# Patient Record
Sex: FEMALE | Race: WHITE | NOT HISPANIC OR LATINO | Employment: OTHER | URBAN - METROPOLITAN AREA
[De-identification: names, ages, dates, MRNs, and addresses within clinical notes are randomized per-mention and may not be internally consistent; named-entity substitution may affect disease eponyms.]

---

## 2018-03-30 ENCOUNTER — HOSPITAL ENCOUNTER (OUTPATIENT)
Facility: CLINIC | Age: 68
Discharge: HOME | End: 2018-03-30
Attending: FAMILY MEDICINE
Payer: MEDICARE

## 2018-03-30 VITALS
HEART RATE: 67 BPM | OXYGEN SATURATION: 98 % | DIASTOLIC BLOOD PRESSURE: 66 MMHG | RESPIRATION RATE: 16 BRPM | TEMPERATURE: 98.6 F | SYSTOLIC BLOOD PRESSURE: 110 MMHG

## 2018-03-30 DIAGNOSIS — S61.214A LACERATION OF RIGHT RING FINGER WITHOUT FOREIGN BODY WITHOUT DAMAGE TO NAIL, INITIAL ENCOUNTER: Primary | ICD-10-CM

## 2018-03-30 LAB — GLUCOSE BLOOD, POC: 153

## 2018-03-30 PROCEDURE — 99214 OFFICE O/P EST MOD 30 MIN: CPT | Mod: 25 | Performed by: FAMILY MEDICINE

## 2018-03-30 PROCEDURE — 82962 GLUCOSE BLOOD TEST: CPT | Performed by: FAMILY MEDICINE

## 2018-03-30 PROCEDURE — 93000 ELECTROCARDIOGRAM COMPLETE: CPT | Mod: 59 | Performed by: FAMILY MEDICINE

## 2018-03-30 PROCEDURE — 12001 RPR S/N/AX/GEN/TRNK 2.5CM/<: CPT | Performed by: FAMILY MEDICINE

## 2018-03-30 RX ORDER — FOSINOPRIL SODIUM 40 MG/1
40 TABLET ORAL
COMMUNITY
Start: 2017-05-01 | End: 2018-04-17 | Stop reason: SDUPTHER

## 2018-03-30 RX ORDER — CHOLECALCIFEROL (VITAMIN D3) 25 MCG
TABLET ORAL
COMMUNITY
Start: 2015-04-27

## 2018-03-30 RX ORDER — NAPROXEN SODIUM 220 MG/1
81 TABLET, FILM COATED ORAL
COMMUNITY
Start: 2012-03-01 | End: 2021-10-26

## 2018-03-30 RX ORDER — NADOLOL 40 MG/1
40 TABLET ORAL
COMMUNITY
Start: 2017-05-01 | End: 2018-04-21 | Stop reason: SDUPTHER

## 2018-03-30 RX ORDER — ALPRAZOLAM 0.25 MG/1
0.25 TABLET ORAL
COMMUNITY
Start: 2017-05-01 | End: 2018-07-02 | Stop reason: SDUPTHER

## 2018-03-30 RX ORDER — HYDROCHLOROTHIAZIDE 25 MG/1
25 TABLET ORAL
COMMUNITY
Start: 2017-07-26 | End: 2018-06-27 | Stop reason: SDUPTHER

## 2018-03-30 RX ORDER — SIMVASTATIN 40 MG/1
40 TABLET, FILM COATED ORAL
COMMUNITY
Start: 2017-05-01 | End: 2018-04-17 | Stop reason: SDUPTHER

## 2018-03-30 RX ORDER — METFORMIN HYDROCHLORIDE 1000 MG/1
TABLET ORAL
COMMUNITY
Start: 2017-05-01 | End: 2018-06-27 | Stop reason: SDUPTHER

## 2018-03-30 RX ORDER — CETIRIZINE HYDROCHLORIDE 10 MG/1
10 TABLET ORAL
COMMUNITY
Start: 2012-11-08 | End: 2020-06-19 | Stop reason: ALTCHOICE

## 2018-03-30 RX ORDER — OMEGA-3 FATTY ACIDS 1000 MG
CAPSULE ORAL
COMMUNITY
Start: 2014-03-21 | End: 2022-05-10 | Stop reason: ALTCHOICE

## 2018-03-30 NOTE — ED PROVIDER NOTES
History  No chief complaint on file.    68 yo f, presents c/o right 4th finger laceration.  Pt states she broke a glass.  Pt said she then tried to crush glass, and then push it down in the garbage.  The edge cut her finger.  Pt denies pain/  Pt has full rom.  No Parathesias.  No other injuries.   NKDa.  No cp, sob.      During the procedure, pt started c/o nausea, then passed out.  Pt was moved back to lay on table.  Pt woke up immediatly.  Had continued complaints of nausea. Pt denies cp, sob, palp, weakness, h/a, vision changes.  After evaluating patient, she was discharged.  Pt denies cp, sob, palp, dizziness, vision changes, paraesthesias. Pt denies any other complaints on 14 pt ros.             Past Medical History:   Diagnosis Date   • Hypertension        No past surgical history on file.    No family history on file.    Social History   Substance Use Topics   • Smoking status: Never Smoker   • Smokeless tobacco: Never Used   • Alcohol use Not on file       Review of Systems    Physical Exam  ED Triage Vitals [03/30/18 1509]   Temp Heart Rate Resp BP SpO2   37 °C (98.6 °F) 67 16 110/66 98 %      Temp src Heart Rate Source Patient Position BP Location FiO2 (%) (Set)   -- -- -- -- --       Physical Exam   Constitutional: She is oriented to person, place, and time. She appears well-developed and well-nourished. No distress.   HENT:   Head: Normocephalic.   Nose: Nose normal.   Mouth/Throat: Oropharynx is clear and moist.   Eyes: Conjunctivae and EOM are normal. Pupils are equal, round, and reactive to light.   Neck: Normal range of motion. Neck supple. No JVD present. No tracheal deviation present. No thyromegaly present.   Cardiovascular: Normal rate, regular rhythm, normal heart sounds and intact distal pulses.  Exam reveals no gallop and no friction rub.    No murmur heard.  Pulmonary/Chest: Effort normal and breath sounds normal.   Abdominal: Soft. Bowel sounds are normal.   Musculoskeletal: She exhibits no  edema, tenderness or deformity.   Right hand, 4th finger volar, small 1cm x 2cm x 3cm   Lymphadenopathy:     She has no cervical adenopathy.   Neurological: She is alert and oriented to person, place, and time. No cranial nerve deficit or sensory deficit. Coordination normal.   Patient was [id number] during exam and beginning of procedure.  Pt watched suturing, then passed out.  Pt woke quickly, and was aaox3, immediatly after.  Prior to d/c, aaox3, nad, nonfocal, no signs of abnormal balance.   Skin: Skin is warm. Capillary refill takes less than 2 seconds. She is not diaphoretic.   Psychiatric: She has a normal mood and affect. Her behavior is normal. Thought content normal.         Procedures  Laceration Repair  Date/Time: 3/30/2018 3:51 PM  Performed by: ANUSHA ADAMS  Authorized by: ANUSHA ADAMS     Consent:     Consent obtained:  Verbal    Consent given by:  Patient    Risks discussed:  Infection, need for additional repair, nerve damage, poor wound healing, poor cosmetic result, pain, retained foreign body, tendon damage and vascular damage    Alternatives discussed:  No treatment, observation and delayed treatment  Anesthesia (see MAR for exact dosages):     Anesthesia method:  Local infiltration    Local anesthetic:  Lidocaine 2% w/o epi  Laceration details:     Location:  Finger    Finger location:  R ring finger    Length (cm):  1    Depth (mm):  0.3  Repair type:     Repair type:  Simple  Pre-procedure details:     Preparation:  Patient was prepped and draped in usual sterile fashion  Exploration:     Hemostasis achieved with:  Direct pressure    Wound exploration: wound explored through full range of motion and entire depth of wound probed and visualized      Wound extent: no areolar tissue violation noted, no fascia violation noted, no foreign bodies/material noted, no muscle damage noted, no nerve damage noted, no tendon damage noted, no underlying fracture noted and no vascular damage noted       Contaminated: no    Treatment:     Area cleansed with:  Saline    Amount of cleaning:  Standard    Irrigation solution:  Tap water and sterile saline    Irrigation volume:  500    Irrigation method:  Tap and syringe    Visualized foreign bodies/material removed: no    Skin repair:     Repair method:  Sutures    Suture size:  5-0    Suture material:  Nylon    Suture technique:  Simple interrupted  Approximation:     Approximation:  Close  Post-procedure details:     Dressing:  Antibiotic ointment and sterile dressing    Patient tolerance of procedure:  Tolerated with difficulty  Comments:      Pt had a syncopal episode during procedure. She was sitting up watching, then, c/o nausea, then lost consc.  Pt fell forward onto me.  I called for assistance, layed patient back onto table, elevated feet. Head flat. Pt awoke immediatly.  She was alert to consent for continued repair.  3 sutures placed.  Wound re-irrigated after procedure.  Pt alert and awake after procedure.  She will be evaluate dfo revent        UC Course  ED Course          Clinical Impressions as of Mar 30 1600   (Final) Laceration of right ring finger without foreign body without damage to nail, initial encounter   (Under Consideration) Syncope and collapse       FARHAD Munroe,   03/30/18 7915

## 2018-03-30 NOTE — DISCHARGE INSTRUCTIONS
Laceration:  Please return in 14 days for suture removal.    Please keep hand dry for 24 hours.  After 24 hours you may wash your hands, pat dry and re-bandage.  Leave the Steri strips in place until they fall off.    Please do not submerge hand, bathe, or swim.  You may shower.  Please monitor area for more pain, swelling, redness pus, fever.  If any occur, please follow up asap.     Syncope:  You passed out during the procedure.  I was able to finish the procedure once you were safe.  I did an EKG which showed no acute changes.  Your blood sugar was in the 150s.  If symptoms return, please go to the ER.  If severe, please call 911.

## 2018-04-03 ENCOUNTER — OFFICE VISIT (OUTPATIENT)
Dept: FAMILY MEDICINE | Facility: CLINIC | Age: 68
End: 2018-04-03
Payer: MEDICARE

## 2018-04-03 VITALS
WEIGHT: 152.4 LBS | TEMPERATURE: 98.6 F | SYSTOLIC BLOOD PRESSURE: 142 MMHG | DIASTOLIC BLOOD PRESSURE: 82 MMHG | HEART RATE: 64 BPM | BODY MASS INDEX: 26.02 KG/M2 | RESPIRATION RATE: 16 BRPM | HEIGHT: 64 IN

## 2018-04-03 DIAGNOSIS — J32.9 SINUSITIS, UNSPECIFIED CHRONICITY, UNSPECIFIED LOCATION: Primary | ICD-10-CM

## 2018-04-03 PROCEDURE — 99213 OFFICE O/P EST LOW 20 MIN: CPT | Performed by: FAMILY MEDICINE

## 2018-04-03 RX ORDER — AMOXICILLIN AND CLAVULANATE POTASSIUM 875; 125 MG/1; MG/1
1 TABLET, FILM COATED ORAL 2 TIMES DAILY
Qty: 20 TABLET | Refills: 0 | Status: SHIPPED | OUTPATIENT
Start: 2018-04-03 | End: 2020-01-31 | Stop reason: ALTCHOICE

## 2018-04-03 RX ORDER — BENZONATATE 100 MG/1
100 CAPSULE ORAL 3 TIMES DAILY PRN
Qty: 21 CAPSULE | Refills: 0 | Status: SHIPPED | OUTPATIENT
Start: 2018-04-03 | End: 2020-01-31 | Stop reason: ALTCHOICE

## 2018-04-03 ASSESSMENT — ENCOUNTER SYMPTOMS
COUGH: 1
SINUS PAIN: 1

## 2018-04-03 NOTE — PROGRESS NOTES
"Subjective      Patient ID: Vianney Bone is a 67 y.o. female.    URI    This is a new problem. The current episode started in the past 7 days. The problem has been unchanged. The maximum temperature recorded prior to her arrival was 100.4 - 100.9 F. Associated symptoms include congestion, coughing, headaches and sinus pain. Pertinent negatives include no wheezing. Treatments tried: mucinex. The treatment provided no relief.       The following have been reviewed and updated as appropriate in this visit:       Review of Systems   Constitutional: Positive for fever.   HENT: Positive for congestion, sinus pain and sinus pressure.    Respiratory: Positive for cough. Negative for wheezing.    Neurological: Positive for headaches. Negative for dizziness and light-headedness.       Objective     Vitals:    04/03/18 1513   BP: (!) 142/82   Pulse: 64   Resp: 16   Temp: 37 °C (98.6 °F)   Weight: 69.1 kg (152 lb 6.4 oz)   Height: 1.626 m (5' 4\")     Body mass index is 26.16 kg/m².    Physical Exam   Constitutional: She appears well-developed and well-nourished.   HENT:   Right Ear: Tympanic membrane and ear canal normal.   Left Ear: Tympanic membrane and ear canal normal.   Nose: Right sinus exhibits maxillary sinus tenderness. Left sinus exhibits maxillary sinus tenderness.   Mouth/Throat: Uvula is midline, oropharynx is clear and moist and mucous membranes are normal.   Cardiovascular: Normal rate and normal heart sounds.    Pulmonary/Chest: Effort normal and breath sounds normal. No respiratory distress. She has no wheezes.       Assessment/Plan   Problem List Items Addressed This Visit     Sinusitis - Primary     Prescribed Augmentin 875 mg twice a day for 10 days with food  Prescribed Tessalon Perles 100 mg 3 times a day as needed for cough  Call if symptoms persist or worsen               "

## 2018-04-04 PROBLEM — J32.9 SINUSITIS: Status: ACTIVE | Noted: 2018-04-04

## 2018-04-04 ASSESSMENT — ENCOUNTER SYMPTOMS
HEADACHES: 1
LIGHT-HEADEDNESS: 0
DIZZINESS: 0
FEVER: 1
SINUS PRESSURE: 1
WHEEZING: 0

## 2018-04-04 NOTE — ASSESSMENT & PLAN NOTE
Prescribed Augmentin 875 mg twice a day for 10 days with food  Prescribed Tessalon Perles 100 mg 3 times a day as needed for cough  Call if symptoms persist or worsen

## 2018-04-13 ENCOUNTER — HOSPITAL ENCOUNTER (OUTPATIENT)
Facility: CLINIC | Age: 68
Discharge: HOME | End: 2018-04-13
Attending: EMERGENCY MEDICINE
Payer: MEDICARE

## 2018-04-13 VITALS
SYSTOLIC BLOOD PRESSURE: 130 MMHG | WEIGHT: 150 LBS | HEIGHT: 64 IN | BODY MASS INDEX: 25.61 KG/M2 | RESPIRATION RATE: 16 BRPM | TEMPERATURE: 97.7 F | DIASTOLIC BLOOD PRESSURE: 88 MMHG | HEART RATE: 55 BPM | OXYGEN SATURATION: 96 %

## 2018-04-13 DIAGNOSIS — Z48.02 VISIT FOR SUTURE REMOVAL: Primary | ICD-10-CM

## 2018-04-13 PROCEDURE — 99213 OFFICE O/P EST LOW 20 MIN: CPT | Performed by: EMERGENCY MEDICINE

## 2018-04-13 ASSESSMENT — ENCOUNTER SYMPTOMS
COUGH: 0
SORE THROAT: 0
HEMATURIA: 0
DYSURIA: 0
CHILLS: 0
VOMITING: 0
ABDOMINAL PAIN: 0
SEIZURES: 0
WOUND: 1
PALPITATIONS: 0
FEVER: 0
COLOR CHANGE: 0
EYE PAIN: 0
ARTHRALGIAS: 0
SHORTNESS OF BREATH: 0
BACK PAIN: 0

## 2018-04-13 NOTE — ED PROVIDER NOTES
History  Chief Complaint   Patient presents with   • Suture / Staple Removal     right 3rd finger      70 yo female with a history of HTN returns to the urgent care for suture removal. The patient had 3 sutures placed in her right ring finger on 3/30/18. The wound is healing well. No drainage or bleeding. No surrounding redness/warmth. Tetanus UTD. No other complaints.            Past Medical History:   Diagnosis Date   • Hypertension        History reviewed. No pertinent surgical history.    History reviewed. No pertinent family history.    Social History   Substance Use Topics   • Smoking status: Never Smoker   • Smokeless tobacco: Never Used   • Alcohol use Not on file       Review of Systems   Constitutional: Negative for chills and fever.   HENT: Negative for ear pain and sore throat.    Eyes: Negative for pain and visual disturbance.   Respiratory: Negative for cough and shortness of breath.    Cardiovascular: Negative for chest pain and palpitations.   Gastrointestinal: Negative for abdominal pain and vomiting.   Genitourinary: Negative for dysuria and hematuria.   Musculoskeletal: Negative for arthralgias and back pain.   Skin: Positive for wound. Negative for color change and rash.   Neurological: Negative for seizures and syncope.   All other systems reviewed and are negative.      Physical Exam  ED Triage Vitals [04/13/18 1127]   Temp Heart Rate Resp BP SpO2   36.5 °C (97.7 °F) (!) 55 16 130/88 96 %      Temp Source Heart Rate Source Patient Position BP Location FiO2 (%) (Set)   Oral Monitor Sitting Left upper arm --       Physical Exam   Constitutional: She appears well-developed and well-nourished. No distress.   HENT:   Head: Normocephalic and atraumatic.   Eyes: Conjunctivae are normal.   Neck: Neck supple.   Cardiovascular: Normal rate and regular rhythm.    No murmur heard.  Pulmonary/Chest: Effort normal and breath sounds normal. No respiratory distress.   Abdominal: Soft. There is no tenderness.    Musculoskeletal: She exhibits no edema.        Right hand: She exhibits laceration. She exhibits normal range of motion, no tenderness, normal capillary refill and no swelling. Normal sensation noted. Normal strength noted.   Right volar ring finger laceration C/D/I. No surrounding erythema/warmth. Sutures x 3 in place.   Neurological: She is alert.   Skin: Skin is warm and dry.   Psychiatric: She has a normal mood and affect.   Nursing note and vitals reviewed.        Procedures  Suture Removal  Date/Time: 4/13/2018 11:47 AM  Performed by: JUSTIN MCKEON  Authorized by: JUSTIN MCKEON     Consent:     Consent obtained:  Verbal    Consent given by:  Patient    Risks discussed:  Bleeding, pain and wound separation  Location:     Location:  Upper extremity    Upper extremity location:  Hand    Hand location:  R ring finger  Procedure details:     Wound appearance:  No signs of infection    Number of sutures removed:  3  Post-procedure details:     Post-removal:  No dressing applied    Patient tolerance of procedure:  Tolerated well, no immediate complications        UC Course  ED Course          Clinical Impressions as of Apr 13 1139   Visit for suture removal       MDM  Number of Diagnoses or Management Options  Visit for suture removal:   Diagnosis management comments: Sutures removed without difficulty. The wound is healing well, no signs of infection. Plan for local wound care and PCP follow up as needed. The patient is agreeable to this plan. Strict return precautions provided.    Patient Progress  Patient progress: stable                 Justin Mckeon MD  04/13/18 9003

## 2018-04-17 RX ORDER — FOSINOPRIL SODIUM 40 MG/1
40 TABLET ORAL DAILY
Qty: 90 TABLET | Refills: 1 | Status: SHIPPED | OUTPATIENT
Start: 2018-04-17 | End: 2018-06-27 | Stop reason: SDUPTHER

## 2018-04-17 RX ORDER — SIMVASTATIN 40 MG/1
40 TABLET, FILM COATED ORAL NIGHTLY
Qty: 90 TABLET | Refills: 1 | Status: SHIPPED | OUTPATIENT
Start: 2018-04-17 | End: 2018-06-27 | Stop reason: SDUPTHER

## 2018-04-23 RX ORDER — NADOLOL 40 MG/1
40 TABLET ORAL DAILY
Qty: 90 TABLET | Refills: 3 | Status: SHIPPED | OUTPATIENT
Start: 2018-04-23 | End: 2018-06-27 | Stop reason: SDUPTHER

## 2018-05-15 ENCOUNTER — TRANSCRIBE ORDERS (OUTPATIENT)
Dept: SCHEDULING | Age: 68
End: 2018-05-15

## 2018-05-15 ENCOUNTER — APPOINTMENT (OUTPATIENT)
Dept: URBAN - METROPOLITAN AREA CLINIC 200 | Age: 68
Setting detail: DERMATOLOGY
End: 2018-05-18

## 2018-05-15 DIAGNOSIS — D485 NEOPLASM OF UNCERTAIN BEHAVIOR OF SKIN: ICD-10-CM

## 2018-05-15 DIAGNOSIS — L20.84 INTRINSIC (ALLERGIC) ECZEMA: ICD-10-CM

## 2018-05-15 DIAGNOSIS — Z12.39 SCREENING BREAST EXAMINATION: Primary | ICD-10-CM

## 2018-05-15 DIAGNOSIS — L57.8 OTHER SKIN CHANGES DUE TO CHRONIC EXPOSURE TO NONIONIZING RADIATION: ICD-10-CM

## 2018-05-15 PROBLEM — D48.5 NEOPLASM OF UNCERTAIN BEHAVIOR OF SKIN: Status: ACTIVE | Noted: 2018-05-15

## 2018-05-15 PROCEDURE — OTHER PRESCRIPTION: OTHER

## 2018-05-15 PROCEDURE — 99213 OFFICE O/P EST LOW 20 MIN: CPT | Mod: 25

## 2018-05-15 PROCEDURE — OTHER COUNSELING: OTHER

## 2018-05-15 PROCEDURE — 11100: CPT

## 2018-05-15 PROCEDURE — OTHER BIOPSY BY SHAVE METHOD: OTHER

## 2018-05-15 RX ORDER — ALCLOMETASONE DIPROPIONATE 0.5 MG/G
CREAM TOPICAL
Qty: 1 | Refills: 5 | Status: ERX

## 2018-05-15 ASSESSMENT — LOCATION DETAILED DESCRIPTION DERM
LOCATION DETAILED: LEFT MEDIAL FOREHEAD
LOCATION DETAILED: NASAL ROOT
LOCATION DETAILED: LEFT MEDIAL SUPERIOR CHEST

## 2018-05-15 ASSESSMENT — LOCATION SIMPLE DESCRIPTION DERM
LOCATION SIMPLE: NOSE
LOCATION SIMPLE: LEFT FOREHEAD
LOCATION SIMPLE: CHEST

## 2018-05-15 ASSESSMENT — LOCATION ZONE DERM
LOCATION ZONE: NOSE
LOCATION ZONE: TRUNK
LOCATION ZONE: FACE

## 2018-05-15 ASSESSMENT — SEVERITY ASSESSMENT: SEVERITY: MILD

## 2018-05-15 NOTE — PROCEDURE: BIOPSY BY SHAVE METHOD
Was A Bandage Applied: Yes
Anesthesia Type: 1% lidocaine with epinephrine
Type Of Destruction Used: Curettage
X Size Of Lesion In Cm: 0
Destruction After The Procedure: No
Biopsy Type: H and E
Billing Type: Third-Party Bill
Dressing: bandage
Wound Care: Vaseline
Post-Care Instructions: I reviewed with the patient in detail post-care instructions. Patient is to keep the biopsy site dry overnight, and then apply bacitracin twice daily until healed. Patient may apply hydrogen peroxide soaks to remove any crusting.
Notification Instructions: Patient will be notified of biopsy results. However, patient instructed to call the office if not contacted within 2 weeks.
consent was obtained and risks were reviewed including but not limited to scarring, infection, bleeding, scabbing, incomplete removal, nerve damage and allergy to anesthesia.
Biopsy Method: Personna blade
Size Of Lesion In Cm: 0.2
Anesthesia Volume In Cc (Will Not Render If 0): 0.1
Hemostasis: Drysol
Detail Level: Detailed

## 2018-06-14 ENCOUNTER — HOSPITAL ENCOUNTER (OUTPATIENT)
Dept: RADIOLOGY | Facility: HOSPITAL | Age: 68
Discharge: HOME | End: 2018-06-14
Attending: OBSTETRICS & GYNECOLOGY
Payer: MEDICARE

## 2018-06-14 DIAGNOSIS — Z12.39 SCREENING BREAST EXAMINATION: ICD-10-CM

## 2018-06-14 PROCEDURE — 77063 BREAST TOMOSYNTHESIS BI: CPT

## 2018-06-21 ENCOUNTER — CLINICAL SUPPORT (OUTPATIENT)
Dept: FAMILY MEDICINE | Facility: CLINIC | Age: 68
End: 2018-06-21
Payer: MEDICARE

## 2018-06-21 DIAGNOSIS — E11.9 CONTROLLED TYPE 2 DIABETES MELLITUS WITHOUT COMPLICATION, WITHOUT LONG-TERM CURRENT USE OF INSULIN (CMS/HCC): Primary | ICD-10-CM

## 2018-06-21 DIAGNOSIS — E78.5 HYPERLIPIDEMIA, UNSPECIFIED HYPERLIPIDEMIA TYPE: ICD-10-CM

## 2018-06-21 PROCEDURE — 36415 COLL VENOUS BLD VENIPUNCTURE: CPT | Performed by: FAMILY MEDICINE

## 2018-06-22 LAB
ALBUMIN SERPL-MCNC: 4.4 G/DL (ref 3.6–4.8)
ALBUMIN/CREAT UR: 14.3 MG/G CREAT (ref 0–30)
ALBUMIN/GLOB SERPL: 1.6 {RATIO} (ref 1.2–2.2)
ALP SERPL-CCNC: 37 IU/L (ref 39–117)
ALT SERPL-CCNC: 24 IU/L (ref 0–32)
AST SERPL-CCNC: 23 IU/L (ref 0–40)
BASOPHILS # BLD AUTO: 0 X10E3/UL (ref 0–0.2)
BASOPHILS NFR BLD AUTO: 0 %
BILIRUB SERPL-MCNC: 0.7 MG/DL (ref 0–1.2)
BUN SERPL-MCNC: 16 MG/DL (ref 8–27)
BUN/CREAT SERPL: 12 (ref 12–28)
CALCIUM SERPL-MCNC: 9.9 MG/DL (ref 8.7–10.3)
CHLORIDE SERPL-SCNC: 96 MMOL/L (ref 96–106)
CHOLEST SERPL-MCNC: 157 MG/DL (ref 100–199)
CO2 SERPL-SCNC: 27 MMOL/L (ref 20–29)
CREAT SERPL-MCNC: 1.29 MG/DL (ref 0.57–1)
CREAT UR-MCNC: 61.6 MG/DL
EOSINOPHIL # BLD AUTO: 0.6 X10E3/UL (ref 0–0.4)
EOSINOPHIL NFR BLD AUTO: 8 %
ERYTHROCYTE [DISTWIDTH] IN BLOOD BY AUTOMATED COUNT: 13.9 % (ref 12.3–15.4)
GFR SERPLBLD CREATININE-BSD FMLA CKD-EPI: 43 ML/MIN/1.73
GFR SERPLBLD CREATININE-BSD FMLA CKD-EPI: 50 ML/MIN/1.73
GLOBULIN SER CALC-MCNC: 2.7 G/DL (ref 1.5–4.5)
GLUCOSE SERPL-MCNC: 135 MG/DL (ref 65–99)
HBA1C MFR BLD: 6.8 % (ref 4.8–5.6)
HCT VFR BLD AUTO: 37.1 % (ref 34–46.6)
HDLC SERPL-MCNC: 44 MG/DL
HGB BLD-MCNC: 12.7 G/DL (ref 11.1–15.9)
IMM GRANULOCYTES # BLD: 0 X10E3/UL (ref 0–0.1)
IMM GRANULOCYTES NFR BLD: 0 %
LDLC SERPL CALC-MCNC: 67 MG/DL (ref 0–99)
LYMPHOCYTES # BLD AUTO: 1.5 X10E3/UL (ref 0.7–3.1)
LYMPHOCYTES NFR BLD AUTO: 21 %
MCH RBC QN AUTO: 30.2 PG (ref 26.6–33)
MCHC RBC AUTO-ENTMCNC: 34.2 G/DL (ref 31.5–35.7)
MCV RBC AUTO: 88 FL (ref 79–97)
MICROALBUMIN UR-MCNC: 8.8 UG/ML
MONOCYTES # BLD AUTO: 0.5 X10E3/UL (ref 0.1–0.9)
MONOCYTES NFR BLD AUTO: 7 %
NEUTROPHILS # BLD AUTO: 4.7 X10E3/UL (ref 1.4–7)
NEUTROPHILS NFR BLD AUTO: 64 %
PLATELET # BLD AUTO: 301 X10E3/UL (ref 150–379)
POTASSIUM SERPL-SCNC: 4.4 MMOL/L (ref 3.5–5.2)
PROT SERPL-MCNC: 7.1 G/DL (ref 6–8.5)
RBC # BLD AUTO: 4.2 X10E6/UL (ref 3.77–5.28)
SODIUM SERPL-SCNC: 140 MMOL/L (ref 134–144)
TRIGL SERPL-MCNC: 228 MG/DL (ref 0–149)
VLDLC SERPL CALC-MCNC: 46 MG/DL (ref 5–40)
WBC # BLD AUTO: 7.3 X10E3/UL (ref 3.4–10.8)

## 2018-06-27 ENCOUNTER — OFFICE VISIT (OUTPATIENT)
Dept: FAMILY MEDICINE | Facility: CLINIC | Age: 68
End: 2018-06-27
Payer: MEDICARE

## 2018-06-27 VITALS
HEART RATE: 68 BPM | BODY MASS INDEX: 26.36 KG/M2 | WEIGHT: 154.4 LBS | SYSTOLIC BLOOD PRESSURE: 140 MMHG | RESPIRATION RATE: 16 BRPM | HEIGHT: 64 IN | DIASTOLIC BLOOD PRESSURE: 86 MMHG | TEMPERATURE: 97.5 F

## 2018-06-27 DIAGNOSIS — Z23 IMMUNIZATION DUE: ICD-10-CM

## 2018-06-27 DIAGNOSIS — Z00.00 MEDICARE ANNUAL WELLNESS VISIT, SUBSEQUENT: Primary | ICD-10-CM

## 2018-06-27 DIAGNOSIS — E78.2 MIXED HYPERLIPIDEMIA: ICD-10-CM

## 2018-06-27 DIAGNOSIS — E11.9 TYPE 2 DIABETES MELLITUS WITHOUT COMPLICATION, WITHOUT LONG-TERM CURRENT USE OF INSULIN (CMS/HCC): ICD-10-CM

## 2018-06-27 DIAGNOSIS — I10 BENIGN ESSENTIAL HYPERTENSION: ICD-10-CM

## 2018-06-27 PROBLEM — C44.321 SQUAMOUS CELL CANCER OF SKIN OF NOSE: Status: ACTIVE | Noted: 2018-06-27

## 2018-06-27 PROCEDURE — 90732 PPSV23 VACC 2 YRS+ SUBQ/IM: CPT | Performed by: FAMILY MEDICINE

## 2018-06-27 PROCEDURE — G0009 ADMIN PNEUMOCOCCAL VACCINE: HCPCS | Performed by: FAMILY MEDICINE

## 2018-06-27 PROCEDURE — G0439 PPPS, SUBSEQ VISIT: HCPCS | Performed by: FAMILY MEDICINE

## 2018-06-27 RX ORDER — SIMVASTATIN 40 MG/1
40 TABLET, FILM COATED ORAL NIGHTLY
Qty: 90 TABLET | Refills: 3 | Status: SHIPPED | OUTPATIENT
Start: 2018-06-27 | End: 2019-06-19 | Stop reason: SDUPTHER

## 2018-06-27 RX ORDER — HYDROCHLOROTHIAZIDE 25 MG/1
25 TABLET ORAL DAILY
Qty: 90 TABLET | Refills: 3 | Status: SHIPPED | OUTPATIENT
Start: 2018-06-27 | End: 2019-08-18 | Stop reason: SDUPTHER

## 2018-06-27 RX ORDER — METFORMIN HYDROCHLORIDE 1000 MG/1
1000 TABLET ORAL 2 TIMES DAILY WITH MEALS
Qty: 180 TABLET | Refills: 3 | Status: SHIPPED | OUTPATIENT
Start: 2018-06-27 | End: 2019-05-07 | Stop reason: SDUPTHER

## 2018-06-27 RX ORDER — NADOLOL 40 MG/1
40 TABLET ORAL DAILY
Qty: 90 TABLET | Refills: 3 | Status: SHIPPED | OUTPATIENT
Start: 2018-06-27 | End: 2019-07-13 | Stop reason: SDUPTHER

## 2018-06-27 RX ORDER — FOSINOPRIL SODIUM 40 MG/1
40 TABLET ORAL DAILY
Qty: 90 TABLET | Refills: 3 | Status: SHIPPED | OUTPATIENT
Start: 2018-06-27 | End: 2019-06-19 | Stop reason: SDUPTHER

## 2018-06-27 ASSESSMENT — ENCOUNTER SYMPTOMS
BRUISES/BLEEDS EASILY: 0
HEADACHES: 0
FREQUENCY: 0
BACK PAIN: 0
COUGH: 0
DIFFICULTY URINATING: 0
VOMITING: 0
NAUSEA: 0
NECK PAIN: 0
CHILLS: 0
SHORTNESS OF BREATH: 0
SLEEP DISTURBANCE: 1
CONSTIPATION: 0
DIZZINESS: 0
POLYPHAGIA: 0
NERVOUS/ANXIOUS: 0
PALPITATIONS: 0
POLYDIPSIA: 0
SINUS PAIN: 0
DIARRHEA: 0
ABDOMINAL PAIN: 0
FEVER: 0
ADENOPATHY: 0
SINUS PRESSURE: 0

## 2018-06-27 NOTE — PROGRESS NOTES
"Subjective      Patient ID: Vianney Bone is a 67 y.o. female.    Patient presents for annual wellness visit.  Does get occasional anxiety, increased stress lately. No chest pain or SOB. Staying active.        The following have been reviewed and updated as appropriate in this visit:  Tobacco  Allergies  Meds  Problems  Med Hx  Surg Hx  Fam Hx  Soc Hx        Review of Systems   Constitutional: Negative for chills and fever.   HENT: Negative for dental problem, sinus pain and sinus pressure.    Eyes: Negative for visual disturbance.   Respiratory: Negative for cough and shortness of breath.    Cardiovascular: Negative for chest pain, palpitations and leg swelling.   Gastrointestinal: Negative for abdominal pain, constipation, diarrhea, nausea and vomiting.   Endocrine: Negative for cold intolerance, heat intolerance, polydipsia, polyphagia and polyuria.   Genitourinary: Negative for difficulty urinating, frequency and urgency.   Musculoskeletal: Negative for back pain and neck pain.   Allergic/Immunologic: Negative for environmental allergies and food allergies.   Neurological: Negative for dizziness and headaches.   Hematological: Negative for adenopathy. Does not bruise/bleed easily.   Psychiatric/Behavioral: Positive for sleep disturbance. Negative for behavioral problems. The patient is not nervous/anxious.        Objective     Vitals:    06/27/18 1550 06/27/18 1600   BP: (!) 158/90 140/86   BP Location:  Left upper arm   Patient Position:  Sitting   Pulse: 68    Resp: 16    Temp: 36.4 °C (97.5 °F)    Weight: 70 kg (154 lb 6.4 oz)    Height: 1.626 m (5' 4\")      Body mass index is 26.5 kg/m².    Physical Exam   Constitutional: She is oriented to person, place, and time. She appears well-developed and well-nourished.   HENT:   Head: Normocephalic and atraumatic.   Nose: Nose normal.   Mouth/Throat: Oropharynx is clear and moist.   Eyes: Conjunctivae and EOM are normal. Pupils are equal, round, and reactive " to light.   Neck: Neck supple. No thyromegaly present.   Cardiovascular: Normal rate, regular rhythm and normal heart sounds.    No murmur heard.  Pulmonary/Chest: Effort normal and breath sounds normal. She has no wheezes.   Abdominal: Soft. Bowel sounds are normal. There is no tenderness.   Musculoskeletal: Normal range of motion.   Lymphadenopathy:     She has no cervical adenopathy.   Neurological: She is alert and oriented to person, place, and time.   3/3 word recall  2/2 clock test   Skin: Skin is warm and dry. No rash noted.   Psychiatric: She has a normal mood and affect. Her behavior is normal.       Assessment/Plan   Problem List Items Addressed This Visit     Benign essential hypertension     Fair control  Continue current meds  Low sat diet  Follow up in 6 weeks         Relevant Medications    fosinopril (MONOPRIL) 40 mg tablet    hydrochlorothiazide (HYDRODIURIL) 25 mg tablet    nadolol (CORGARD) 40 mg tablet    Diabetes mellitus (CMS/HCC) (HCC)     Stable  HgbA1c 6.8  Continue current medication         Relevant Medications    metFORMIN (GLUCOPHAGE) 1,000 mg tablet    Mixed hyperlipidemia     Cholesterol and LDL at goal  Trigs too high and HDL too low  Watch carbs and sugars in diet  Continue simvastatin         Relevant Medications    simvastatin (ZOCOR) 40 mg tablet    Medicare annual wellness visit, subsequent - Primary     68 yo female presents for annual wellness visit  Forms completed, reviewed and scanned         Immunization due     pneumovax         Relevant Orders    Pneumococcal polysaccharide vaccine 23-valent greater than or equal to 1yo subcutaneous/IM (Completed)

## 2018-07-02 RX ORDER — ALPRAZOLAM 0.25 MG/1
0.25 TABLET ORAL NIGHTLY PRN
Qty: 30 TABLET | Refills: 0 | Status: SHIPPED | OUTPATIENT
Start: 2018-07-02 | End: 2020-01-31 | Stop reason: SDUPTHER

## 2018-07-03 PROBLEM — Z23 IMMUNIZATION DUE: Status: ACTIVE | Noted: 2018-07-03

## 2018-07-03 PROBLEM — Z00.00 MEDICARE ANNUAL WELLNESS VISIT, SUBSEQUENT: Status: ACTIVE | Noted: 2018-07-03

## 2018-07-03 RX ORDER — ALPRAZOLAM 0.25 MG/1
0.25 TABLET ORAL NIGHTLY PRN
Qty: 20 TABLET | Refills: 0 | Status: SHIPPED | OUTPATIENT
Start: 2018-07-03 | End: 2019-01-15 | Stop reason: SDUPTHER

## 2018-07-03 ASSESSMENT — ACTIVITIES OF DAILY LIVING (ADL)
ADEQUATE_TO_COMPLETE_ADL: YES
PATIENT'S MEMORY ADEQUATE TO SAFELY COMPLETE DAILY ACTIVITIES?: YES

## 2018-07-03 NOTE — ASSESSMENT & PLAN NOTE
Cholesterol and LDL at goal  Trigs too high and HDL too low  Watch carbs and sugars in diet  Continue simvastatin

## 2018-10-25 ENCOUNTER — OFFICE VISIT (OUTPATIENT)
Dept: FAMILY MEDICINE | Facility: CLINIC | Age: 68
End: 2018-10-25
Payer: MEDICARE

## 2018-10-25 VITALS
HEART RATE: 68 BPM | DIASTOLIC BLOOD PRESSURE: 86 MMHG | TEMPERATURE: 97.7 F | HEIGHT: 64 IN | WEIGHT: 153.8 LBS | BODY MASS INDEX: 26.26 KG/M2 | SYSTOLIC BLOOD PRESSURE: 122 MMHG | RESPIRATION RATE: 14 BRPM

## 2018-10-25 DIAGNOSIS — E78.2 MIXED HYPERLIPIDEMIA: ICD-10-CM

## 2018-10-25 DIAGNOSIS — E11.9 TYPE 2 DIABETES MELLITUS WITHOUT COMPLICATION, WITHOUT LONG-TERM CURRENT USE OF INSULIN (CMS/HCC): Primary | ICD-10-CM

## 2018-10-25 DIAGNOSIS — I10 BENIGN ESSENTIAL HYPERTENSION: ICD-10-CM

## 2018-10-25 PROBLEM — J32.9 SINUSITIS: Status: RESOLVED | Noted: 2018-04-04 | Resolved: 2018-10-25

## 2018-10-25 PROCEDURE — 36415 COLL VENOUS BLD VENIPUNCTURE: CPT | Performed by: FAMILY MEDICINE

## 2018-10-25 PROCEDURE — 99213 OFFICE O/P EST LOW 20 MIN: CPT | Mod: 25 | Performed by: FAMILY MEDICINE

## 2018-10-25 ASSESSMENT — ENCOUNTER SYMPTOMS
COUGH: 0
PALPITATIONS: 0
ABDOMINAL PAIN: 0
BRUISES/BLEEDS EASILY: 0
HEADACHES: 0
SHORTNESS OF BREATH: 0
LIGHT-HEADEDNESS: 0
DIZZINESS: 0
ARTHRALGIAS: 0
NUMBNESS: 0

## 2018-10-25 NOTE — PROGRESS NOTES
"Subjective      Patient ID: Vianney Bone is a 67 y.o. female.  1950      Patient presents for follow-up to her chronic medical conditions diabetes, hypertension hyperlipidemia.  She is doing well with no new complaints.  No chest pain or shortness of breath.  No headaches or dizziness.  She is taking all her medications as prescribed.  She did have a recent flu shot at the pharmacy.        The following have been reviewed and updated as appropriate in this visit:  Tobacco  Meds  Problems  Med Hx  Surg Hx  Fam Hx  Soc Hx      Review of Systems   Respiratory: Negative for cough and shortness of breath.    Cardiovascular: Negative for chest pain, palpitations and leg swelling.   Gastrointestinal: Negative for abdominal pain.   Musculoskeletal: Negative for arthralgias.   Neurological: Negative for dizziness, light-headedness, numbness and headaches.   Hematological: Does not bruise/bleed easily.       Objective     Vitals:    10/25/18 1017 10/25/18 1027   BP: 120/88 122/86   BP Location:  Left upper arm   Patient Position:  Sitting   Pulse: 68    Resp: 14    Temp: 36.5 °C (97.7 °F)    Weight: 69.8 kg (153 lb 12.8 oz)    Height: 1.626 m (5' 4\")      Body mass index is 26.4 kg/m².    Physical Exam   Constitutional: She appears well-developed and well-nourished.   Cardiovascular: Normal rate, regular rhythm and normal heart sounds.    No murmur heard.  Pulmonary/Chest: Effort normal and breath sounds normal. No respiratory distress. She has no wheezes.   Abdominal: Soft. Bowel sounds are normal. She exhibits no distension. There is no tenderness.       Assessment/Plan   Problem List Items Addressed This Visit     Benign essential hypertension     Stable  Continue corgard, HCTZ and monopril         Diabetes mellitus (CMS/HCC) (HCC) - Primary     Stable  FBW today  Continue metformin 1000mg 2x day  Follow up 3-6 months         Relevant Orders    Comprehensive metabolic panel    Hemoglobin A1c    Mixed " hyperlipidemia     Stable  Lipid panel today  Continue simvastatin 40mg daily         Relevant Orders    Comprehensive metabolic panel    Lipid panel

## 2018-10-26 ENCOUNTER — TELEPHONE (OUTPATIENT)
Dept: FAMILY MEDICINE | Facility: CLINIC | Age: 68
End: 2018-10-26

## 2018-10-26 LAB
ALBUMIN SERPL-MCNC: 4.1 G/DL (ref 3.6–5.1)
ALBUMIN/GLOB SERPL: 1.6 (CALC) (ref 1–2.5)
ALP SERPL-CCNC: 39 U/L (ref 33–130)
ALT SERPL-CCNC: 18 U/L (ref 6–29)
AST SERPL-CCNC: 17 U/L (ref 10–35)
BILIRUB SERPL-MCNC: 0.8 MG/DL (ref 0.2–1.2)
BUN SERPL-MCNC: 19 MG/DL (ref 7–25)
BUN/CREAT SERPL: 16 (CALC) (ref 6–22)
CALCIUM SERPL-MCNC: 9.5 MG/DL (ref 8.6–10.4)
CHLORIDE SERPL-SCNC: 97 MMOL/L (ref 98–110)
CHOLEST SERPL-MCNC: 169 MG/DL
CHOLEST/HDLC SERPL: 4 (CALC)
CO2 SERPL-SCNC: 30 MMOL/L (ref 20–32)
CREAT SERPL-MCNC: 1.16 MG/DL (ref 0.5–0.99)
GFR SERPL CREATININE-BSD FRML MDRD: 49 ML/MIN/1.73M2
GLOBULIN SER CALC-MCNC: 2.6 G/DL (CALC) (ref 1.9–3.7)
GLUCOSE SERPL-MCNC: 122 MG/DL (ref 65–99)
HBA1C MFR BLD: 6.6 % OF TOTAL HGB
HDLC SERPL-MCNC: 42 MG/DL
LDLC SERPL CALC-MCNC: 83 MG/DL (CALC)
NONHDLC SERPL-MCNC: 127 MG/DL (CALC)
POTASSIUM SERPL-SCNC: 3.9 MMOL/L (ref 3.5–5.3)
PROT SERPL-MCNC: 6.7 G/DL (ref 6.1–8.1)
SODIUM SERPL-SCNC: 136 MMOL/L (ref 135–146)
TRIGL SERPL-MCNC: 365 MG/DL

## 2018-10-26 NOTE — PROGRESS NOTES
Notify patient HgbA1c good at 6.6, Total cholesterol good at 169, LDL 83 but trigs elevated at 365 and HDL low at 42 - watch carbs and sugars in her diet.  Liver functions normal.  Creatinine stable.  Stay hydrated.   FBW next office visit.

## 2018-10-26 NOTE — TELEPHONE ENCOUNTER
Pt aware  Asked what normal trig <150  ----- Message from Mer Mills DO sent at 10/26/2018 11:24 AM EDT -----  Notify patient HgbA1c good at 6.6, Total cholesterol good at 169, LDL 83 but trigs elevated at 365 and HDL low at 42 - watch carbs and sugars in her diet.  Liver functions normal.  Creatinine stable.  Stay hydrated.   FBW next office visit.

## 2019-01-08 ENCOUNTER — APPOINTMENT (OUTPATIENT)
Dept: URBAN - METROPOLITAN AREA CLINIC 200 | Age: 69
Setting detail: DERMATOLOGY
End: 2019-01-09

## 2019-01-08 DIAGNOSIS — Z85.828 PERSONAL HISTORY OF OTHER MALIGNANT NEOPLASM OF SKIN: ICD-10-CM

## 2019-01-08 DIAGNOSIS — L70.0 ACNE VULGARIS: ICD-10-CM

## 2019-01-08 DIAGNOSIS — L57.8 OTHER SKIN CHANGES DUE TO CHRONIC EXPOSURE TO NONIONIZING RADIATION: ICD-10-CM

## 2019-01-08 DIAGNOSIS — D485 NEOPLASM OF UNCERTAIN BEHAVIOR OF SKIN: ICD-10-CM

## 2019-01-08 PROBLEM — D48.5 NEOPLASM OF UNCERTAIN BEHAVIOR OF SKIN: Status: ACTIVE | Noted: 2019-01-08

## 2019-01-08 PROBLEM — I10 ESSENTIAL (PRIMARY) HYPERTENSION: Status: ACTIVE | Noted: 2019-01-08

## 2019-01-08 PROCEDURE — 11102 TANGNTL BX SKIN SINGLE LES: CPT

## 2019-01-08 PROCEDURE — OTHER MIPS QUALITY: OTHER

## 2019-01-08 PROCEDURE — 99213 OFFICE O/P EST LOW 20 MIN: CPT | Mod: 25

## 2019-01-08 PROCEDURE — OTHER REASSURANCE: OTHER

## 2019-01-08 PROCEDURE — OTHER COUNSELING: OTHER

## 2019-01-08 PROCEDURE — OTHER BIOPSY BY SHAVE METHOD: OTHER

## 2019-01-08 ASSESSMENT — LOCATION SIMPLE DESCRIPTION DERM
LOCATION SIMPLE: RIGHT PRETIBIAL REGION
LOCATION SIMPLE: LEFT TEMPLE
LOCATION SIMPLE: LEFT ANTERIOR NECK
LOCATION SIMPLE: CHEST

## 2019-01-08 ASSESSMENT — LOCATION ZONE DERM
LOCATION ZONE: FACE
LOCATION ZONE: LEG
LOCATION ZONE: TRUNK
LOCATION ZONE: NECK

## 2019-01-08 ASSESSMENT — LOCATION DETAILED DESCRIPTION DERM
LOCATION DETAILED: LEFT MEDIAL SUPERIOR CHEST
LOCATION DETAILED: RIGHT LATERAL DISTAL PRETIBIAL REGION
LOCATION DETAILED: LEFT CENTRAL TEMPLE
LOCATION DETAILED: LEFT INFERIOR ANTERIOR NECK

## 2019-01-08 NOTE — PROCEDURE: BIOPSY BY SHAVE METHOD
Biopsy Type: H and E
Post-Care Instructions: I reviewed with the patient in detail post-care instructions. Patient is to keep the biopsy site dry overnight, and then apply bacitracin twice daily until healed. Patient may apply hydrogen peroxide soaks to remove any crusting.
Additional Anesthesia Volume In Cc (Will Not Render If 0): 0
Bill For Surgical Tray: no
Cryotherapy Text: The wound bed was treated with cryotherapy after the biopsy was performed.
Wound Care: Petrolatum
Dressing: bandage
Electrodesiccation Text: The wound bed was treated with electrodesiccation after the biopsy was performed.
Notification Instructions: Patient will be notified of biopsy results. However, patient instructed to call the office if not contacted within 2 weeks.
Hemostasis: Drysol
Depth Of Biopsy: dermis
Detail Level: Detailed
Consent: Written consent was obtained and risks were reviewed including but not limited to scarring, infection, bleeding, scabbing, incomplete removal, nerve damage and allergy to anesthesia.
Silver Nitrate Text: The wound bed was treated with silver nitrate after the biopsy was performed.
Biopsy Method: Dermablade
Type Of Destruction Used: Curettage
Anesthesia Volume In Cc (Will Not Render If 0): 0.5
Curettage Text: The wound bed was treated with curettage after the biopsy was performed.
Billing Type: Third-Party Bill
Was A Bandage Applied: Yes
Electrodesiccation And Curettage Text: The wound bed was treated with electrodesiccation and curettage after the biopsy was performed.

## 2019-01-15 RX ORDER — ALPRAZOLAM 0.25 MG/1
0.25 TABLET ORAL NIGHTLY PRN
Qty: 90 TABLET | Refills: 0 | Status: SHIPPED | OUTPATIENT
Start: 2019-01-15 | End: 2020-05-08 | Stop reason: SDUPTHER

## 2019-01-15 NOTE — TELEPHONE ENCOUNTER
LOV: 10/25/2018 (chronic conditions).    Pt called for refill of alprazolam 0.25 mg. She is asking if she can have a greater quantity than #20, each script.

## 2019-01-22 ENCOUNTER — APPOINTMENT (OUTPATIENT)
Dept: URBAN - METROPOLITAN AREA CLINIC 200 | Age: 69
Setting detail: DERMATOLOGY
End: 2019-01-22

## 2019-01-22 DIAGNOSIS — L57.0 ACTINIC KERATOSIS: ICD-10-CM

## 2019-01-22 PROBLEM — D04.71 CARCINOMA IN SITU OF SKIN OF RIGHT LOWER LIMB, INCLUDING HIP: Status: ACTIVE | Noted: 2019-01-22

## 2019-01-22 PROCEDURE — OTHER LIQUID NITROGEN: OTHER

## 2019-01-22 PROCEDURE — 17000 DESTRUCT PREMALG LESION: CPT

## 2019-01-22 PROCEDURE — 17262 DSTRJ MAL LES T/A/L 1.1-2.0: CPT | Mod: 59

## 2019-01-22 PROCEDURE — OTHER CURETTAGE AND DESTRUCTION: OTHER

## 2019-01-22 PROCEDURE — 99212 OFFICE O/P EST SF 10 MIN: CPT | Mod: 25

## 2019-01-22 ASSESSMENT — LOCATION ZONE DERM: LOCATION ZONE: LEG

## 2019-01-22 ASSESSMENT — LOCATION DETAILED DESCRIPTION DERM: LOCATION DETAILED: RIGHT LATERAL PROXIMAL PRETIBIAL REGION

## 2019-01-22 ASSESSMENT — LOCATION SIMPLE DESCRIPTION DERM: LOCATION SIMPLE: RIGHT PRETIBIAL REGION

## 2019-01-22 NOTE — PROCEDURE: CURETTAGE AND DESTRUCTION
Hide Accession Number?: No
Bill As A Line Item Or As Units: Line Item
Post-Care Instructions: I reviewed with the patient in detail post-care instructions. Patient is to keep the area dry for 48 hours, and not to engage in any swimming until the area is healed. Should the patient develop any fevers, chills, bleeding, severe pain patient will contact the office immediately.
Size Of Lesion After Curettage: 1.2
Total Volume (Ccs): 1
Concentration (Mg/Ml Or Millions Of Plaque Forming Units/Cc): 0.01
What Was Performed First?: Curettage
Cautery Type: electrodesiccation
Consent was obtained from the patient. The risks, benefits and alternatives to therapy were discussed in detail. Specifically, the risks of infection, scarring, bleeding, prolonged wound healing, nerve injury, incomplete removal, allergy to anesthesia and recurrence were addressed. Alternatives to ED&C, such as: surgical removal and XRT were also discussed.  Prior to the procedure, the treatment site was clearly identified and confirmed by the patient. All components of Universal Protocol/PAUSE Rule completed.
Number Of Curettages: 3
Detail Level: Detailed
Anesthesia Type: 1% lidocaine with epinephrine
Additional Information: (Optional): The wound was cleaned, and a pressure dressing was applied.  The patient received detailed post-op instructions.

## 2019-05-07 RX ORDER — METFORMIN HYDROCHLORIDE 1000 MG/1
1000 TABLET ORAL 2 TIMES DAILY WITH MEALS
Qty: 180 TABLET | Refills: 3 | Status: SHIPPED | OUTPATIENT
Start: 2019-05-07 | End: 2020-01-31 | Stop reason: SDUPTHER

## 2019-06-19 RX ORDER — SIMVASTATIN 40 MG/1
TABLET, FILM COATED ORAL
Qty: 90 TABLET | Refills: 3 | Status: SHIPPED | OUTPATIENT
Start: 2019-06-19 | End: 2020-01-31 | Stop reason: SDUPTHER

## 2019-06-19 RX ORDER — FOSINOPRIL SODIUM 40 MG/1
TABLET ORAL
Qty: 90 TABLET | Refills: 3 | Status: SHIPPED | OUTPATIENT
Start: 2019-06-19 | End: 2020-01-31 | Stop reason: SDUPTHER

## 2019-06-19 NOTE — TELEPHONE ENCOUNTER
Medicine Refill Request    Last Office Visit: 10/25/2018  Next Office Visit: Visit date not found        Current Outpatient Prescriptions:   •  ALPRAZolam (XANAX) 0.25 mg tablet, Take 1 tablet (0.25 mg total) by mouth nightly as needed for anxiety., Disp: 90 tablet, Rfl: 0  •  aspirin 81 mg chewable tablet, 81 mg., Disp: , Rfl:   •  cetirizine (ZyrTEC) 10 mg tablet, 10 mg., Disp: , Rfl:   •  cholecalciferol, vitamin D3, (cholecalciferol) 1,000 unit tablet, take 2 tablets daily, Disp: , Rfl:   •  fosinopril (MONOPRIL) 40 mg tablet, Take 1 tablet (40 mg total) by mouth daily., Disp: 90 tablet, Rfl: 3  •  hydrochlorothiazide (HYDRODIURIL) 25 mg tablet, Take 1 tablet (25 mg total) by mouth daily., Disp: 90 tablet, Rfl: 3  •  metFORMIN (GLUCOPHAGE) 1,000 mg tablet, Take 1 tablet (1,000 mg total) by mouth 2 (two) times a day with meals., Disp: 180 tablet, Rfl: 3  •  nadolol (CORGARD) 40 mg tablet, Take 1 tablet (40 mg total) by mouth daily., Disp: 90 tablet, Rfl: 3  •  omega-3 fatty acids (FISH OIL CONCENTRATE) 1,000 mg capsule, 2 tablet daily, Disp: , Rfl:   •  simvastatin (ZOCOR) 40 mg tablet, Take 1 tablet (40 mg total) by mouth nightly., Disp: 90 tablet, Rfl: 3      BP Readings from Last 3 Encounters:   10/25/18 122/86   06/27/18 140/86   04/13/18 130/88       Recent Lab results:  Lab Results   Component Value Date    CHOL 169 10/25/2018   ,   Lab Results   Component Value Date    HDL 42 (L) 10/25/2018   ,   Lab Results   Component Value Date    LDLCALC 83 10/25/2018   ,   Lab Results   Component Value Date    TRIG 365 (H) 10/25/2018        Lab Results   Component Value Date    GLUCOSE 122 (H) 10/25/2018   ,   Lab Results   Component Value Date    HGBA1C 6.6 (H) 10/25/2018         Lab Results   Component Value Date    CREATININE 1.16 (H) 10/25/2018       Lab Results   Component Value Date    TSH 3.360 07/16/2016

## 2019-07-11 ENCOUNTER — APPOINTMENT (OUTPATIENT)
Dept: URBAN - METROPOLITAN AREA CLINIC 200 | Age: 69
Setting detail: DERMATOLOGY
End: 2019-07-11

## 2019-07-11 DIAGNOSIS — L21.8 OTHER SEBORRHEIC DERMATITIS: ICD-10-CM

## 2019-07-11 DIAGNOSIS — Z85.828 PERSONAL HISTORY OF OTHER MALIGNANT NEOPLASM OF SKIN: ICD-10-CM

## 2019-07-11 DIAGNOSIS — L57.0 ACTINIC KERATOSIS: ICD-10-CM

## 2019-07-11 DIAGNOSIS — L57.8 OTHER SKIN CHANGES DUE TO CHRONIC EXPOSURE TO NONIONIZING RADIATION: ICD-10-CM

## 2019-07-11 PROCEDURE — OTHER PRESCRIPTION: OTHER

## 2019-07-11 PROCEDURE — 17003 DESTRUCT PREMALG LES 2-14: CPT

## 2019-07-11 PROCEDURE — OTHER COUNSELING: OTHER

## 2019-07-11 PROCEDURE — 99213 OFFICE O/P EST LOW 20 MIN: CPT | Mod: 25

## 2019-07-11 PROCEDURE — OTHER LIQUID NITROGEN: OTHER

## 2019-07-11 PROCEDURE — 17000 DESTRUCT PREMALG LESION: CPT

## 2019-07-11 PROCEDURE — OTHER MIPS QUALITY: OTHER

## 2019-07-11 RX ORDER — NYSTATIN 100000 [USP'U]/G
CREAM TOPICAL
Qty: 1 | Refills: 4 | Status: ERX | COMMUNITY
Start: 2019-07-11

## 2019-07-11 ASSESSMENT — LOCATION SIMPLE DESCRIPTION DERM
LOCATION SIMPLE: LEFT FOREHEAD
LOCATION SIMPLE: LEFT CHEEK
LOCATION SIMPLE: RIGHT FOREARM
LOCATION SIMPLE: CHEST
LOCATION SIMPLE: RIGHT CHEEK
LOCATION SIMPLE: ABDOMEN

## 2019-07-11 ASSESSMENT — LOCATION DETAILED DESCRIPTION DERM
LOCATION DETAILED: LEFT MEDIAL SUPERIOR CHEST
LOCATION DETAILED: RIGHT PROXIMAL DORSAL FOREARM
LOCATION DETAILED: RIGHT INFERIOR MEDIAL MALAR CHEEK
LOCATION DETAILED: LEFT SUPERIOR FOREHEAD
LOCATION DETAILED: PERIUMBILICAL SKIN
LOCATION DETAILED: LEFT INFERIOR MEDIAL MALAR CHEEK

## 2019-07-11 ASSESSMENT — SEVERITY ASSESSMENT: HOW SEVERE IS THIS PATIENT'S CONDITION?: MILD

## 2019-07-11 ASSESSMENT — LOCATION ZONE DERM
LOCATION ZONE: ARM
LOCATION ZONE: TRUNK
LOCATION ZONE: FACE

## 2019-07-11 NOTE — PROCEDURE: LIQUID NITROGEN
Detail Level: Simple
Render Note In Bullet Format When Appropriate: No
Post-Care Instructions: I reviewed with the patient in detail post-care instructions. Patient is to wear sunprotection, and avoid picking at any of the treated lesions. Pt may apply Vaseline to crusted or scabbing areas.
Consent: The patient's consent was obtained including but not limited to risks of crusting, scabbing, blistering, scarring, darker or lighter pigmentary change, recurrence, incomplete removal and infection.
Duration Of Freeze Thaw-Cycle (Seconds): 2

## 2019-07-15 RX ORDER — NADOLOL 40 MG/1
TABLET ORAL
Qty: 90 TABLET | Refills: 3 | Status: SHIPPED | OUTPATIENT
Start: 2019-07-15 | End: 2020-01-31 | Stop reason: CLARIF

## 2019-07-15 NOTE — TELEPHONE ENCOUNTER
Medicine Refill Request    Last Office Visit: 10/25/2018  Next Office Visit: Visit date not found        Current Outpatient Prescriptions:   •  ALPRAZolam (XANAX) 0.25 mg tablet, Take 1 tablet (0.25 mg total) by mouth nightly as needed for anxiety., Disp: 90 tablet, Rfl: 0  •  aspirin 81 mg chewable tablet, 81 mg., Disp: , Rfl:   •  cetirizine (ZyrTEC) 10 mg tablet, 10 mg., Disp: , Rfl:   •  cholecalciferol, vitamin D3, (cholecalciferol) 1,000 unit tablet, take 2 tablets daily, Disp: , Rfl:   •  fosinopril (MONOPRIL) 40 mg tablet, TAKE 1 TABLET BY MOUTH EVERY DAY, Disp: 90 tablet, Rfl: 3  •  metFORMIN (GLUCOPHAGE) 1,000 mg tablet, Take 1 tablet (1,000 mg total) by mouth 2 (two) times a day with meals., Disp: 180 tablet, Rfl: 3  •  nadolol (CORGARD) 40 mg tablet, Take 1 tablet (40 mg total) by mouth daily., Disp: 90 tablet, Rfl: 3  •  omega-3 fatty acids (FISH OIL CONCENTRATE) 1,000 mg capsule, 2 tablet daily, Disp: , Rfl:   •  simvastatin (ZOCOR) 40 mg tablet, TAKE 1 TABLET BY MOUTH EVERY DAY AT NIGHT, Disp: 90 tablet, Rfl: 3      BP Readings from Last 3 Encounters:   10/25/18 122/86   06/27/18 140/86   04/13/18 130/88       Recent Lab results:  Lab Results   Component Value Date    CHOL 169 10/25/2018   ,   Lab Results   Component Value Date    HDL 42 (L) 10/25/2018   ,   Lab Results   Component Value Date    LDLCALC 83 10/25/2018   ,   Lab Results   Component Value Date    TRIG 365 (H) 10/25/2018        Lab Results   Component Value Date    GLUCOSE 122 (H) 10/25/2018   ,   Lab Results   Component Value Date    HGBA1C 6.6 (H) 10/25/2018         Lab Results   Component Value Date    CREATININE 1.16 (H) 10/25/2018       Lab Results   Component Value Date    TSH 3.360 07/16/2016

## 2019-08-27 ENCOUNTER — TRANSCRIBE ORDERS (OUTPATIENT)
Dept: SCHEDULING | Age: 69
End: 2019-08-27

## 2019-08-27 DIAGNOSIS — R10.2 PELVIC AND PERINEAL PAIN: ICD-10-CM

## 2019-08-27 DIAGNOSIS — Z12.31 ENCOUNTER FOR SCREENING MAMMOGRAM FOR MALIGNANT NEOPLASM OF BREAST: Primary | ICD-10-CM

## 2019-09-10 ENCOUNTER — HOSPITAL ENCOUNTER (OUTPATIENT)
Dept: RADIOLOGY | Facility: HOSPITAL | Age: 69
Discharge: HOME | End: 2019-09-10
Attending: OBSTETRICS & GYNECOLOGY
Payer: MEDICARE

## 2019-09-10 DIAGNOSIS — R10.2 PELVIC AND PERINEAL PAIN: ICD-10-CM

## 2019-09-10 DIAGNOSIS — Z12.31 ENCOUNTER FOR SCREENING MAMMOGRAM FOR MALIGNANT NEOPLASM OF BREAST: ICD-10-CM

## 2019-09-10 PROCEDURE — 77067 SCR MAMMO BI INCL CAD: CPT

## 2019-09-10 PROCEDURE — 76856 US EXAM PELVIC COMPLETE: CPT

## 2020-01-31 ENCOUNTER — OFFICE VISIT (OUTPATIENT)
Dept: FAMILY MEDICINE | Facility: CLINIC | Age: 70
End: 2020-01-31
Payer: MEDICARE

## 2020-01-31 VITALS
HEIGHT: 64 IN | DIASTOLIC BLOOD PRESSURE: 90 MMHG | SYSTOLIC BLOOD PRESSURE: 132 MMHG | HEART RATE: 76 BPM | WEIGHT: 138 LBS | TEMPERATURE: 97.9 F | RESPIRATION RATE: 18 BRPM | BODY MASS INDEX: 23.56 KG/M2

## 2020-01-31 DIAGNOSIS — I10 BENIGN ESSENTIAL HYPERTENSION: ICD-10-CM

## 2020-01-31 DIAGNOSIS — E78.2 MIXED HYPERLIPIDEMIA: ICD-10-CM

## 2020-01-31 DIAGNOSIS — Z11.59 ENCOUNTER FOR HEPATITIS C SCREENING TEST FOR LOW RISK PATIENT: ICD-10-CM

## 2020-01-31 DIAGNOSIS — Z00.00 MEDICARE ANNUAL WELLNESS VISIT, SUBSEQUENT: Primary | ICD-10-CM

## 2020-01-31 DIAGNOSIS — E11.9 TYPE 2 DIABETES MELLITUS WITHOUT COMPLICATION, WITHOUT LONG-TERM CURRENT USE OF INSULIN (CMS/HCC): ICD-10-CM

## 2020-01-31 PROCEDURE — G0439 PPPS, SUBSEQ VISIT: HCPCS | Performed by: FAMILY MEDICINE

## 2020-01-31 PROCEDURE — 36415 COLL VENOUS BLD VENIPUNCTURE: CPT | Performed by: FAMILY MEDICINE

## 2020-01-31 RX ORDER — METOPROLOL SUCCINATE 25 MG/1
12.5 TABLET, EXTENDED RELEASE ORAL DAILY
Qty: 135 TABLET | Refills: 1 | Status: SHIPPED | OUTPATIENT
Start: 2020-01-31 | End: 2020-03-26 | Stop reason: SDUPTHER

## 2020-01-31 ASSESSMENT — ENCOUNTER SYMPTOMS
SLEEP DISTURBANCE: 1
BRUISES/BLEEDS EASILY: 0
SINUS PAIN: 0
UNEXPECTED WEIGHT CHANGE: 1
DIZZINESS: 0
SINUS PRESSURE: 0
POLYDIPSIA: 0
NERVOUS/ANXIOUS: 1
APPETITE CHANGE: 1
DIFFICULTY URINATING: 0
ABDOMINAL PAIN: 0
NAUSEA: 0
ADENOPATHY: 0
POLYPHAGIA: 0
FEVER: 0
CHILLS: 0
SHORTNESS OF BREATH: 0
COUGH: 0
VOMITING: 0
BACK PAIN: 0
HEADACHES: 0
NECK PAIN: 0
PALPITATIONS: 1
DIARRHEA: 0
CONSTIPATION: 0
FREQUENCY: 0

## 2020-01-31 ASSESSMENT — ACTIVITIES OF DAILY LIVING (ADL)
ADEQUATE_TO_COMPLETE_ADL: YES
PATIENT'S MEMORY ADEQUATE TO SAFELY COMPLETE DAILY ACTIVITIES?: YES

## 2020-01-31 NOTE — PATIENT INSTRUCTIONS
Your Personalized Prevention Plan Services (PPPS)    Preventive Services Checklist (Assumes Average Risk Unless Otherwise Noted):    Health Maintenance Topics with due status: Overdue       Topic Date Due    DEXA Scan 1950    Annual Dilated Retinal Exam 11/11/1961    Diabetic Foot Exam 11/11/1961    Zoster Vaccine 11/11/2001    Annual Falls Risk Screening 11/11/2016    Urine Protein Screening 06/21/2019    Medicare Annual Wellness Visit 06/27/2019    Influenza Vaccine 08/01/2019    Hemoglobin A1C 10/25/2019    Hepatitis C Screening 01/31/2020     Health Maintenance Topics with due status: Not Due       Topic Last Completion Date    Colonoscopy 12/02/2011    DTaP, Tdap, and Td Vaccines 03/06/2014    Mammogram 09/10/2019     Health Maintenance Topics with due status: Completed       Topic Last Completion Date    Pneumococcal (65 years and older) 06/27/2018     Health Maintenance Topics with due status: Aged Out       Topic Date Due    Meningococcal ACWY Aged Out    Varicella Vaccines Aged Out    HIB Vaccines Aged Out    IPV Vaccines Aged Out    HPV Vaccines Aged Out    Pneumococcal Aged Out       You May Be Eligible for These Additional Preventive Services   (Assumes Average Risk Unless Otherwise Noted)  Diabetes Screening Any 1 risk factor: hypertension, dyslipidemia, obesity, high glucose; or Any 2 risk factors: >=66yo, overweight, family history diabetes (covered every 6 months)   Hepatitis C Screening Any 1 risk factor: 1) blood transfusion before 1992,   2) current or past injection drug use (annually for high risk; if born between 4965-[birth year]886, see above for status).   Vaccine: Hepatitis B As necessary if at-risk: hemophilia, ESRD, diabetes, living with individual infected with hep B, healthcare worker with frequent contact with blood/bodily fluids (series covered once)   Sexually Transmitted Diseases (STDs) As necessary chlamydia, gonorrhea, syphilis, hepatitis B (covered  annually)  HIV if any 1 risk factor present: 1) <16yo or >64yo and at increased risk or 2) 15-64yo and ask for it (covered annually)   Lung Cancer Screening Low dose chest CT if all 3 risk factors: 1) 55-76yo, 2) smoker or quit within last 15y, 3) >=30 pack years (covered annually).  No results found for this or any previous visit.     Cholesterol Screening Both risk factors: 1) >=21yo and 2)  increased risk coronary artery disease (covered every 5 years).     Breast Cancer Screening Covered once 35-38yo, annually >=39yo (if >=51yo, see above for status).     Glaucoma Screening Any 1 risk factor: 1) diabetes, 2) family history glaucoma, 3)  >=51yo, 4)  American >=64yo (covered annually)           Health Risk Factors with Personalized Education:  ----------------------------------------------------------------------------------------------------------------------  Stress management:  Understanding Your Stress  · Think about how you know when you’re stressed.  · Think about how your thoughts or behaviors are different when you’re stressed.  · Think about what triggers stress for you.  · Think about how you handle stress, and whether you’re making unhealthy choices in response to stress (like smoking, drinking alcohol or overeating).  Managing Stress  · Take a break from the stressful situation.  · Reduce your stress levels by exercising, talking with family/friends, ensuring adequate sleep.  · Consider meditation or yoga.  · Make sure you plan time to do things you enjoy.  · Let your PCP know if you’re having problems limiting your stress.  ----------------------------------------------------------------------------------------------------------------------  Controlling Your Blood Pressure  · Maintain a normal weight (body mass index between 18.5 and 24.9).  · Eat more fruit, vegetables and low-fat dairy.  · Eat less saturated fat and total fat.  · Lower your sodium (salt) intake.  Try to stay  under 1500 mg per day, but if you cannot get your intake to be that low, at least lower it by 1000 mg.  · Stay active.  Try to get at least 90 to 150 minutes of exercise per week.  Try brisk walking, swimming, bicycling or dancing.  · Limit alcohol intake.  When you do consume alcohol, drink no more than 1 drink per day.  · If you have been prescribed medication, take it regularly and exactly as prescribed.  Let your PCP know if you have any problems or questions about your medication.  · Check your blood pressure at home or at the store.  Write down your readings and share them with your PCP  ----------------------------------------------------------------------------------------------------------------------  Controlling Your Diabetes  · Stress can raise your blood sugar.  Learn what causes your stress.  Learn to lower your stress by spending time with family and friends, exercising, deep breathing, trying meditation or yoga, enjoying hobbies and getting enough rest.  Let your PCP know if you’re having problems limiting your stress.  · Maintain a healthy weight by balancing your diet and exercise.  · Choose foods that are lower in calories, saturated fat, trans fat, sugar and salt.  Eat foods with more fiber, like whole grain cereals, bread, crackers, rice or pasta.  Choose to eat fruit, vegetables, and low-fat or fat-free/skim dairy.  · Reduce the portion size of your meals.  Make half of your meal vegetables and fruit, and divide the other half between lean protein and whole grains.  · Drink water instead of juice and regular soda.  · Spend at least some time being active on most days of the week.  · Work to increase your muscle strength at least twice per week.  Try things like light weights, stretch bands, yoga, heavy gardening (digging, planting with tools) or push-ups.  · If you have been prescribed medication, take it regularly and exactly as prescribed.  Let your PCP know if you have any problems or  questions about your medication.  · If you have been asked to check your blood sugar, write down your readings and share them with your PCP  ----------------------------------------------------------------------------------------------------------------------  Controlling Your Cholesterol  · Reduce the amount of saturated and trans fat in your diet.  Limit intake of red meat.  Consume only low-fat or non-fat/skim dairy.  Limit fried food.  Cook with vegetable oils.  · Reduce your intake of sugary foods, sugary drinks and alcohol.  · Eat a diet high in fruit, vegetables and whole grains.  · Get protein from fish, poultry and a small portion of nuts.  · Stay active.  Try to get at least 90 to 150 minutes of exercise per week.  Try brisk walking, swimming, bicycling or dancing.  · Maintain a healthy weight by balancing your diet and exercise.  · If you have been prescribed medication, take it regularly and exactly as prescribed. Let your PCP know if you have any problems or questions about your medication.  · It’s important to know your cholesterol numbers.  When recommended by your PCP, get the cholesterol blood test.  ----------------------------------------------------------------------------------------------------------------------  Maintaining Strong Bones  · Try to get at least 90 to 150 minutes of weight-bearing exercise per week.  · Ensure intake of at least 1200mg of calcium per day.  Eat foods high in calcium like milk and other dairy, green vegetables, fruit, canned fish with soft and edible bones, nuts, calcium-set tofu.  Some foods are calcium-fortified, like bread, cereal, fruit juices and mineral water.  · Help your body make vitamin D by getting 10-15 minutes per day of sunlight.    · Ensure intake of at least 600IU of vitamin D per day.  Eat foods high in vitamin D like oily fish (salmon, sardines, mackerel) and eggs.  Some foods are fortified with vitamin D, like dairy and cereals.  · Avoid high  amounts of caffeine and salt, since they can cause the body to loose calcium.  · Limit alcohol intake, since it is associated with weaker bones and is associated with falls and fractures.  · Limit intake of fizzy drinks.  ----------------------------------------------------------------------------------------------------------------------  Reducing Your Risk of Falls  · Tell your PCP if any of your medications make you feel tired, dizzy, lightheaded or off-balance.  · Maintain coordination, flexibility and balance by ensuring regular physical activity.  · Limit alcohol intake to 1 drink per day.  Consider avoiding all alcohol intake.  · Ensure good vision.  Visit an ophthalmologist or optometrist regularly for vision screening or to make sure your glasses / contact lens prescription is correct.  If you need glasses or contacts, wear them.  When you get new glasses or contacts, take time to get used to them.  Do not wear sunglasses or tinted lenses when indoors.  · Ensure good hearing.  Have your hearing checked if you are having trouble hearing, or family and friends think you cannot hear them.  If you need a hearing aid, be sure it fits well and wear it.  · Get enough rest.  Ensure about 7-9 hours of sleep every day.  · Get up slowly from your bed or chairs.  Do not start walking until you are sure you feel steady.  · Wear non-skid, rubber-soled, low-heeled shoes.  Do not walk in socks, or in shoes and slippers with smooth soles.  · If your PCP or therapist recommends using a cane or walker, use it regularly.  · Make your home safer.  Increase lighting throughout the house, especially at the top and bottom of stairs.  Ensure lighting is easily turned on when getting up in the middle of the night.  Make sure there are two secure rails on all stairs.  Install grab bars in the bathtub / shower and near the toilet.  Consider using a shower chair and / or a hand-held shower.  · Spread sand or salt on icy surfaces.   Beware of wet surfaces, which can be icy.  · Tell your PCP if you have fallen.

## 2020-01-31 NOTE — PROGRESS NOTES
"Subjective      Patient ID: Vianney Bone is a 69 y.o. female.  1950      Patient presents for annual wellness visit.  Increase stress, weight loss and anxiety.       The following have been reviewed and updated as appropriate in this visit:  Tobacco  Allergies  Meds  Problems  Med Hx  Surg Hx  Fam Hx       Review of Systems   Constitutional: Positive for appetite change and unexpected weight change. Negative for chills and fever.   HENT: Negative for dental problem, sinus pressure and sinus pain.    Eyes: Negative for visual disturbance.   Respiratory: Negative for cough and shortness of breath.    Cardiovascular: Positive for palpitations. Negative for chest pain and leg swelling.   Gastrointestinal: Negative for abdominal pain, constipation, diarrhea, nausea and vomiting.   Endocrine: Negative for cold intolerance, heat intolerance, polydipsia, polyphagia and polyuria.   Genitourinary: Negative for difficulty urinating, frequency and urgency.   Musculoskeletal: Negative for back pain and neck pain.   Allergic/Immunologic: Negative for environmental allergies and food allergies.   Neurological: Negative for dizziness and headaches.   Hematological: Negative for adenopathy. Does not bruise/bleed easily.   Psychiatric/Behavioral: Positive for sleep disturbance. Negative for behavioral problems. The patient is nervous/anxious.        Objective     Vitals:    01/31/20 1019   BP: 132/90   BP Location: Left upper arm   Patient Position: Sitting   Pulse: 76   Resp: 18   Temp: 36.6 °C (97.9 °F)   Weight: 62.6 kg (138 lb)   Height: 1.626 m (5' 4\")     Body mass index is 23.69 kg/m².    Physical Exam   Constitutional: She is oriented to person, place, and time. She appears well-developed and well-nourished.   HENT:   Head: Normocephalic and atraumatic.   Nose: Nose normal.   Mouth/Throat: Oropharynx is clear and moist.   Eyes: Pupils are equal, round, and reactive to light. Conjunctivae and EOM are normal. "   Neck: Neck supple. No thyromegaly present.   Cardiovascular: Normal rate, regular rhythm and normal heart sounds.   No murmur heard.  Pulmonary/Chest: Effort normal and breath sounds normal. She has no wheezes.   Abdominal: Soft. Bowel sounds are normal. There is no tenderness.   Musculoskeletal: Normal range of motion.   Lymphadenopathy:     She has no cervical adenopathy.   Neurological: She is alert and oriented to person, place, and time.   Skin: Skin is warm and dry. No rash noted.   Psychiatric: She has a normal mood and affect. Her behavior is normal.       Assessment/Plan   Diagnoses and all orders for this visit:    Medicare annual wellness visit, subsequent (Primary)  Assessment & Plan:  70 yo female presents for annual wellness visit  Forms completed, reviewed and scanned      Mixed hyperlipidemia  Assessment & Plan:  Stable  FBW today    Orders:  -     Lipid panel    Type 2 diabetes mellitus without complication, without long-term current use of insulin (CMS/[facility])  Assessment & Plan:  Stable  Check FBW today  Continue current meds    Orders:  -     Comprehensive metabolic panel  -     Hemoglobin A1c  -     Microalbumin / creatinine urine ratio  -     Urinalysis with Reflex Culture    Benign essential hypertension  Assessment & Plan:  Stable  Continue current meds    Orders:  -     CBC and differential    Encounter for hepatitis C screening test for low risk patient  -     Hepatitis C antibody    Other orders  -     metoprolol succinate XL (TOPROL-XL) 25 mg 24 hr tablet; Take 0.5 tablets (12.5 mg total) by mouth daily.  -     ALPRAZolam (XANAX) 0.25 mg tablet; Take 1 tablet (0.25 mg total) by mouth nightly as needed for anxiety.  -     hydrochlorothiazide (HYDRODIURIL) 25 mg tablet; Take 1 tablet (25 mg total) by mouth once daily.  -     fosinopril (MONOPRIL) 40 mg tablet; Take 1 tablet (40 mg total) by mouth once daily.  -     simvastatin (ZOCOR) 40 mg tablet; Take 0.5 tablets (20 mg total) by mouth  nightly.  -     metFORMIN (GLUCOPHAGE) 1,000 mg tablet; Take 1 tablet (1,000 mg total) by mouth 2 (two) times a day with meals.  -     MICROSCOPIC EXAMINIATION  -     Hepatitis C antibody      Subjective     Vianney Bone is a 69 y.o. female who presents for a subsequent annual wellness visit.     Comprehensive Medical and Social History  Patient Active Problem List   Diagnosis   • Benign essential hypertension   • Diabetes mellitus (CMS/HCC)   • Mixed hyperlipidemia   • Squamous cell cancer of skin of nose   • Medicare annual wellness visit, subsequent   • Immunization due     Past Medical History:   Diagnosis Date   • Diabetes mellitus (CMS/HCC) 1/3/2011    Overview:    • Hypertension    • Mixed hyperlipidemia 1/3/2011    Overview:    • Squamous cell cancer of skin of nose 6/27/2018     Past Surgical History:   Procedure Laterality Date   • SQUAMOUS CELL CARCINOMA EXCISION       No Known Allergies  Current Outpatient Medications   Medication Sig Dispense Refill   • ALPRAZolam (XANAX) 0.25 mg tablet Take 1 tablet (0.25 mg total) by mouth nightly as needed for anxiety. 90 tablet 0   • cholecalciferol, vitamin D3, (cholecalciferol) 1,000 unit tablet take 2 tablets daily     • fosinopril (MONOPRIL) 40 mg tablet Take 1 tablet (40 mg total) by mouth once daily. 90 tablet 3   • hydrochlorothiazide (HYDRODIURIL) 25 mg tablet Take 1 tablet (25 mg total) by mouth once daily. 90 tablet 3   • metFORMIN (GLUCOPHAGE) 1,000 mg tablet Take 1 tablet (1,000 mg total) by mouth 2 (two) times a day with meals. 180 tablet 3   • omega-3 fatty acids (FISH OIL CONCENTRATE) 1,000 mg capsule 2 tablet daily     • simvastatin (ZOCOR) 40 mg tablet Take 0.5 tablets (20 mg total) by mouth nightly. 90 tablet 3   • ALPRAZolam (XANAX) 0.25 mg tablet Take 1 tablet (0.25 mg total) by mouth nightly as needed for anxiety. 30 tablet 0   • aspirin 81 mg chewable tablet 81 mg.     • cetirizine (ZyrTEC) 10 mg tablet 10 mg.     • metoprolol succinate XL  "(TOPROL-XL) 25 mg 24 hr tablet Take 0.5 tablets (12.5 mg total) by mouth daily. 135 tablet 1     No current facility-administered medications for this visit.      Social History     Tobacco Use   • Smoking status: Never Smoker   • Smokeless tobacco: Never Used   Substance Use Topics   • Alcohol use: No   • Drug use: Not on file       Objective   Vitals  Vitals:    20 1019   BP: 132/90   BP Location: Left upper arm   Patient Position: Sitting   Pulse: 76   Resp: 18   Temp: 36.6 °C (97.9 °F)   Weight: 62.6 kg (138 lb)   Height: 1.626 m (5' 4\")     Body mass index is 23.69 kg/m².    Advanced Care Plan  Does patient have advance directive?: Yes       Patient has Advance Directive: Patient has Advance Directive, has not brought in                             PHQ  Have You Felt Down, Depressed or Hopeless?: yes  Have You Felt Little Interest or Pleasure in Doing Things?: no  Little Interest or Pleasure in Doing Things: 0-->not at all   Feeling Down, Depressed or Hopeless: 1-->several days   Trouble Falling or Staying Asleep, or Sleeping Too Much: 1-->several days   Feeling Tired or Having Little Energy: 1-->several days   Poor Appetite or Overeatin-->several days   Feeling Bad about Yourself - or that You are a Failure or Have Let Yourself or Your Family Down: 0-->not at all   Trouble Concentrating on Things, Such as Reading the Newspaper or Watching Television: 0-->not at all   Moving or Speaking So Slowly that Other People Could Have Noticed? Or the Opposite - Being So Fidgety: 0-->not at all   Thoughts that You Would be Better Off Dead or of Hurting Yourself in Some Way: 0-->not at all       Mini Cog   2/2 clock test  3/3 word recall    Get Up and Go  Result: Pass            STEADI Falls Risk  One or more falls in the last year: No           Has trouble stepping up onto a curb: No   Advised to use a cane or walker to get around safely: No   Often has to rush to the toilet: No   Feels unsteady when walking: No "   Has lost some feeling in feet: No   Often feels sad or depressed: No   Steadies self on furniture while walking at home: No   Takes medication that makes him/her feel lightheaded or more tired than usual: No   Worried about falling: No   Takes medicine to sleep or improve mood: No   Needs to push with hands when rising from a chair: No   Falls screen completed: Yes       Hearing and Vision Screening  No exam data present  See HRA for relevant hearing screening response.      Assessment/Plan   Diagnoses and all orders for this visit:    Medicare annual wellness visit, subsequent (Primary)  Assessment & Plan:  70 yo female presents for annual wellness visit  Forms completed, reviewed and scanned      Mixed hyperlipidemia  Assessment & Plan:  Stable  FBW today    Orders:  -     Lipid panel    Type 2 diabetes mellitus without complication, without long-term current use of insulin (CMS/[facility])  Assessment & Plan:  Stable  Check FBW today  Continue current meds    Orders:  -     Comprehensive metabolic panel  -     Hemoglobin A1c  -     Microalbumin / creatinine urine ratio  -     Urinalysis with Reflex Culture    Benign essential hypertension  Assessment & Plan:  Stable  Continue current meds    Orders:  -     CBC and differential    Encounter for hepatitis C screening test for low risk patient  -     Hepatitis C antibody    Other orders  -     metoprolol succinate XL (TOPROL-XL) 25 mg 24 hr tablet; Take 0.5 tablets (12.5 mg total) by mouth daily.  -     ALPRAZolam (XANAX) 0.25 mg tablet; Take 1 tablet (0.25 mg total) by mouth nightly as needed for anxiety.  -     hydrochlorothiazide (HYDRODIURIL) 25 mg tablet; Take 1 tablet (25 mg total) by mouth once daily.  -     fosinopril (MONOPRIL) 40 mg tablet; Take 1 tablet (40 mg total) by mouth once daily.  -     simvastatin (ZOCOR) 40 mg tablet; Take 0.5 tablets (20 mg total) by mouth nightly.  -     metFORMIN (GLUCOPHAGE) 1,000 mg tablet; Take 1 tablet (1,000 mg total) by  mouth 2 (two) times a day with meals.  -     MICROSCOPIC EXAMINIATION  -     Hepatitis C antibody      See Patient Instructions (the written plan) which was given to the patient for PPPS and health risk factors with interventions.

## 2020-02-01 LAB
ALBUMIN SERPL-MCNC: 4.4 G/DL (ref 3.8–4.8)
ALBUMIN/CREAT UR: 15 MG/G CREAT (ref 0–29)
ALBUMIN/GLOB SERPL: 1.6 {RATIO} (ref 1.2–2.2)
ALP SERPL-CCNC: 44 IU/L (ref 39–117)
ALT SERPL-CCNC: 21 IU/L (ref 0–32)
APPEARANCE UR: CLEAR
AST SERPL-CCNC: 24 IU/L (ref 0–40)
BACTERIA #/AREA URNS HPF: NORMAL /[HPF]
BASOPHILS # BLD AUTO: 0 X10E3/UL (ref 0–0.2)
BASOPHILS NFR BLD AUTO: 0 %
BILIRUB SERPL-MCNC: 0.6 MG/DL (ref 0–1.2)
BILIRUB UR QL STRIP: NEGATIVE
BUN SERPL-MCNC: 16 MG/DL (ref 8–27)
BUN/CREAT SERPL: 15 (ref 12–28)
CALCIUM SERPL-MCNC: 10.6 MG/DL (ref 8.7–10.3)
CHLORIDE SERPL-SCNC: 96 MMOL/L (ref 96–106)
CHOLEST SERPL-MCNC: 150 MG/DL (ref 100–199)
CO2 SERPL-SCNC: 24 MMOL/L (ref 20–29)
COLOR UR: YELLOW
CREAT SERPL-MCNC: 1.08 MG/DL (ref 0.57–1)
CREAT UR-MCNC: 111 MG/DL
EOSINOPHIL # BLD AUTO: 0.8 X10E3/UL (ref 0–0.4)
EOSINOPHIL NFR BLD AUTO: 10 %
EPI CELLS #/AREA URNS HPF: NORMAL /HPF (ref 0–10)
ERYTHROCYTE [DISTWIDTH] IN BLOOD BY AUTOMATED COUNT: 13.5 % (ref 11.7–15.4)
GLOBULIN SER CALC-MCNC: 2.7 G/DL (ref 1.5–4.5)
GLUCOSE SERPL-MCNC: 140 MG/DL (ref 65–99)
GLUCOSE UR QL: NEGATIVE
HBA1C MFR BLD: 6.7 % (ref 4.8–5.6)
HCT VFR BLD AUTO: 38.7 % (ref 34–46.6)
HCV AB S/CO SERPL IA: 0.1 S/CO RATIO (ref 0–0.9)
HDLC SERPL-MCNC: 50 MG/DL
HGB BLD-MCNC: 13.1 G/DL (ref 11.1–15.9)
HGB UR QL STRIP: NEGATIVE
IMM GRANULOCYTES # BLD AUTO: 0 X10E3/UL (ref 0–0.1)
IMM GRANULOCYTES NFR BLD AUTO: 0 %
KETONES UR QL STRIP: NEGATIVE
LAB CORP EGFR IF AFRICN AM: 61 ML/MIN/1.73
LAB CORP EGFR IF NONAFRICN AM: 53 ML/MIN/1.73
LAB CORP URINALYSIS REFLEX: ABNORMAL
LDLC SERPL CALC-MCNC: 57 MG/DL (ref 0–99)
LEUKOCYTE ESTERASE UR QL STRIP: NEGATIVE
LYMPHOCYTES # BLD AUTO: 1.2 X10E3/UL (ref 0.7–3.1)
LYMPHOCYTES NFR BLD AUTO: 15 %
MCH RBC QN AUTO: 28.9 PG (ref 26.6–33)
MCHC RBC AUTO-ENTMCNC: 33.9 G/DL (ref 31.5–35.7)
MCV RBC AUTO: 85 FL (ref 79–97)
MICRO URNS: ABNORMAL
MICROALBUMIN UR-MCNC: 16.6 UG/ML
MONOCYTES # BLD AUTO: 0.6 X10E3/UL (ref 0.1–0.9)
MONOCYTES NFR BLD AUTO: 7 %
MUCOUS THREADS URNS QL MICRO: PRESENT
NEUTROPHILS # BLD AUTO: 5.1 X10E3/UL (ref 1.4–7)
NEUTROPHILS NFR BLD AUTO: 68 %
NITRITE UR QL STRIP: NEGATIVE
PH UR STRIP: 6.5 [PH] (ref 5–7.5)
PLATELET # BLD AUTO: 291 X10E3/UL (ref 150–450)
POTASSIUM SERPL-SCNC: 4.3 MMOL/L (ref 3.5–5.2)
PROT SERPL-MCNC: 7.1 G/DL (ref 6–8.5)
PROT UR QL STRIP: ABNORMAL
RBC # BLD AUTO: 4.54 X10E6/UL (ref 3.77–5.28)
RBC #/AREA URNS HPF: NORMAL /HPF (ref 0–2)
SODIUM SERPL-SCNC: 141 MMOL/L (ref 134–144)
SP GR UR: 1.02 (ref 1–1.03)
TRIGL SERPL-MCNC: 214 MG/DL (ref 0–149)
UROBILINOGEN UR STRIP-MCNC: 0.2 MG/DL (ref 0.2–1)
VLDLC SERPL CALC-MCNC: 43 MG/DL (ref 5–40)
WBC # BLD AUTO: 7.6 X10E3/UL (ref 3.4–10.8)
WBC #/AREA URNS HPF: NORMAL /HPF (ref 0–5)

## 2020-02-03 RX ORDER — METFORMIN HYDROCHLORIDE 1000 MG/1
1000 TABLET ORAL 2 TIMES DAILY WITH MEALS
Qty: 180 TABLET | Refills: 3 | Status: SHIPPED | OUTPATIENT
Start: 2020-02-03 | End: 2021-03-15 | Stop reason: SDUPTHER

## 2020-02-03 RX ORDER — FOSINOPRIL SODIUM 40 MG/1
40 TABLET ORAL
Qty: 90 TABLET | Refills: 3 | Status: SHIPPED | OUTPATIENT
Start: 2020-02-03 | End: 2021-03-10

## 2020-02-03 RX ORDER — SIMVASTATIN 40 MG/1
20 TABLET, FILM COATED ORAL NIGHTLY
Qty: 90 TABLET | Refills: 3 | Status: SHIPPED | OUTPATIENT
Start: 2020-02-03 | End: 2020-03-06 | Stop reason: SDUPTHER

## 2020-02-03 RX ORDER — ALPRAZOLAM 0.25 MG/1
0.25 TABLET ORAL NIGHTLY PRN
Qty: 30 TABLET | Refills: 0 | Status: SHIPPED | OUTPATIENT
Start: 2020-02-03 | End: 2020-03-04

## 2020-02-03 RX ORDER — HYDROCHLOROTHIAZIDE 25 MG/1
25 TABLET ORAL
Qty: 90 TABLET | Refills: 3 | Status: SHIPPED | OUTPATIENT
Start: 2020-02-03 | End: 2021-01-27

## 2020-02-03 NOTE — RESULT ENCOUNTER NOTE
Notify patient HgbA1c good at 6.7, kidney function stable. Cholesterol and LDL good. Trigs high but better at 214.  Blood count normal.  Protein in her urine.  Will need to monitor this.  Calcium was elevated at 10.6, stop any calcium supplements.  Repeat BMP in 4 weeks.

## 2020-02-04 ENCOUNTER — TELEPHONE (OUTPATIENT)
Dept: FAMILY MEDICINE | Facility: CLINIC | Age: 70
End: 2020-02-04

## 2020-02-04 NOTE — TELEPHONE ENCOUNTER
----- Message from Mer Mills DO sent at 2/3/2020  4:05 PM EST -----  Notify patient HgbA1c good at 6.7, kidney function stable. Cholesterol and LDL good. Trigs high but better at 214.  Blood count normal.  Protein in her urine.  Will need to monitor this.  Calcium was elevated at 10.6, stop any calcium supplements.  Repeat BMP in 4 weeks.

## 2020-02-12 ENCOUNTER — APPOINTMENT (OUTPATIENT)
Dept: URBAN - METROPOLITAN AREA CLINIC 200 | Age: 70
Setting detail: DERMATOLOGY
End: 2020-02-26

## 2020-02-12 DIAGNOSIS — L57.8 OTHER SKIN CHANGES DUE TO CHRONIC EXPOSURE TO NONIONIZING RADIATION: ICD-10-CM

## 2020-02-12 DIAGNOSIS — L57.0 ACTINIC KERATOSIS: ICD-10-CM

## 2020-02-12 DIAGNOSIS — Z85.828 PERSONAL HISTORY OF OTHER MALIGNANT NEOPLASM OF SKIN: ICD-10-CM

## 2020-02-12 PROCEDURE — OTHER COUNSELING: OTHER

## 2020-02-12 PROCEDURE — OTHER MIPS QUALITY: OTHER

## 2020-02-12 PROCEDURE — 17000 DESTRUCT PREMALG LESION: CPT

## 2020-02-12 PROCEDURE — OTHER LIQUID NITROGEN: OTHER

## 2020-02-12 PROCEDURE — 99213 OFFICE O/P EST LOW 20 MIN: CPT | Mod: 25

## 2020-02-12 ASSESSMENT — LOCATION SIMPLE DESCRIPTION DERM
LOCATION SIMPLE: CHEST
LOCATION SIMPLE: RIGHT FOREARM
LOCATION SIMPLE: LEFT ANTERIOR NECK

## 2020-02-12 ASSESSMENT — LOCATION ZONE DERM
LOCATION ZONE: TRUNK
LOCATION ZONE: ARM
LOCATION ZONE: NECK

## 2020-02-12 ASSESSMENT — LOCATION DETAILED DESCRIPTION DERM
LOCATION DETAILED: LEFT MEDIAL SUPERIOR CHEST
LOCATION DETAILED: LEFT INFERIOR ANTERIOR NECK
LOCATION DETAILED: RIGHT DISTAL DORSAL FOREARM

## 2020-02-12 NOTE — PROCEDURE: LIQUID NITROGEN
Post-Care Instructions: I reviewed with the patient in detail post-care instructions. Patient is to wear sunprotection, and avoid picking at any of the treated lesions. Pt may apply Vaseline to crusted or scabbing areas.
Detail Level: Detailed
Duration Of Freeze Thaw-Cycle (Seconds): 2
Render Post-Care Instructions In Note?: no
Consent: The patient's consent was obtained including but not limited to risks of crusting, scabbing, blistering, scarring, darker or lighter pigmentary change, recurrence, incomplete removal and infection.
Number Of Freeze-Thaw Cycles: 1 freeze-thaw cycle

## 2020-03-03 ENCOUNTER — TELEPHONE (OUTPATIENT)
Dept: FAMILY MEDICINE | Facility: CLINIC | Age: 70
End: 2020-03-03

## 2020-03-03 NOTE — TELEPHONE ENCOUNTER
Pt called and stated that when she picked up her simvastatin from the pharmacy she only received 45 tablets because the script is written for 1/2 tablet nightly, she said that she has been taking a whole tablet for years and doesn't know why the script was changed, but would like a new script sent to the pharmacy.    She is aware you are out of the office and said this was fine to wait until you return

## 2020-03-06 RX ORDER — SIMVASTATIN 40 MG/1
40 TABLET, FILM COATED ORAL NIGHTLY
Qty: 90 TABLET | Refills: 3 | Status: SHIPPED | OUTPATIENT
Start: 2020-03-06 | End: 2020-06-19 | Stop reason: SDUPTHER

## 2020-03-11 ENCOUNTER — TELEPHONE (OUTPATIENT)
Dept: FAMILY MEDICINE | Facility: CLINIC | Age: 70
End: 2020-03-11

## 2020-03-11 NOTE — TELEPHONE ENCOUNTER
Pt's daughter Meli leal, states her b/p is still 200/100 even after taking xanax, Meli wants to know if she should give her metoprolol? Meli said that if pt has SOB or heaviness she will take her to the ER

## 2020-03-11 NOTE — TELEPHONE ENCOUNTER
Patient just found son dead. EMT's came to the house. They took her BP and it was 200/100. They advised her to contact PCP for help...(I am assuming she refused to go to ED) but she did not state that in her message. Patient of Dr. Mills.  RN to triage call and direct patient.

## 2020-03-11 NOTE — TELEPHONE ENCOUNTER
Spoke with patient, told her to take a xanax when she gets home.  Monitor BP and call if it remains elevated.  I spoke with her daughter, Meli, as well and gave her the instructions.

## 2020-03-11 NOTE — TELEPHONE ENCOUNTER
Spoke with patients daughter.  Patient is currently unavailable as she is with her son who just passed.  Explained that with her BP being so high and given her comorbidities it would be best for her to go to the ED for treatment.  Wanted to know if there was a pill that we could call in and advised that Dr. Mills is not in until this afternoon.  EMS is still at the scene and will defer to them at this time.

## 2020-03-11 NOTE — TELEPHONE ENCOUNTER
She can give her another 1/2 dose of metoprolol and check BP in an hour and call back with reading.

## 2020-03-13 NOTE — TELEPHONE ENCOUNTER
Spoke with patient's daughter, Meli and she is not doing well.  /96.  126/67.  She can continue taking whole tablet daily and she can take xanax as needed.

## 2020-03-26 RX ORDER — METOPROLOL SUCCINATE 25 MG/1
25 TABLET, EXTENDED RELEASE ORAL DAILY
Qty: 90 TABLET | Refills: 1 | Status: SHIPPED | OUTPATIENT
Start: 2020-03-26 | End: 2020-09-16

## 2020-03-26 NOTE — TELEPHONE ENCOUNTER
Medicine Refill Request    Last Office Visit: 1/31/2020  Next Office Visit: 4/30/2020        Current Outpatient Medications:   •  ALPRAZolam (XANAX) 0.25 mg tablet, Take 1 tablet (0.25 mg total) by mouth nightly as needed for anxiety., Disp: 90 tablet, Rfl: 0  •  aspirin 81 mg chewable tablet, 81 mg., Disp: , Rfl:   •  cetirizine (ZyrTEC) 10 mg tablet, 10 mg., Disp: , Rfl:   •  cholecalciferol, vitamin D3, (cholecalciferol) 1,000 unit tablet, take 2 tablets daily, Disp: , Rfl:   •  fosinopril (MONOPRIL) 40 mg tablet, Take 1 tablet (40 mg total) by mouth once daily., Disp: 90 tablet, Rfl: 3  •  hydrochlorothiazide (HYDRODIURIL) 25 mg tablet, Take 1 tablet (25 mg total) by mouth once daily., Disp: 90 tablet, Rfl: 3  •  metFORMIN (GLUCOPHAGE) 1,000 mg tablet, Take 1 tablet (1,000 mg total) by mouth 2 (two) times a day with meals., Disp: 180 tablet, Rfl: 3  •  metoprolol succinate XL (TOPROL-XL) 25 mg 24 hr tablet, Take 0.5 tablets (12.5 mg total) by mouth daily., Disp: 135 tablet, Rfl: 1  •  omega-3 fatty acids (FISH OIL CONCENTRATE) 1,000 mg capsule, 2 tablet daily, Disp: , Rfl:   •  simvastatin (ZOCOR) 40 mg tablet, Take 1 tablet (40 mg total) by mouth nightly., Disp: 90 tablet, Rfl: 3      BP Readings from Last 3 Encounters:   01/31/20 132/90   10/25/18 122/86   06/27/18 140/86       Recent Lab results:  Lab Results   Component Value Date    CHOL 150 01/31/2020   ,   Lab Results   Component Value Date    HDL 50 01/31/2020   ,   Lab Results   Component Value Date    LDLCALC 57 01/31/2020   ,   Lab Results   Component Value Date    TRIG 214 (H) 01/31/2020        Lab Results   Component Value Date    GLUCOSE 140 (H) 01/31/2020   ,   Lab Results   Component Value Date    HGBA1C 6.7 (H) 01/31/2020         Lab Results   Component Value Date    CREATININE 1.08 (H) 01/31/2020       Lab Results   Component Value Date    TSH 3.360 07/16/2016

## 2020-05-08 RX ORDER — ALPRAZOLAM 0.25 MG/1
0.25 TABLET ORAL NIGHTLY PRN
Qty: 90 TABLET | Refills: 0 | Status: SHIPPED | OUTPATIENT
Start: 2020-05-08 | End: 2020-10-28

## 2020-05-08 NOTE — TELEPHONE ENCOUNTER
Medicine Refill Request    Last Office Visit: 1/31/2020  Last Telemedicine Visit: Visit date not found    Next Office Visit: 5/29/2020  Next Telemedicine Visit: Visit date not found         Current Outpatient Medications:   •  ALPRAZolam (XANAX) 0.25 mg tablet, Take 1 tablet (0.25 mg total) by mouth nightly as needed for anxiety., Disp: 90 tablet, Rfl: 0  •  aspirin 81 mg chewable tablet, 81 mg., Disp: , Rfl:   •  cetirizine (ZyrTEC) 10 mg tablet, 10 mg., Disp: , Rfl:   •  cholecalciferol, vitamin D3, (cholecalciferol) 1,000 unit tablet, take 2 tablets daily, Disp: , Rfl:   •  fosinopril (MONOPRIL) 40 mg tablet, Take 1 tablet (40 mg total) by mouth once daily., Disp: 90 tablet, Rfl: 3  •  hydrochlorothiazide (HYDRODIURIL) 25 mg tablet, Take 1 tablet (25 mg total) by mouth once daily., Disp: 90 tablet, Rfl: 3  •  metFORMIN (GLUCOPHAGE) 1,000 mg tablet, Take 1 tablet (1,000 mg total) by mouth 2 (two) times a day with meals., Disp: 180 tablet, Rfl: 3  •  metoprolol succinate XL (TOPROL-XL) 25 mg 24 hr tablet, Take 1 tablet (25 mg total) by mouth daily., Disp: 90 tablet, Rfl: 1  •  omega-3 fatty acids (FISH OIL CONCENTRATE) 1,000 mg capsule, 2 tablet daily, Disp: , Rfl:   •  simvastatin (ZOCOR) 40 mg tablet, Take 1 tablet (40 mg total) by mouth nightly., Disp: 90 tablet, Rfl: 3      BP Readings from Last 3 Encounters:   01/31/20 132/90   10/25/18 122/86   06/27/18 140/86       Recent Lab results:  Lab Results   Component Value Date    CHOL 150 01/31/2020   ,   Lab Results   Component Value Date    HDL 50 01/31/2020   ,   Lab Results   Component Value Date    LDLCALC 57 01/31/2020   ,   Lab Results   Component Value Date    TRIG 214 (H) 01/31/2020        Lab Results   Component Value Date    GLUCOSE 140 (H) 01/31/2020   ,   Lab Results   Component Value Date    HGBA1C 6.7 (H) 01/31/2020         Lab Results   Component Value Date    CREATININE 1.08 (H) 01/31/2020       Lab Results   Component Value Date    TSH 3.360  07/16/2016

## 2020-06-19 ENCOUNTER — OFFICE VISIT (OUTPATIENT)
Dept: FAMILY MEDICINE | Facility: CLINIC | Age: 70
End: 2020-06-19
Payer: MEDICARE

## 2020-06-19 VITALS
SYSTOLIC BLOOD PRESSURE: 138 MMHG | BODY MASS INDEX: 23.76 KG/M2 | TEMPERATURE: 97.4 F | RESPIRATION RATE: 16 BRPM | WEIGHT: 139.2 LBS | HEIGHT: 64 IN | HEART RATE: 89 BPM | DIASTOLIC BLOOD PRESSURE: 76 MMHG

## 2020-06-19 DIAGNOSIS — K62.89 RECTAL PAIN: ICD-10-CM

## 2020-06-19 DIAGNOSIS — F32.9 REACTIVE DEPRESSION: ICD-10-CM

## 2020-06-19 DIAGNOSIS — I10 BENIGN ESSENTIAL HYPERTENSION: Primary | ICD-10-CM

## 2020-06-19 DIAGNOSIS — E11.69 TYPE 2 DIABETES MELLITUS WITH OTHER SPECIFIED COMPLICATION, WITHOUT LONG-TERM CURRENT USE OF INSULIN: ICD-10-CM

## 2020-06-19 PROCEDURE — 99214 OFFICE O/P EST MOD 30 MIN: CPT | Performed by: FAMILY MEDICINE

## 2020-06-19 RX ORDER — SERTRALINE HYDROCHLORIDE 25 MG/1
25 TABLET, FILM COATED ORAL DAILY
Qty: 90 TABLET | Refills: 3 | Status: SHIPPED | OUTPATIENT
Start: 2020-06-19 | End: 2021-10-05 | Stop reason: ALTCHOICE

## 2020-06-19 RX ORDER — SIMVASTATIN 40 MG/1
40 TABLET, FILM COATED ORAL NIGHTLY
Qty: 90 TABLET | Refills: 3 | Status: SHIPPED | OUTPATIENT
Start: 2020-06-19 | End: 2021-03-05

## 2020-06-19 ASSESSMENT — ENCOUNTER SYMPTOMS
HEADACHES: 0
PALPITATIONS: 0
ABDOMINAL PAIN: 0
LIGHT-HEADEDNESS: 0
APPETITE CHANGE: 1
COUGH: 0
SLEEP DISTURBANCE: 1
DIZZINESS: 0
SHORTNESS OF BREATH: 0

## 2020-06-19 NOTE — PROGRESS NOTES
"Subjective      Patient ID: Vianney Bone is a 69 y.o. female.  1950      Patient presents for follow-up to her chronic medical conditions.  She has been having depression related to grief secondary to some passing of her son.  Her  is battling with ALS.  She denies any chest pain or shortness of breath.  No headaches or dizziness.  She has a decreased appetite.  She is not sleeping well.  She did notice an episode of severe rectal pain about 2 months ago and had an episode of mucousy stool as well as 1 episode of blood in the stool.  This subsided and has not returned since that time.  Her last colonoscopy was 9 years ago.  She currently denies any abdominal pain.  No nausea or vomiting.      The following have been reviewed and updated as appropriate in this visit:  Tobacco       Review of Systems   Constitutional: Positive for appetite change.   Respiratory: Negative for cough and shortness of breath.    Cardiovascular: Negative for chest pain, palpitations and leg swelling.   Gastrointestinal: Negative for abdominal pain.   Neurological: Negative for dizziness, light-headedness and headaches.   Psychiatric/Behavioral: Positive for sleep disturbance.        Depression       Objective     Vitals:    06/19/20 1129 06/19/20 1145   BP: (!) 148/78 138/76   BP Location:  Left upper arm   Patient Position:  Sitting   Pulse: 89    Resp: 16    Temp: 36.3 °C (97.4 °F)    Weight: 63.1 kg (139 lb 3.2 oz)    Height: 1.626 m (5' 4\")      Body mass index is 23.89 kg/m².    Physical Exam   Constitutional: She appears well-developed and well-nourished.   Cardiovascular: Normal rate, regular rhythm and normal heart sounds.   No murmur heard.  Pulmonary/Chest: Effort normal and breath sounds normal. No respiratory distress. She has no wheezes. She has no rales.   Abdominal: Soft. Bowel sounds are normal. She exhibits no distension. There is no tenderness. There is no guarding.   Psychiatric: She exhibits a depressed " mood.       Assessment/Plan   Diagnoses and all orders for this visit:    Benign essential hypertension (Primary)  Assessment & Plan:  Stable  Continue current meds      Type 2 diabetes mellitus with other specified complication, without long-term current use of insulin (CMS/[facility])  Assessment & Plan:  Stable  Continue current meds  FBW next office visit      Reactive depression  Assessment & Plan:  Recent loss of her son   with ALS  Start Sertraline 25mg daily  Refer to therapist  Follow up in 8 weeks      Rectal pain  Assessment & Plan:  Brief episode with blood in stool and mucus  She will need colonoscopy, she will follow up with [facility]       Other orders  -     simvastatin (ZOCOR) 40 mg tablet; Take 1 tablet (40 mg total) by mouth nightly.  -     sertraline (ZOLOFT) 25 mg tablet; Take 1 tablet (25 mg total) by mouth daily.

## 2020-06-19 NOTE — ASSESSMENT & PLAN NOTE
Brief episode with blood in stool and mucus  She will need colonoscopy, she will follow up with [facility]

## 2020-09-15 NOTE — TELEPHONE ENCOUNTER
Medicine Refill Request    Last Office Visit: 6/19/2020  Last Telemedicine Visit: Visit date not found    Next Office Visit: Visit date not found  Next Telemedicine Visit: Visit date not found         Current Outpatient Medications:   •  ALPRAZolam (XANAX) 0.25 mg tablet, Take 1 tablet (0.25 mg total) by mouth nightly as needed for anxiety., Disp: 90 tablet, Rfl: 0  •  aspirin 81 mg chewable tablet, 81 mg., Disp: , Rfl:   •  cholecalciferol, vitamin D3, (cholecalciferol) 1,000 unit tablet, take 2 tablets daily, Disp: , Rfl:   •  fosinopril (MONOPRIL) 40 mg tablet, Take 1 tablet (40 mg total) by mouth once daily., Disp: 90 tablet, Rfl: 3  •  hydrochlorothiazide (HYDRODIURIL) 25 mg tablet, Take 1 tablet (25 mg total) by mouth once daily., Disp: 90 tablet, Rfl: 3  •  metFORMIN (GLUCOPHAGE) 1,000 mg tablet, Take 1 tablet (1,000 mg total) by mouth 2 (two) times a day with meals., Disp: 180 tablet, Rfl: 3  •  metoprolol succinate XL (TOPROL-XL) 25 mg 24 hr tablet, Take 1 tablet (25 mg total) by mouth daily., Disp: 90 tablet, Rfl: 1  •  omega-3 fatty acids (FISH OIL CONCENTRATE) 1,000 mg capsule, 2 tablet daily, Disp: , Rfl:   •  sertraline (ZOLOFT) 25 mg tablet, Take 1 tablet (25 mg total) by mouth daily., Disp: 90 tablet, Rfl: 3  •  simvastatin (ZOCOR) 40 mg tablet, Take 1 tablet (40 mg total) by mouth nightly., Disp: 90 tablet, Rfl: 3      BP Readings from Last 3 Encounters:   06/19/20 138/76   01/31/20 132/90   10/25/18 122/86       Recent Lab results:  Lab Results   Component Value Date    CHOL 150 01/31/2020   ,   Lab Results   Component Value Date    HDL 50 01/31/2020   ,   Lab Results   Component Value Date    LDLCALC 57 01/31/2020   ,   Lab Results   Component Value Date    TRIG 214 (H) 01/31/2020        Lab Results   Component Value Date    GLUCOSE 140 (H) 01/31/2020   ,   Lab Results   Component Value Date    HGBA1C 6.7 (H) 01/31/2020         Lab Results   Component Value Date    CREATININE 1.08 (H) 01/31/2020        Lab Results   Component Value Date    TSH 3.360 07/16/2016

## 2020-09-16 ENCOUNTER — TRANSCRIBE ORDERS (OUTPATIENT)
Dept: RADIOLOGY | Facility: HOSPITAL | Age: 70
End: 2020-09-16

## 2020-09-16 ENCOUNTER — HOSPITAL ENCOUNTER (OUTPATIENT)
Dept: RADIOLOGY | Facility: HOSPITAL | Age: 70
Discharge: HOME | End: 2020-09-16
Attending: OBSTETRICS & GYNECOLOGY
Payer: MEDICARE

## 2020-09-16 DIAGNOSIS — Z12.31 ENCOUNTER FOR SCREENING MAMMOGRAM FOR MALIGNANT NEOPLASM OF BREAST: Primary | ICD-10-CM

## 2020-09-16 DIAGNOSIS — Z12.31 ENCOUNTER FOR SCREENING MAMMOGRAM FOR MALIGNANT NEOPLASM OF BREAST: ICD-10-CM

## 2020-09-16 PROCEDURE — 77067 SCR MAMMO BI INCL CAD: CPT

## 2020-09-16 RX ORDER — METOPROLOL SUCCINATE 25 MG/1
TABLET, EXTENDED RELEASE ORAL
Qty: 90 TABLET | Refills: 1 | Status: SHIPPED | OUTPATIENT
Start: 2020-09-16 | End: 2021-03-10

## 2020-10-28 RX ORDER — ALPRAZOLAM 0.25 MG/1
TABLET ORAL
Qty: 90 TABLET | Refills: 0 | Status: SHIPPED | OUTPATIENT
Start: 2020-10-28 | End: 2021-03-15 | Stop reason: SDUPTHER

## 2020-10-28 NOTE — TELEPHONE ENCOUNTER
Medicine Refill Request    Last Office Visit: 6/19/2020  Last Telemedicine Visit: Visit date not found    Next Office Visit: Visit date not found  Next Telemedicine Visit: Visit date not found         Current Outpatient Medications:   •  ALPRAZolam (XANAX) 0.25 mg tablet, Take 1 tablet (0.25 mg total) by mouth nightly as needed for anxiety., Disp: 90 tablet, Rfl: 0  •  aspirin 81 mg chewable tablet, 81 mg., Disp: , Rfl:   •  cholecalciferol, vitamin D3, (cholecalciferol) 1,000 unit tablet, take 2 tablets daily, Disp: , Rfl:   •  fosinopril (MONOPRIL) 40 mg tablet, Take 1 tablet (40 mg total) by mouth once daily., Disp: 90 tablet, Rfl: 3  •  hydrochlorothiazide (HYDRODIURIL) 25 mg tablet, Take 1 tablet (25 mg total) by mouth once daily., Disp: 90 tablet, Rfl: 3  •  metFORMIN (GLUCOPHAGE) 1,000 mg tablet, Take 1 tablet (1,000 mg total) by mouth 2 (two) times a day with meals., Disp: 180 tablet, Rfl: 3  •  metoprolol succinate XL (TOPROL-XL) 25 mg 24 hr tablet, TAKE 1 TABLET BY MOUTH EVERY DAY, Disp: 90 tablet, Rfl: 1  •  omega-3 fatty acids (FISH OIL CONCENTRATE) 1,000 mg capsule, 2 tablet daily, Disp: , Rfl:   •  sertraline (ZOLOFT) 25 mg tablet, Take 1 tablet (25 mg total) by mouth daily., Disp: 90 tablet, Rfl: 3  •  simvastatin (ZOCOR) 40 mg tablet, Take 1 tablet (40 mg total) by mouth nightly., Disp: 90 tablet, Rfl: 3      BP Readings from Last 3 Encounters:   06/19/20 138/76   01/31/20 132/90   10/25/18 122/86       Recent Lab results:  Lab Results   Component Value Date    CHOL 150 01/31/2020   ,   Lab Results   Component Value Date    HDL 50 01/31/2020   ,   Lab Results   Component Value Date    LDLCALC 57 01/31/2020   ,   Lab Results   Component Value Date    TRIG 214 (H) 01/31/2020        Lab Results   Component Value Date    GLUCOSE 140 (H) 01/31/2020   ,   Lab Results   Component Value Date    HGBA1C 6.7 (H) 01/31/2020         Lab Results   Component Value Date    CREATININE 1.08 (H) 01/31/2020       Lab  Results   Component Value Date    TSH 3.360 07/16/2016

## 2021-01-27 RX ORDER — HYDROCHLOROTHIAZIDE 25 MG/1
TABLET ORAL
Qty: 90 TABLET | Refills: 3 | Status: SHIPPED | OUTPATIENT
Start: 2021-01-27 | End: 2022-04-14

## 2021-01-27 NOTE — TELEPHONE ENCOUNTER
Medicine Refill Request    Last Office Visit: 6/19/2020  Last Telemedicine Visit: Visit date not found    Next Office Visit: Visit date not found  Next Telemedicine Visit: Visit date not found         Current Outpatient Medications:   •  ALPRAZolam (XANAX) 0.25 mg tablet, TAKE 1 TABLET BY MOUTH NIGHTLY AS NEEDED FOR ANXIETY, Disp: 90 tablet, Rfl: 0  •  aspirin 81 mg chewable tablet, 81 mg., Disp: , Rfl:   •  cholecalciferol, vitamin D3, (cholecalciferol) 1,000 unit tablet, take 2 tablets daily, Disp: , Rfl:   •  fosinopril (MONOPRIL) 40 mg tablet, Take 1 tablet (40 mg total) by mouth once daily., Disp: 90 tablet, Rfl: 3  •  hydrochlorothiazide (HYDRODIURIL) 25 mg tablet, Take 1 tablet (25 mg total) by mouth once daily., Disp: 90 tablet, Rfl: 3  •  metFORMIN (GLUCOPHAGE) 1,000 mg tablet, Take 1 tablet (1,000 mg total) by mouth 2 (two) times a day with meals., Disp: 180 tablet, Rfl: 3  •  metoprolol succinate XL (TOPROL-XL) 25 mg 24 hr tablet, TAKE 1 TABLET BY MOUTH EVERY DAY, Disp: 90 tablet, Rfl: 1  •  omega-3 fatty acids (FISH OIL CONCENTRATE) 1,000 mg capsule, 2 tablet daily, Disp: , Rfl:   •  sertraline (ZOLOFT) 25 mg tablet, Take 1 tablet (25 mg total) by mouth daily., Disp: 90 tablet, Rfl: 3  •  simvastatin (ZOCOR) 40 mg tablet, Take 1 tablet (40 mg total) by mouth nightly., Disp: 90 tablet, Rfl: 3      BP Readings from Last 3 Encounters:   06/19/20 138/76   01/31/20 132/90   10/25/18 122/86       Recent Lab results:  Lab Results   Component Value Date    CHOL 150 01/31/2020   ,   Lab Results   Component Value Date    HDL 50 01/31/2020   ,   Lab Results   Component Value Date    LDLCALC 57 01/31/2020   ,   Lab Results   Component Value Date    TRIG 214 (H) 01/31/2020        Lab Results   Component Value Date    GLUCOSE 140 (H) 01/31/2020   ,   Lab Results   Component Value Date    HGBA1C 6.7 (H) 01/31/2020         Lab Results   Component Value Date    CREATININE 1.08 (H) 01/31/2020       Lab Results   Component  Value Date    TSH 3.360 07/16/2016

## 2021-03-05 RX ORDER — SIMVASTATIN 40 MG/1
TABLET, FILM COATED ORAL
Qty: 45 TABLET | Refills: 7 | Status: SHIPPED | OUTPATIENT
Start: 2021-03-05 | End: 2021-07-13

## 2021-03-05 NOTE — TELEPHONE ENCOUNTER
Medicine Refill Request    Last Office Visit: 6/19/2020  Last Telemedicine Visit: Visit date not found    Next Office Visit: Visit date not found  Next Telemedicine Visit: Visit date not found         Current Outpatient Medications:   •  ALPRAZolam (XANAX) 0.25 mg tablet, TAKE 1 TABLET BY MOUTH NIGHTLY AS NEEDED FOR ANXIETY, Disp: 90 tablet, Rfl: 0  •  aspirin 81 mg chewable tablet, 81 mg., Disp: , Rfl:   •  cholecalciferol, vitamin D3, (cholecalciferol) 1,000 unit tablet, take 2 tablets daily, Disp: , Rfl:   •  fosinopril (MONOPRIL) 40 mg tablet, Take 1 tablet (40 mg total) by mouth once daily., Disp: 90 tablet, Rfl: 3  •  hydrochlorothiazide (HYDRODIURIL) 25 mg tablet, TAKE 1 TABLET BY MOUTH EVERY DAY, Disp: 90 tablet, Rfl: 3  •  metFORMIN (GLUCOPHAGE) 1,000 mg tablet, Take 1 tablet (1,000 mg total) by mouth 2 (two) times a day with meals., Disp: 180 tablet, Rfl: 3  •  metoprolol succinate XL (TOPROL-XL) 25 mg 24 hr tablet, TAKE 1 TABLET BY MOUTH EVERY DAY, Disp: 90 tablet, Rfl: 1  •  omega-3 fatty acids (FISH OIL CONCENTRATE) 1,000 mg capsule, 2 tablet daily, Disp: , Rfl:   •  sertraline (ZOLOFT) 25 mg tablet, Take 1 tablet (25 mg total) by mouth daily., Disp: 90 tablet, Rfl: 3  •  simvastatin (ZOCOR) 40 mg tablet, Take 1 tablet (40 mg total) by mouth nightly., Disp: 90 tablet, Rfl: 3      BP Readings from Last 3 Encounters:   06/19/20 138/76   01/31/20 132/90   10/25/18 122/86       Recent Lab results:  Lab Results   Component Value Date    CHOL 150 01/31/2020   ,   Lab Results   Component Value Date    HDL 50 01/31/2020   ,   Lab Results   Component Value Date    LDLCALC 57 01/31/2020   ,   Lab Results   Component Value Date    TRIG 214 (H) 01/31/2020        Lab Results   Component Value Date    GLUCOSE 140 (H) 01/31/2020   ,   Lab Results   Component Value Date    HGBA1C 6.7 (H) 01/31/2020         Lab Results   Component Value Date    CREATININE 1.08 (H) 01/31/2020       Lab Results   Component Value Date     TSH 3.360 07/16/2016

## 2021-03-10 RX ORDER — METOPROLOL SUCCINATE 25 MG/1
TABLET, EXTENDED RELEASE ORAL
Qty: 90 TABLET | Refills: 1 | Status: SHIPPED | OUTPATIENT
Start: 2021-03-10 | End: 2021-07-14

## 2021-03-10 RX ORDER — FOSINOPRIL SODIUM 40 MG/1
TABLET ORAL
Qty: 90 TABLET | Refills: 3 | Status: SHIPPED | OUTPATIENT
Start: 2021-03-10 | End: 2022-03-07

## 2021-03-10 NOTE — TELEPHONE ENCOUNTER
Medicine Refill Request    Last Office Visit: 6/19/2020  Last Telemedicine Visit: Visit date not found    Next Office Visit: Visit date not found  Next Telemedicine Visit: Visit date not found         Current Outpatient Medications:   •  ALPRAZolam (XANAX) 0.25 mg tablet, TAKE 1 TABLET BY MOUTH NIGHTLY AS NEEDED FOR ANXIETY, Disp: 90 tablet, Rfl: 0  •  aspirin 81 mg chewable tablet, 81 mg., Disp: , Rfl:   •  cholecalciferol, vitamin D3, (cholecalciferol) 1,000 unit tablet, take 2 tablets daily, Disp: , Rfl:   •  fosinopril (MONOPRIL) 40 mg tablet, Take 1 tablet (40 mg total) by mouth once daily., Disp: 90 tablet, Rfl: 3  •  hydrochlorothiazide (HYDRODIURIL) 25 mg tablet, TAKE 1 TABLET BY MOUTH EVERY DAY, Disp: 90 tablet, Rfl: 3  •  metFORMIN (GLUCOPHAGE) 1,000 mg tablet, Take 1 tablet (1,000 mg total) by mouth 2 (two) times a day with meals., Disp: 180 tablet, Rfl: 3  •  metoprolol succinate XL (TOPROL-XL) 25 mg 24 hr tablet, TAKE 1 TABLET BY MOUTH EVERY DAY, Disp: 90 tablet, Rfl: 1  •  omega-3 fatty acids (FISH OIL CONCENTRATE) 1,000 mg capsule, 2 tablet daily, Disp: , Rfl:   •  sertraline (ZOLOFT) 25 mg tablet, Take 1 tablet (25 mg total) by mouth daily., Disp: 90 tablet, Rfl: 3  •  simvastatin (ZOCOR) 40 mg tablet, TAKE 0.5 TABLETS (20 MG TOTAL) BY MOUTH NIGHTLY., Disp: 45 tablet, Rfl: 7      BP Readings from Last 3 Encounters:   06/19/20 138/76   01/31/20 132/90   10/25/18 122/86       Recent Lab results:  Lab Results   Component Value Date    CHOL 150 01/31/2020   ,   Lab Results   Component Value Date    HDL 50 01/31/2020   ,   Lab Results   Component Value Date    LDLCALC 57 01/31/2020   ,   Lab Results   Component Value Date    TRIG 214 (H) 01/31/2020        Lab Results   Component Value Date    GLUCOSE 140 (H) 01/31/2020   ,   Lab Results   Component Value Date    HGBA1C 6.7 (H) 01/31/2020         Lab Results   Component Value Date    CREATININE 1.08 (H) 01/31/2020       Lab Results   Component Value Date     TSH 3.360 07/16/2016

## 2021-03-15 RX ORDER — ALPRAZOLAM 0.25 MG/1
0.25 TABLET ORAL NIGHTLY PRN
Qty: 90 TABLET | Refills: 0 | Status: SHIPPED | OUTPATIENT
Start: 2021-03-15 | End: 2021-06-18 | Stop reason: SDUPTHER

## 2021-03-15 RX ORDER — METFORMIN HYDROCHLORIDE 1000 MG/1
1000 TABLET ORAL 2 TIMES DAILY WITH MEALS
Qty: 180 TABLET | Refills: 0 | Status: SHIPPED | OUTPATIENT
Start: 2021-03-15 | End: 2021-06-07

## 2021-03-15 NOTE — TELEPHONE ENCOUNTER
Medicine Refill Request    Last Office Visit: 6/19/2020  Last Telemedicine Visit: Visit date not found    Next Office Visit: Visit date not found  Next Telemedicine Visit: Visit date not found         Current Outpatient Medications:   •  ALPRAZolam (XANAX) 0.25 mg tablet, TAKE 1 TABLET BY MOUTH NIGHTLY AS NEEDED FOR ANXIETY, Disp: 90 tablet, Rfl: 0  •  aspirin 81 mg chewable tablet, 81 mg., Disp: , Rfl:   •  cholecalciferol, vitamin D3, (cholecalciferol) 1,000 unit tablet, take 2 tablets daily, Disp: , Rfl:   •  fosinopriL (MONOPRIL) 40 mg tablet, TAKE 1 TABLET BY MOUTH EVERY DAY, Disp: 90 tablet, Rfl: 3  •  hydrochlorothiazide (HYDRODIURIL) 25 mg tablet, TAKE 1 TABLET BY MOUTH EVERY DAY, Disp: 90 tablet, Rfl: 3  •  metFORMIN (GLUCOPHAGE) 1,000 mg tablet, Take 1 tablet (1,000 mg total) by mouth 2 (two) times a day with meals., Disp: 180 tablet, Rfl: 3  •  metoprolol succinate XL (TOPROL-XL) 25 mg 24 hr tablet, TAKE 1 TABLET BY MOUTH EVERY DAY, Disp: 90 tablet, Rfl: 1  •  omega-3 fatty acids (FISH OIL CONCENTRATE) 1,000 mg capsule, 2 tablet daily, Disp: , Rfl:   •  sertraline (ZOLOFT) 25 mg tablet, Take 1 tablet (25 mg total) by mouth daily., Disp: 90 tablet, Rfl: 3  •  simvastatin (ZOCOR) 40 mg tablet, TAKE 0.5 TABLETS (20 MG TOTAL) BY MOUTH NIGHTLY., Disp: 45 tablet, Rfl: 7      BP Readings from Last 3 Encounters:   06/19/20 138/76   01/31/20 132/90   10/25/18 122/86       Recent Lab results:  Lab Results   Component Value Date    CHOL 150 01/31/2020   ,   Lab Results   Component Value Date    HDL 50 01/31/2020   ,   Lab Results   Component Value Date    LDLCALC 57 01/31/2020   ,   Lab Results   Component Value Date    TRIG 214 (H) 01/31/2020        Lab Results   Component Value Date    GLUCOSE 140 (H) 01/31/2020   ,   Lab Results   Component Value Date    HGBA1C 6.7 (H) 01/31/2020         Lab Results   Component Value Date    CREATININE 1.08 (H) 01/31/2020       Lab Results   Component Value Date    TSH 3.360  07/16/2016

## 2021-04-16 ENCOUNTER — APPOINTMENT (OUTPATIENT)
Dept: URBAN - METROPOLITAN AREA CLINIC 200 | Age: 71
Setting detail: DERMATOLOGY
End: 2021-04-25

## 2021-04-16 DIAGNOSIS — L71.8 OTHER ROSACEA: ICD-10-CM

## 2021-04-16 DIAGNOSIS — D18.0 HEMANGIOMA: ICD-10-CM

## 2021-04-16 DIAGNOSIS — L57.8 OTHER SKIN CHANGES DUE TO CHRONIC EXPOSURE TO NONIONIZING RADIATION: ICD-10-CM

## 2021-04-16 DIAGNOSIS — L57.0 ACTINIC KERATOSIS: ICD-10-CM

## 2021-04-16 DIAGNOSIS — Z85.828 PERSONAL HISTORY OF OTHER MALIGNANT NEOPLASM OF SKIN: ICD-10-CM

## 2021-04-16 PROBLEM — D18.01 HEMANGIOMA OF SKIN AND SUBCUTANEOUS TISSUE: Status: ACTIVE | Noted: 2021-04-16

## 2021-04-16 PROCEDURE — OTHER REASSURANCE: OTHER

## 2021-04-16 PROCEDURE — 17000 DESTRUCT PREMALG LESION: CPT

## 2021-04-16 PROCEDURE — 99213 OFFICE O/P EST LOW 20 MIN: CPT | Mod: 25

## 2021-04-16 PROCEDURE — OTHER LIQUID NITROGEN: OTHER

## 2021-04-16 PROCEDURE — 17003 DESTRUCT PREMALG LES 2-14: CPT

## 2021-04-16 PROCEDURE — OTHER COUNSELING: OTHER

## 2021-04-16 ASSESSMENT — LOCATION DETAILED DESCRIPTION DERM
LOCATION DETAILED: LEFT MEDIAL SUPERIOR CHEST
LOCATION DETAILED: RIGHT SUPERIOR MEDIAL FOREHEAD
LOCATION DETAILED: LEFT INFERIOR ANTERIOR NECK
LOCATION DETAILED: RIGHT MID TEMPLE
LOCATION DETAILED: NASAL DORSUM
LOCATION DETAILED: LEFT LATERAL ABDOMEN

## 2021-04-16 ASSESSMENT — LOCATION ZONE DERM
LOCATION ZONE: NOSE
LOCATION ZONE: FACE
LOCATION ZONE: NECK
LOCATION ZONE: TRUNK

## 2021-04-16 ASSESSMENT — LOCATION SIMPLE DESCRIPTION DERM
LOCATION SIMPLE: RIGHT FOREHEAD
LOCATION SIMPLE: NOSE
LOCATION SIMPLE: LEFT ANTERIOR NECK
LOCATION SIMPLE: ABDOMEN
LOCATION SIMPLE: RIGHT TEMPLE
LOCATION SIMPLE: CHEST

## 2021-04-16 ASSESSMENT — PAIN INTENSITY VAS: HOW INTENSE IS YOUR PAIN 0 BEING NO PAIN, 10 BEING THE MOST SEVERE PAIN POSSIBLE?: NO PAIN

## 2021-04-16 NOTE — PROCEDURE: LIQUID NITROGEN
Number Of Freeze-Thaw Cycles: 1 freeze-thaw cycle
Render Note In Bullet Format When Appropriate: No
Duration Of Freeze Thaw-Cycle (Seconds): 2
Post-Care Instructions: I reviewed with the patient in detail post-care instructions. Patient is to wear sunprotection, and avoid picking at any of the treated lesions. Pt may apply Vaseline to crusted or scabbing areas.
Consent: The patient's consent was obtained including but not limited to risks of crusting, scabbing, blistering, scarring, darker or lighter pigmentary change, recurrence, incomplete removal and infection.
Detail Level: Detailed

## 2021-06-07 RX ORDER — METFORMIN HYDROCHLORIDE 1000 MG/1
TABLET ORAL
Qty: 180 TABLET | Refills: 0 | Status: SHIPPED | OUTPATIENT
Start: 2021-06-07 | End: 2021-07-14

## 2021-06-07 NOTE — TELEPHONE ENCOUNTER
Medicine Refill Request    Last Office Visit: 6/19/2020  Last Telemedicine Visit: Visit date not found    Next Office Visit: Visit date not found  Next Telemedicine Visit: Visit date not found         Current Outpatient Medications:   •  ALPRAZolam (XANAX) 0.25 mg tablet, Take 1 tablet (0.25 mg total) by mouth nightly as needed for anxiety., Disp: 90 tablet, Rfl: 0  •  aspirin 81 mg chewable tablet, 81 mg., Disp: , Rfl:   •  cholecalciferol, vitamin D3, (cholecalciferol) 1,000 unit tablet, take 2 tablets daily, Disp: , Rfl:   •  fosinopriL (MONOPRIL) 40 mg tablet, TAKE 1 TABLET BY MOUTH EVERY DAY, Disp: 90 tablet, Rfl: 3  •  hydrochlorothiazide (HYDRODIURIL) 25 mg tablet, TAKE 1 TABLET BY MOUTH EVERY DAY, Disp: 90 tablet, Rfl: 3  •  metFORMIN (GLUCOPHAGE) 1,000 mg tablet, Take 1 tablet (1,000 mg total) by mouth 2 (two) times a day with meals., Disp: 180 tablet, Rfl: 0  •  metoprolol succinate XL (TOPROL-XL) 25 mg 24 hr tablet, TAKE 1 TABLET BY MOUTH EVERY DAY, Disp: 90 tablet, Rfl: 1  •  omega-3 fatty acids (FISH OIL CONCENTRATE) 1,000 mg capsule, 2 tablet daily, Disp: , Rfl:   •  sertraline (ZOLOFT) 25 mg tablet, Take 1 tablet (25 mg total) by mouth daily., Disp: 90 tablet, Rfl: 3  •  simvastatin (ZOCOR) 40 mg tablet, TAKE 0.5 TABLETS (20 MG TOTAL) BY MOUTH NIGHTLY., Disp: 45 tablet, Rfl: 7      BP Readings from Last 3 Encounters:   06/19/20 138/76   01/31/20 132/90   10/25/18 122/86       Recent Lab results:  Lab Results   Component Value Date    CHOL 150 01/31/2020   ,   Lab Results   Component Value Date    HDL 50 01/31/2020   ,   Lab Results   Component Value Date    LDLCALC 57 01/31/2020   ,   Lab Results   Component Value Date    TRIG 214 (H) 01/31/2020        Lab Results   Component Value Date    GLUCOSE 140 (H) 01/31/2020   ,   Lab Results   Component Value Date    HGBA1C 6.7 (H) 01/31/2020         Lab Results   Component Value Date    CREATININE 1.08 (H) 01/31/2020       Lab Results   Component Value Date     TSH 3.360 07/16/2016

## 2021-06-18 RX ORDER — ALPRAZOLAM 0.25 MG/1
0.25 TABLET ORAL NIGHTLY PRN
Qty: 90 TABLET | Refills: 0 | Status: SHIPPED | OUTPATIENT
Start: 2021-06-18 | End: 2021-09-20 | Stop reason: SDUPTHER

## 2021-06-18 NOTE — TELEPHONE ENCOUNTER
Medicine Refill Request    Last Office Visit: 6/19/2020  Last Telemedicine Visit: Visit date not found    Next Office Visit: Visit date not found  Next Telemedicine Visit: Visit date not found         Current Outpatient Medications:   •  ALPRAZolam (XANAX) 0.25 mg tablet, Take 1 tablet (0.25 mg total) by mouth nightly as needed for anxiety., Disp: 90 tablet, Rfl: 0  •  aspirin 81 mg chewable tablet, 81 mg., Disp: , Rfl:   •  cholecalciferol, vitamin D3, (cholecalciferol) 1,000 unit tablet, take 2 tablets daily, Disp: , Rfl:   •  fosinopriL (MONOPRIL) 40 mg tablet, TAKE 1 TABLET BY MOUTH EVERY DAY, Disp: 90 tablet, Rfl: 3  •  hydrochlorothiazide (HYDRODIURIL) 25 mg tablet, TAKE 1 TABLET BY MOUTH EVERY DAY, Disp: 90 tablet, Rfl: 3  •  metFORMIN (GLUCOPHAGE) 1,000 mg tablet, TAKE 1 TABLET BY MOUTH TWICE A DAY WITH MEALS, Disp: 180 tablet, Rfl: 0  •  metoprolol succinate XL (TOPROL-XL) 25 mg 24 hr tablet, TAKE 1 TABLET BY MOUTH EVERY DAY, Disp: 90 tablet, Rfl: 1  •  omega-3 fatty acids (FISH OIL CONCENTRATE) 1,000 mg capsule, 2 tablet daily, Disp: , Rfl:   •  sertraline (ZOLOFT) 25 mg tablet, Take 1 tablet (25 mg total) by mouth daily., Disp: 90 tablet, Rfl: 3  •  simvastatin (ZOCOR) 40 mg tablet, TAKE 0.5 TABLETS (20 MG TOTAL) BY MOUTH NIGHTLY., Disp: 45 tablet, Rfl: 7      BP Readings from Last 3 Encounters:   06/19/20 138/76   01/31/20 132/90   10/25/18 122/86       Recent Lab results:  Lab Results   Component Value Date    CHOL 150 01/31/2020   ,   Lab Results   Component Value Date    HDL 50 01/31/2020   ,   Lab Results   Component Value Date    LDLCALC 57 01/31/2020   ,   Lab Results   Component Value Date    TRIG 214 (H) 01/31/2020        Lab Results   Component Value Date    GLUCOSE 140 (H) 01/31/2020   ,   Lab Results   Component Value Date    HGBA1C 6.7 (H) 01/31/2020         Lab Results   Component Value Date    CREATININE 1.08 (H) 01/31/2020       Lab Results   Component Value Date    TSH 3.360 07/16/2016

## 2021-07-13 RX ORDER — SIMVASTATIN 40 MG/1
TABLET, FILM COATED ORAL
Qty: 90 TABLET | Refills: 3 | Status: SHIPPED | OUTPATIENT
Start: 2021-07-13 | End: 2022-01-05

## 2021-07-14 RX ORDER — METOPROLOL SUCCINATE 25 MG/1
TABLET, EXTENDED RELEASE ORAL
Qty: 90 TABLET | Refills: 0 | Status: SHIPPED | OUTPATIENT
Start: 2021-07-14 | End: 2021-11-10

## 2021-07-14 RX ORDER — METFORMIN HYDROCHLORIDE 1000 MG/1
TABLET ORAL
Qty: 180 TABLET | Refills: 0 | Status: SHIPPED | OUTPATIENT
Start: 2021-07-14 | End: 2021-11-05

## 2021-07-14 NOTE — TELEPHONE ENCOUNTER
Medicine Refill Request    Last Office Visit: 6/19/2020  Last Telemedicine Visit: Visit date not found    Next Office Visit: Visit date not found  Next Telemedicine Visit: Visit date not found         Current Outpatient Medications:   •  ALPRAZolam (XANAX) 0.25 mg tablet, Take 1 tablet (0.25 mg total) by mouth nightly as needed for anxiety., Disp: 90 tablet, Rfl: 0  •  aspirin 81 mg chewable tablet, 81 mg., Disp: , Rfl:   •  cholecalciferol, vitamin D3, (cholecalciferol) 1,000 unit tablet, take 2 tablets daily, Disp: , Rfl:   •  fosinopriL (MONOPRIL) 40 mg tablet, TAKE 1 TABLET BY MOUTH EVERY DAY, Disp: 90 tablet, Rfl: 3  •  hydrochlorothiazide (HYDRODIURIL) 25 mg tablet, TAKE 1 TABLET BY MOUTH EVERY DAY, Disp: 90 tablet, Rfl: 3  •  metFORMIN (GLUCOPHAGE) 1,000 mg tablet, TAKE 1 TABLET BY MOUTH TWICE A DAY WITH MEALS, Disp: 180 tablet, Rfl: 0  •  metoprolol succinate XL (TOPROL-XL) 25 mg 24 hr tablet, TAKE 1 TABLET BY MOUTH EVERY DAY, Disp: 90 tablet, Rfl: 1  •  omega-3 fatty acids (FISH OIL CONCENTRATE) 1,000 mg capsule, 2 tablet daily, Disp: , Rfl:   •  sertraline (ZOLOFT) 25 mg tablet, Take 1 tablet (25 mg total) by mouth daily., Disp: 90 tablet, Rfl: 3  •  simvastatin (ZOCOR) 40 mg tablet, TAKE 1 TABLET BY MOUTH EVERY DAY AT NIGHT, Disp: 90 tablet, Rfl: 3      BP Readings from Last 3 Encounters:   06/19/20 138/76   01/31/20 132/90   10/25/18 122/86       Recent Lab results:  Lab Results   Component Value Date    CHOL 150 01/31/2020   ,   Lab Results   Component Value Date    HDL 50 01/31/2020   ,   Lab Results   Component Value Date    LDLCALC 57 01/31/2020   ,   Lab Results   Component Value Date    TRIG 214 (H) 01/31/2020        Lab Results   Component Value Date    GLUCOSE 140 (H) 01/31/2020   ,   Lab Results   Component Value Date    HGBA1C 6.7 (H) 01/31/2020         Lab Results   Component Value Date    CREATININE 1.08 (H) 01/31/2020       Lab Results   Component Value Date    TSH 3.360 07/16/2016

## 2021-09-20 RX ORDER — ALPRAZOLAM 0.25 MG/1
0.25 TABLET ORAL NIGHTLY PRN
Qty: 90 TABLET | Refills: 0 | Status: SHIPPED | OUTPATIENT
Start: 2021-09-20 | End: 2021-11-02 | Stop reason: ALTCHOICE

## 2021-09-20 NOTE — TELEPHONE ENCOUNTER
Medicine Refill Request    Last Office Visit: 6/19/2020  Last Telemedicine Visit: Visit date not found    Next Office Visit: 10/5/2021  Next Telemedicine Visit: Visit date not found         Current Outpatient Medications:   •  ALPRAZolam (XANAX) 0.25 mg tablet, Take 1 tablet (0.25 mg total) by mouth nightly as needed for anxiety., Disp: 90 tablet, Rfl: 0  •  aspirin 81 mg chewable tablet, 81 mg., Disp: , Rfl:   •  cholecalciferol, vitamin D3, (cholecalciferol) 1,000 unit tablet, take 2 tablets daily, Disp: , Rfl:   •  fosinopriL (MONOPRIL) 40 mg tablet, TAKE 1 TABLET BY MOUTH EVERY DAY, Disp: 90 tablet, Rfl: 3  •  hydrochlorothiazide (HYDRODIURIL) 25 mg tablet, TAKE 1 TABLET BY MOUTH EVERY DAY, Disp: 90 tablet, Rfl: 3  •  metFORMIN (GLUCOPHAGE) 1,000 mg tablet, TAKE 1 TABLET BY MOUTH TWICE A DAY WITH MEALS, Disp: 180 tablet, Rfl: 0  •  metoprolol succinate XL (TOPROL-XL) 25 mg 24 hr tablet, TAKE 1 TABLET BY MOUTH EVERY DAY, Disp: 90 tablet, Rfl: 0  •  omega-3 fatty acids (FISH OIL CONCENTRATE) 1,000 mg capsule, 2 tablet daily, Disp: , Rfl:   •  sertraline (ZOLOFT) 25 mg tablet, Take 1 tablet (25 mg total) by mouth daily., Disp: 90 tablet, Rfl: 3  •  simvastatin (ZOCOR) 40 mg tablet, TAKE 1 TABLET BY MOUTH EVERY DAY AT NIGHT, Disp: 90 tablet, Rfl: 3      BP Readings from Last 3 Encounters:   06/19/20 138/76   01/31/20 132/90   10/25/18 122/86       Recent Lab results:  Lab Results   Component Value Date    CHOL 150 01/31/2020   ,   Lab Results   Component Value Date    HDL 50 01/31/2020   ,   Lab Results   Component Value Date    LDLCALC 57 01/31/2020   ,   Lab Results   Component Value Date    TRIG 214 (H) 01/31/2020        Lab Results   Component Value Date    GLUCOSE 140 (H) 01/31/2020   ,   Lab Results   Component Value Date    HGBA1C 6.7 (H) 01/31/2020         Lab Results   Component Value Date    CREATININE 1.08 (H) 01/31/2020       Lab Results   Component Value Date    TSH 3.360 07/16/2016

## 2021-10-05 ENCOUNTER — OFFICE VISIT (OUTPATIENT)
Dept: FAMILY MEDICINE | Facility: CLINIC | Age: 71
End: 2021-10-05
Payer: MEDICARE

## 2021-10-05 VITALS
RESPIRATION RATE: 18 BRPM | HEIGHT: 64 IN | BODY MASS INDEX: 23.87 KG/M2 | HEART RATE: 80 BPM | TEMPERATURE: 96.6 F | SYSTOLIC BLOOD PRESSURE: 120 MMHG | WEIGHT: 139.8 LBS | DIASTOLIC BLOOD PRESSURE: 72 MMHG

## 2021-10-05 DIAGNOSIS — F51.04 PSYCHOPHYSIOLOGICAL INSOMNIA: ICD-10-CM

## 2021-10-05 DIAGNOSIS — Z12.31 SCREENING MAMMOGRAM, ENCOUNTER FOR: ICD-10-CM

## 2021-10-05 DIAGNOSIS — E55.9 VITAMIN D DEFICIENCY: ICD-10-CM

## 2021-10-05 DIAGNOSIS — E11.69 TYPE 2 DIABETES MELLITUS WITH OTHER SPECIFIED COMPLICATION, WITHOUT LONG-TERM CURRENT USE OF INSULIN: ICD-10-CM

## 2021-10-05 DIAGNOSIS — Z00.00 MEDICARE ANNUAL WELLNESS VISIT, SUBSEQUENT: Primary | ICD-10-CM

## 2021-10-05 DIAGNOSIS — R94.31 ABNORMAL EKG: ICD-10-CM

## 2021-10-05 DIAGNOSIS — E78.2 MIXED HYPERLIPIDEMIA: ICD-10-CM

## 2021-10-05 DIAGNOSIS — Z23 NEED FOR INFLUENZA VACCINATION: ICD-10-CM

## 2021-10-05 PROBLEM — K62.89 RECTAL PAIN: Status: RESOLVED | Noted: 2020-06-19 | Resolved: 2021-10-05

## 2021-10-05 PROCEDURE — G0439 PPPS, SUBSEQ VISIT: HCPCS | Performed by: FAMILY MEDICINE

## 2021-10-05 PROCEDURE — 93000 ELECTROCARDIOGRAM COMPLETE: CPT | Performed by: FAMILY MEDICINE

## 2021-10-05 PROCEDURE — G0008 ADMIN INFLUENZA VIRUS VAC: HCPCS | Performed by: FAMILY MEDICINE

## 2021-10-05 PROCEDURE — 36415 COLL VENOUS BLD VENIPUNCTURE: CPT | Performed by: FAMILY MEDICINE

## 2021-10-05 PROCEDURE — 90694 VACC AIIV4 NO PRSRV 0.5ML IM: CPT | Performed by: FAMILY MEDICINE

## 2021-10-05 RX ORDER — TRAZODONE HYDROCHLORIDE 50 MG/1
TABLET ORAL
Qty: 30 TABLET | Refills: 1 | Status: SHIPPED | OUTPATIENT
Start: 2021-10-05 | End: 2021-10-26

## 2021-10-05 ASSESSMENT — ENCOUNTER SYMPTOMS
CHILLS: 0
LIGHT-HEADEDNESS: 0
DIZZINESS: 0
POLYDIPSIA: 0
FATIGUE: 0
ABDOMINAL PAIN: 0
BRUISES/BLEEDS EASILY: 0
FREQUENCY: 0
NAUSEA: 0
PALPITATIONS: 0
FEVER: 0
VOMITING: 0
CONSTIPATION: 0
HEADACHES: 0
COUGH: 0
POLYPHAGIA: 0
NECK PAIN: 0
SHORTNESS OF BREATH: 0
DIFFICULTY URINATING: 0
DIARRHEA: 0
NERVOUS/ANXIOUS: 0
ADENOPATHY: 0
BACK PAIN: 0
SLEEP DISTURBANCE: 0

## 2021-10-05 ASSESSMENT — PATIENT HEALTH QUESTIONNAIRE - PHQ9
SUM OF ALL RESPONSES TO PHQ9 QUESTIONS 1 & 2: 2
SUM OF ALL RESPONSES TO PHQ QUESTIONS 1-9: 5

## 2021-10-05 ASSESSMENT — ACTIVITIES OF DAILY LIVING (ADL)
ADEQUATE_TO_COMPLETE_ADL: YES
PATIENT'S MEMORY ADEQUATE TO SAFELY COMPLETE DAILY ACTIVITIES?: YES

## 2021-10-05 NOTE — PATIENT INSTRUCTIONS
Your Personalized Prevention Plan Services (PPPS)    Preventive Services Checklist (Assumes Average Risk Unless Otherwise Noted):    Health Maintenance Topics with due status: Overdue       Topic Date Due    DEXA Scan Never done    Annual Dilated Retinal Exam Never done    Diabetic Foot Exam Never done    Zoster Vaccine Never done    Annual Falls Risk Screening Never done    Medicare Annual Wellness Visit 01/31/2021    Hemoglobin A1C 01/31/2021    Urine Protein Screening 01/31/2021    Influenza Vaccine 08/01/2021     Health Maintenance Topics with due status: Due Soon       Topic Date Due    Colonoscopy 12/02/2021     Health Maintenance Topics with due status: Not Due       Topic Last Completion Date    DTaP, Tdap, and Td Vaccines 03/06/2014    Mammogram 09/16/2020     Health Maintenance Topics with due status: Completed       Topic Last Completion Date    Pneumococcal 06/27/2018    Pneumococcal (65 years and older) 06/27/2018    Hepatitis C Screening 01/31/2020    COVID-19 Vaccine 03/30/2021     Health Maintenance Topics with due status: Aged Out       Topic Date Due    Meningococcal ACWY Aged Out    HIB Vaccines Aged Out    IPV Vaccines Aged Out    HPV Vaccines Aged Out       You May Be Eligible for These Additional Preventive Services   (Assumes Average Risk Unless Otherwise Noted)  Diabetes Screening Any 1 risk factor: hypertension, dyslipidemia, obesity, high glucose; or Any 2 risk factors: >=66yo, overweight, family history diabetes (covered every 6 months)   Hepatitis C Screening Any 1 risk factor: 1) blood transfusion before 1992,   2) current or past injection drug use (annually for high risk; if born between [id number], see above for status).   Vaccine: Hepatitis B As necessary if at-risk: hemophilia, ESRD, diabetes, living with individual infected with hep B, healthcare worker with frequent contact with blood/bodily fluids (series covered once)   Sexually Transmitted Diseases (STDs)  As necessary chlamydia, gonorrhea, syphilis, hepatitis B (covered annually)  HIV if any 1 risk factor present: 1) <14yo or >64yo and at increased risk or 2) 15-64yo and ask for it (covered annually)   Lung Cancer Screening Low dose chest CT if all 3 risk factors: 1) 55-76yo, 2) smoker or quit within last 15y, 3) >=30 pack years (covered annually).  No results found for this or any previous visit.       Cholesterol Screening Both risk factors: 1) >=19yo and 2)  increased risk coronary artery disease (covered every 5 years).     Breast Cancer Screening Covered once 35-40yo, annually >=39yo (if >=49yo, see above for status).     Glaucoma Screening Any 1 risk factor: 1) diabetes, 2) family history glaucoma, 3)  >=49yo, 4)  American >=64yo (covered annually)           Health Risk Factors with Personalized Education:  ----------------------------------------------------------------------------------------------------------------------  Stress management:  Understanding Your Stress  · Think about how you know when you’re stressed.  · Think about how your thoughts or behaviors are different when you’re stressed.  · Think about what triggers stress for you.  · Think about how you handle stress, and whether you’re making unhealthy choices in response to stress (like smoking, drinking alcohol or overeating).  Managing Stress  · Take a break from the stressful situation.  · Reduce your stress levels by exercising, talking with family/friends, ensuring adequate sleep.  · Consider meditation or yoga.  · Make sure you plan time to do things you enjoy.  · Let your PCP know if you’re having problems limiting your stress.  ----------------------------------------------------------------------------------------------------------------------  Controlling Your Blood Pressure  · Maintain a normal weight (body mass index between 18.5 and 24.9).  · Eat more fruit, vegetables and low-fat dairy.  · Eat less saturated  fat and total fat.  · Lower your sodium (salt) intake.  Try to stay under 1500 mg per day, but if you cannot get your intake to be that low, at least lower it by 1000 mg.  · Stay active.  Try to get at least 90 to 150 minutes of exercise per week.  Try brisk walking, swimming, bicycling or dancing.  · Limit alcohol intake.  When you do consume alcohol, drink no more than 1 drink per day.  · If you have been prescribed medication, take it regularly and exactly as prescribed.  Let your PCP know if you have any problems or questions about your medication.  · Check your blood pressure at home or at the store.  Write down your readings and share them with your PCP  ----------------------------------------------------------------------------------------------------------------------  Controlling Your Diabetes  · Stress can raise your blood sugar.  Learn what causes your stress.  Learn to lower your stress by spending time with family and friends, exercising, deep breathing, trying meditation or yoga, enjoying hobbies and getting enough rest.  Let your PCP know if you’re having problems limiting your stress.  · Maintain a healthy weight by balancing your diet and exercise.  · Choose foods that are lower in calories, saturated fat, trans fat, sugar and salt.  Eat foods with more fiber, like whole grain cereals, bread, crackers, rice or pasta.  Choose to eat fruit, vegetables, and low-fat or fat-free/skim dairy.  · Reduce the portion size of your meals.  Make half of your meal vegetables and fruit, and divide the other half between lean protein and whole grains.  · Drink water instead of juice and regular soda.  · Spend at least some time being active on most days of the week.  · Work to increase your muscle strength at least twice per week.  Try things like light weights, stretch bands, yoga, heavy gardening (digging, planting with tools) or push-ups.  · If you have been prescribed medication, take it regularly and exactly  as prescribed.  Let your PCP know if you have any problems or questions about your medication.  · If you have been asked to check your blood sugar, write down your readings and share them with your PCP  ----------------------------------------------------------------------------------------------------------------------  Controlling Your Cholesterol  · Reduce the amount of saturated and trans fat in your diet.  Limit intake of red meat.  Consume only low-fat or non-fat/skim dairy.  Limit fried food.  Cook with vegetable oils.  · Reduce your intake of sugary foods, sugary drinks and alcohol.  · Eat a diet high in fruit, vegetables and whole grains.  · Get protein from fish, poultry and a small portion of nuts.  · Stay active.  Try to get at least 90 to 150 minutes of exercise per week.  Try brisk walking, swimming, bicycling or dancing.  · Maintain a healthy weight by balancing your diet and exercise.  · If you have been prescribed medication, take it regularly and exactly as prescribed. Let your PCP know if you have any problems or questions about your medication.  · It’s important to know your cholesterol numbers.  When recommended by your PCP, get the cholesterol blood test.  ----------------------------------------------------------------------------------------------------------------------  Maintaining Strong Bones  · Try to get at least 90 to 150 minutes of weight-bearing exercise per week.  · Ensure intake of at least 1200mg of calcium per day.  Eat foods high in calcium like milk and other dairy, green vegetables, fruit, canned fish with soft and edible bones, nuts, calcium-set tofu.  Some foods are calcium-fortified, like bread, cereal, fruit juices and mineral water.  · Help your body make vitamin D by getting 10-15 minutes per day of sunlight.    · Ensure intake of at least 600IU of vitamin D per day.  Eat foods high in vitamin D like oily fish (salmon, sardines, mackerel) and eggs.  Some foods are  fortified with vitamin D, like dairy and cereals.  · Avoid high amounts of caffeine and salt, since they can cause the body to loose calcium.  · Limit alcohol intake, since it is associated with weaker bones and is associated with falls and fractures.  · Limit intake of fizzy drinks.  ----------------------------------------------------------------------------------------------------------------------  Reducing Your Risk of Falls  · Tell your PCP if any of your medications make you feel tired, dizzy, lightheaded or off-balance.  · Maintain coordination, flexibility and balance by ensuring regular physical activity.  · Limit alcohol intake to 1 drink per day.  Consider avoiding all alcohol intake.  · Ensure good vision.  Visit an ophthalmologist or optometrist regularly for vision screening or to make sure your glasses / contact lens prescription is correct.  If you need glasses or contacts, wear them.  When you get new glasses or contacts, take time to get used to them.  Do not wear sunglasses or tinted lenses when indoors.  · Ensure good hearing.  Have your hearing checked if you are having trouble hearing, or family and friends think you cannot hear them.  If you need a hearing aid, be sure it fits well and wear it.  · Get enough rest.  Ensure about 7-9 hours of sleep every day.  · Get up slowly from your bed or chairs.  Do not start walking until you are sure you feel steady.  · Wear non-skid, rubber-soled, low-heeled shoes.  Do not walk in socks, or in shoes and slippers with smooth soles.  · If your PCP or therapist recommends using a cane or walker, use it regularly.  · Make your home safer.  Increase lighting throughout the house, especially at the top and bottom of stairs.  Ensure lighting is easily turned on when getting up in the middle of the night.  Make sure there are two secure rails on all stairs.  Install grab bars in the bathtub / shower and near the toilet.  Consider using a shower chair and / or a  hand-held shower.  · Spread sand or salt on icy surfaces.  Beware of wet surfaces, which can be icy.  · Tell your PCP if you have fallen.

## 2021-10-05 NOTE — PROGRESS NOTES
Subjective     Vianney Bone is a 70 y.o. female who presents for a subsequent annual wellness visit.     Patient Care Team:  Mer Mills DO as PCP - General  Tiffanie Murray MD as Obstetrician (Obstetrics and Gynecology)    Comprehensive Medical and Social History  Patient Active Problem List   Diagnosis   • Benign essential hypertension   • Diabetes mellitus (CMS/HCC)   • Mixed hyperlipidemia   • Squamous cell cancer of skin of nose   • Medicare annual wellness visit, subsequent   • Immunization due   • Reactive depression   • Abnormal EKG   • Vitamin D deficiency   • Screening mammogram, encounter for   • Psychophysiological insomnia     Past Medical History:   Diagnosis Date   • Diabetes mellitus (CMS/HCC) 1/3/2011    Overview:    • Hypertension    • Mixed hyperlipidemia 1/3/2011    Overview:    • Squamous cell cancer of skin of nose 6/27/2018     Past Surgical History:   Procedure Laterality Date   • SQUAMOUS CELL CARCINOMA EXCISION       No Known Allergies  Current Outpatient Medications   Medication Sig Dispense Refill   • ALPRAZolam (XANAX) 0.25 mg tablet Take 1 tablet (0.25 mg total) by mouth nightly as needed for anxiety. 90 tablet 0   • aspirin 81 mg chewable tablet 81 mg.     • cholecalciferol, vitamin D3, (cholecalciferol) 1,000 unit tablet take 2 tablets daily     • fosinopriL (MONOPRIL) 40 mg tablet TAKE 1 TABLET BY MOUTH EVERY DAY 90 tablet 3   • hydrochlorothiazide (HYDRODIURIL) 25 mg tablet TAKE 1 TABLET BY MOUTH EVERY DAY 90 tablet 3   • metFORMIN (GLUCOPHAGE) 1,000 mg tablet TAKE 1 TABLET BY MOUTH TWICE A DAY WITH MEALS 180 tablet 0   • metoprolol succinate XL (TOPROL-XL) 25 mg 24 hr tablet TAKE 1 TABLET BY MOUTH EVERY DAY 90 tablet 0   • omega-3 fatty acids (FISH OIL CONCENTRATE) 1,000 mg capsule 2 tablet daily     • simvastatin (ZOCOR) 40 mg tablet TAKE 1 TABLET BY MOUTH EVERY DAY AT NIGHT 90 tablet 3   • traZODone (DESYREL) 50 mg tablet Take 1/2 tablet at bedtime, can increase to 1  "tablet 30 tablet 1     No current facility-administered medications for this visit.     Social History     Tobacco Use   • Smoking status: Never Smoker   • Smokeless tobacco: Never Used   Substance Use Topics   • Alcohol use: No   • Drug use: Not on file     Family History   Problem Relation Age of Onset   • Hypertension Biological Mother    • Heart disease Biological Father    • Stroke Biological Father    • Heart disease Biological Brother    • Ovarian cancer Biological Sister    • Bipolar disorder Biological Sister    • Rheum arthritis Biological Sister    • Aneurysm Biological Son        Objective   Vitals  Vitals:    10/05/21 1016   BP: 120/72   Pulse: 80   Resp: 18   Temp: (!) 35.9 °C (96.6 °F)   Weight: 63.4 kg (139 lb 12.8 oz)   Height: 1.626 m (5' 4\")     Body mass index is 24 kg/m².    Advanced Care Plan  Does patient have advance directive?: Yes       Patient has Advance Directive: Patient has Advance Directive, has not brought in                             PHQ  Will the patient answer the depression questions?: Yes   Little interest or pleasure in doing things: Several days   Feeling down, depressed, or hopeless: Several days   Depression Risk: 2   Trouble falling or staying asleep, or sleeping too much: Several days   Feeling tired or having little energy: Several days   Poor appetite or overeating: Several days   Feeling bad about yourself - or that you are a failure or have let yourself or your family down: Not at all   Trouble concentrating on things, such as reading the newspaper or watching television: Not at all   Moving or speaking so slowly that other people could have noticed? Or the opposite - being so fidgety or restless that you have been moving around a lot more than usual.: Not at all   Thoughts that you would be better off dead or hurting yourself in some way: Not at all   Depression Risk Score: 5   If You Checked Off Any Problems, How Difficult Have These Problems Made It For You to Do " Your Work, Take Care of Things at Home, or Get Along with Other People?: somewhat difficult   PHQ interpretation: PHQ result indicates mild depression     Mini Cog         Get Up and Go  Result: Pass    STEADI Falls Risk  One or more falls in the last year: No           Has trouble stepping up onto a curb: No   Advised to use a cane or walker to get around safely: No   Often has to rush to the toilet: No   Feels unsteady when walking: No   Has lost some feeling in feet: No   Often feels sad or depressed: No   Steadies self on furniture while walking at home: No   Takes medication that makes him/her feel lightheaded or more tired than usual: No   Worried about falling: No   Takes medicine to sleep or improve mood: No   Needs to push with hands when rising from a chair: No   Falls screen completed: Yes     Hearing and Vision Screening  No exam data present  See HRA for relevant hearing screening response.    Assessment/Plan   Diagnoses and all orders for this visit:    Medicare annual wellness visit, subsequent (Primary)  Assessment & Plan:  71 yo female presents for annual wellness visit  Forms completed, reviewed and scanned      Need for influenza vaccination  -     Influenza vaccine Quad Adjuvanted 65 and Older (FluAd Quad)    Mixed hyperlipidemia  Assessment & Plan:  Stable  Check FBW  Continue simvastatin    Orders:  -     Comprehensive metabolic panel  -     Lipid panel  -     ECG 12 LEAD OFFICE PERFORMED    Type 2 diabetes mellitus with other specified complication, without long-term current use of insulin (CMS/[facility])  Assessment & Plan:  Stable  Check HgbA1c  Continue current meds    Orders:  -     CBC and Differential  -     Urinalysis with Reflex Culture (ED and Outpatient only)  -     Hemoglobin A1c  -     Microalbumin/Creatinine Ur Random  -     ECG 12 LEAD OFFICE PERFORMED  -     Ambulatory referral to Cardiology; Future    Vitamin D deficiency  -     Vitamin D 25 hydroxy    Screening mammogram, encounter  for  -     BI SCREENING MAMMOGRAM BILATERAL(TOMOSYNTHESIS); Future    Abnormal EKG  Assessment & Plan:  Changes seem more prominent  Refer to cardiologist    Orders:  -     Ambulatory referral to Cardiology; Future    Psychophysiological insomnia  Assessment & Plan:  Prescribed trazodone 50mg at bedtime        Other orders  -     traZODone (DESYREL) 50 mg tablet; Take 1/2 tablet at bedtime, can increase to 1 tablet      See Patient Instructions (the written plan) which was given to the patient for PPPS and health risk factors with interventions.         Subjective      Patient ID: Vianney Bone is a 70 y.o. female.  1950      Patient presents for annual wellness visit.  She denies any chest pain or SOB. No headaches or dizziness. Her blood sugars range 120-140.      The following have been reviewed and updated as appropriate in this visit:  Tobacco  Allergies  Meds  Problems  Med Hx  Surg Hx  Fam Hx       Review of Systems   Constitutional: Negative for chills, fatigue and fever.   HENT: Negative for congestion.    Eyes: Negative for visual disturbance.   Respiratory: Negative for cough and shortness of breath.    Cardiovascular: Negative for chest pain, palpitations and leg swelling.   Gastrointestinal: Negative for abdominal pain, constipation, diarrhea, nausea and vomiting.   Endocrine: Negative for cold intolerance, heat intolerance, polydipsia, polyphagia and polyuria.   Genitourinary: Negative for difficulty urinating, frequency, pelvic pain and urgency.   Musculoskeletal: Negative for back pain and neck pain.   Skin: Negative for rash.   Allergic/Immunologic: Negative for environmental allergies and food allergies.   Neurological: Negative for dizziness, light-headedness and headaches.   Hematological: Negative for adenopathy. Does not bruise/bleed easily.   Psychiatric/Behavioral: Negative for sleep disturbance. The patient is not nervous/anxious.        Objective     Vitals:    10/05/21 1016  "  BP: 120/72   Pulse: 80   Resp: 18   Temp: (!) 35.9 °C (96.6 °F)   Weight: 63.4 kg (139 lb 12.8 oz)   Height: 1.626 m (5' 4\")     Body mass index is 24 kg/m².    Physical Exam  Vitals reviewed.   Constitutional:       Appearance: Normal appearance. She is well-developed.   HENT:      Right Ear: Tympanic membrane, ear canal and external ear normal.      Left Ear: Tympanic membrane, ear canal and external ear normal.      Nose: Nose normal.      Mouth/Throat:      Mouth: Mucous membranes are moist.      Pharynx: Oropharynx is clear.   Eyes:      Conjunctiva/sclera: Conjunctivae normal.      Pupils: Pupils are equal, round, and reactive to light.   Neck:      Thyroid: No thyromegaly.   Cardiovascular:      Rate and Rhythm: Normal rate and regular rhythm.      Pulses:           Dorsalis pedis pulses are 2+ on the right side and 2+ on the left side.        Posterior tibial pulses are 2+ on the right side and 2+ on the left side.      Heart sounds: Normal heart sounds. No murmur heard.      Pulmonary:      Effort: Pulmonary effort is normal.      Breath sounds: Normal breath sounds. No wheezing.   Abdominal:      General: Bowel sounds are normal. There is no distension.      Palpations: Abdomen is soft. There is no mass.      Tenderness: There is no abdominal tenderness. There is no guarding.   Musculoskeletal:         General: Normal range of motion.      Cervical back: Neck supple.      Right lower leg: No edema.      Left lower leg: No edema.   Lymphadenopathy:      Cervical: No cervical adenopathy.   Skin:     General: Skin is warm and dry.      Findings: No rash.   Neurological:      General: No focal deficit present.      Mental Status: She is alert and oriented to person, place, and time.      Deep Tendon Reflexes: Reflexes are normal and symmetric.   Psychiatric:         Mood and Affect: Mood normal.         Assessment/Plan   Diagnoses and all orders for this visit:    Medicare annual wellness visit, subsequent " (Primary)  Assessment & Plan:  69 yo female presents for annual wellness visit  Forms completed, reviewed and scanned      Need for influenza vaccination  -     Influenza vaccine Quad Adjuvanted 65 and Older (FluAd Quad)    Mixed hyperlipidemia  Assessment & Plan:  Stable  Check FBW  Continue simvastatin    Orders:  -     Comprehensive metabolic panel  -     Lipid panel  -     ECG 12 LEAD OFFICE PERFORMED    Type 2 diabetes mellitus with other specified complication, without long-term current use of insulin (CMS/[facility])  Assessment & Plan:  Stable  Check HgbA1c  Continue current meds    Orders:  -     CBC and Differential  -     Urinalysis with Reflex Culture (ED and Outpatient only)  -     Hemoglobin A1c  -     Microalbumin/Creatinine Ur Random  -     ECG 12 LEAD OFFICE PERFORMED  -     Ambulatory referral to Cardiology; Future    Vitamin D deficiency  -     Vitamin D 25 hydroxy    Screening mammogram, encounter for  -     BI SCREENING MAMMOGRAM BILATERAL(TOMOSYNTHESIS); Future    Abnormal EKG  Assessment & Plan:  Changes seem more prominent  Refer to cardiologist    Orders:  -     Ambulatory referral to Cardiology; Future    Psychophysiological insomnia  Assessment & Plan:  Prescribed trazodone 50mg at bedtime        Other orders  -     traZODone (DESYREL) 50 mg tablet; Take 1/2 tablet at bedtime, can increase to 1 tablet

## 2021-10-06 LAB
25(OH)D3+25(OH)D2 SERPL-MCNC: 21.9 NG/ML (ref 30–100)
ALBUMIN SERPL-MCNC: 4.3 G/DL (ref 3.8–4.8)
ALBUMIN/CREAT UR: 12 MG/G CREAT (ref 0–29)
ALBUMIN/GLOB SERPL: 1.7 {RATIO} (ref 1.2–2.2)
ALP SERPL-CCNC: 63 IU/L (ref 44–121)
ALT SERPL-CCNC: 19 IU/L (ref 0–32)
AST SERPL-CCNC: 20 IU/L (ref 0–40)
BASOPHILS # BLD AUTO: 0.1 X10E3/UL (ref 0–0.2)
BASOPHILS NFR BLD AUTO: 1 %
BILIRUB SERPL-MCNC: 0.4 MG/DL (ref 0–1.2)
BUN SERPL-MCNC: 23 MG/DL (ref 8–27)
BUN/CREAT SERPL: 16 (ref 12–28)
CALCIUM SERPL-MCNC: 9.9 MG/DL (ref 8.7–10.3)
CHLORIDE SERPL-SCNC: 101 MMOL/L (ref 96–106)
CHOLEST SERPL-MCNC: 131 MG/DL (ref 100–199)
CO2 SERPL-SCNC: 21 MMOL/L (ref 20–29)
CREAT SERPL-MCNC: 1.42 MG/DL (ref 0.57–1)
CREAT UR-MCNC: 111.2 MG/DL
EOSINOPHIL # BLD AUTO: 0.9 X10E3/UL (ref 0–0.4)
EOSINOPHIL NFR BLD AUTO: 13 %
ERYTHROCYTE [DISTWIDTH] IN BLOOD BY AUTOMATED COUNT: 12.7 % (ref 11.7–15.4)
GLOBULIN SER CALC-MCNC: 2.5 G/DL (ref 1.5–4.5)
GLUCOSE SERPL-MCNC: 145 MG/DL (ref 65–99)
HBA1C MFR BLD: 7.1 % (ref 4.8–5.6)
HCT VFR BLD AUTO: 36.1 % (ref 34–46.6)
HDLC SERPL-MCNC: 41 MG/DL
HGB BLD-MCNC: 11.7 G/DL (ref 11.1–15.9)
IMM GRANULOCYTES # BLD AUTO: 0 X10E3/UL (ref 0–0.1)
IMM GRANULOCYTES NFR BLD AUTO: 0 %
LAB CORP EGFR IF AFRICN AM: 43 ML/MIN/1.73
LAB CORP EGFR IF NONAFRICN AM: 38 ML/MIN/1.73
LDLC SERPL CALC-MCNC: 51 MG/DL (ref 0–99)
LYMPHOCYTES # BLD AUTO: 1.3 X10E3/UL (ref 0.7–3.1)
LYMPHOCYTES NFR BLD AUTO: 19 %
MCH RBC QN AUTO: 28 PG (ref 26.6–33)
MCHC RBC AUTO-ENTMCNC: 32.4 G/DL (ref 31.5–35.7)
MCV RBC AUTO: 86 FL (ref 79–97)
MICROALBUMIN UR-MCNC: 13.5 UG/ML
MONOCYTES # BLD AUTO: 0.5 X10E3/UL (ref 0.1–0.9)
MONOCYTES NFR BLD AUTO: 8 %
NEUTROPHILS # BLD AUTO: 4.1 X10E3/UL (ref 1.4–7)
NEUTROPHILS NFR BLD AUTO: 59 %
PLATELET # BLD AUTO: 263 X10E3/UL (ref 150–450)
POTASSIUM SERPL-SCNC: 4.4 MMOL/L (ref 3.5–5.2)
PROT SERPL-MCNC: 6.8 G/DL (ref 6–8.5)
RBC # BLD AUTO: 4.18 X10E6/UL (ref 3.77–5.28)
SODIUM SERPL-SCNC: 138 MMOL/L (ref 134–144)
TRIGL SERPL-MCNC: 251 MG/DL (ref 0–149)
VLDLC SERPL CALC-MCNC: 39 MG/DL (ref 5–40)
WBC # BLD AUTO: 6.9 X10E3/UL (ref 3.4–10.8)

## 2021-10-12 ENCOUNTER — DOCUMENTATION (OUTPATIENT)
Dept: FAMILY MEDICINE | Facility: CLINIC | Age: 71
End: 2021-10-12

## 2021-10-12 NOTE — PROGRESS NOTES
Coding Clarification ([facility]) - [facility]  Note       DATE: 10/12/2021    RE: Vianney Bone  : 1950      Under the direction of Nichol Childs DO, the following adjustments were made to this patients Problem List:      Specified Code: E11.69 Code Description: Type 2 diabetes mellitus with other specified complication   Clarification Type EMR System Encounter Type Date of Service Provider   Code Specificity Epic  Progress Notes 2020  NICHOL CHILDS   Rationale: Claims data indicate Dr NICHOL CHILDS,E11.69 In association with clinical care rendered on DOS 2020. No Clinical data available for review by .Higher specificity for E11.9 from problem list is available to be E11.69       Original Code: E11.9

## 2021-10-26 ENCOUNTER — OFFICE VISIT (OUTPATIENT)
Dept: CARDIOLOGY | Facility: CLINIC | Age: 71
End: 2021-10-26
Payer: MEDICARE

## 2021-10-26 VITALS
HEART RATE: 77 BPM | WEIGHT: 139.2 LBS | SYSTOLIC BLOOD PRESSURE: 175 MMHG | BODY MASS INDEX: 23.76 KG/M2 | HEIGHT: 64 IN | OXYGEN SATURATION: 95 % | DIASTOLIC BLOOD PRESSURE: 83 MMHG

## 2021-10-26 DIAGNOSIS — R94.31 ABNORMAL EKG: Primary | ICD-10-CM

## 2021-10-26 DIAGNOSIS — N18.31 STAGE 3A CHRONIC KIDNEY DISEASE (CMS/HCC): ICD-10-CM

## 2021-10-26 DIAGNOSIS — Z87.59 HISTORY OF PRE-ECLAMPSIA: ICD-10-CM

## 2021-10-26 DIAGNOSIS — E78.2 MIXED HYPERLIPIDEMIA: ICD-10-CM

## 2021-10-26 DIAGNOSIS — I10 BENIGN ESSENTIAL HYPERTENSION: ICD-10-CM

## 2021-10-26 DIAGNOSIS — E11.69 TYPE 2 DIABETES MELLITUS WITH OTHER SPECIFIED COMPLICATION, WITHOUT LONG-TERM CURRENT USE OF INSULIN: ICD-10-CM

## 2021-10-26 PROBLEM — N18.30 STAGE 3 CHRONIC KIDNEY DISEASE (CMS/HCC): Status: ACTIVE | Noted: 2021-10-26

## 2021-10-26 PROBLEM — N18.9 CKD (CHRONIC KIDNEY DISEASE): Status: ACTIVE | Noted: 2021-10-26

## 2021-10-26 PROCEDURE — G8753 SYS BP > OR = 140: HCPCS | Performed by: STUDENT IN AN ORGANIZED HEALTH CARE EDUCATION/TRAINING PROGRAM

## 2021-10-26 PROCEDURE — 99204 OFFICE O/P NEW MOD 45 MIN: CPT | Performed by: STUDENT IN AN ORGANIZED HEALTH CARE EDUCATION/TRAINING PROGRAM

## 2021-10-26 PROCEDURE — G8754 DIAS BP LESS 90: HCPCS | Performed by: STUDENT IN AN ORGANIZED HEALTH CARE EDUCATION/TRAINING PROGRAM

## 2021-10-26 ASSESSMENT — ENCOUNTER SYMPTOMS
SHORTNESS OF BREATH: 0
COUGH: 0
DYSURIA: 0
SORE THROAT: 0
LIGHT-HEADEDNESS: 0
NERVOUS/ANXIOUS: 1
ADENOPATHY: 0
ALTERED MENTAL STATUS: 0
RIGHT EYE: 0
MUSCLE CRAMPS: 0
HEMATURIA: 0
FEVER: 0
DIZZINESS: 0
LEFT EYE: 0
DYSPNEA ON EXERTION: 0
CHILLS: 0
VOMITING: 0
NAUSEA: 0

## 2021-10-26 NOTE — PATIENT INSTRUCTIONS
Check your blood pressure 2-3 times weekly    Call me if you keep getting readings over >140/90 mmHg      Check echocardiogram

## 2021-10-26 NOTE — PROGRESS NOTES
Minnie Renee,   Cardiology    [facility]  [facility] at [address] [address]    [phone]    [facility]     10/26/2021     Reason for visit: Cardiovascular consultation    Vianney Bone is a 70 y.o. female who presents for cardiovascular consultation.  History obtained from patient and by extensive review of available medical records.  Vianney has a history of hypertension, hyperlipidemia, and diabetes mellitus type 2.    Vianney presents today to establish care and for history of abnormal EKG.  She feels well.  She denies chest discomfort, shortness of breath, PND, lower extremity edema, presyncope, syncope.  She tells me that she has had palpitations since the age of 15 that occur randomly every couple months.    She does not formally exercise but does note that she walks and plays with her grandchildren.    In terms of her abnormal ECG, this has been longstanding, however, at a recent primary care doctor visit her ECG changes were noted to be slightly more prominent.    Vianney notes that she has been through a lot in the past year as her  was diagnosed with ALS and later passed away.  She also found her 42-year-old son  from a brain hemorrhage. She has been having a lot of trouble sleeping and has been trying different sleep aids.  She tells me that her support system includes her 2 daughters and that she initially tried therapy, but decided it was not for her.    She notes that she is anxious today in the office.  She does not regularly check her blood pressure at home.  She took her lisinopril this morning around 7 AM.  Her metoprolol and hydrochlorothiazide she notes she takes in the evening.    Past Medical History:  1. Hypertension  2. Preeclampsia, term  3. Diabetes mellitus, type II  4. Hyperlipidemia  5. CKD  6. Insomnia   7. Squamous cell carcinoma  8. Obstetrics history     Past Surgical History:  1. Moh's  surgery    Medications: Fosinopril 40 mg daily, hydrochlorothiazide 25 mg daily, metoprolol succinate 25 mg daily, simvastatin 40 mg daily, Metformin 1000 mg twice daily, vitamin D3 2000 units daily, fish oil 2000 units daily, alprazolam as needed    Allergies: Patient has no known allergies.    Social History: Retired. Worked at [employer] for 28 years.  .  Never smoker.  Denies alcohol use.  Denies illicit drug use.    Family History: Reviewed and significant for father with coronary artery disease with bypass surgery and multiple strokes.  Brother with coronary artery disease and bypass surgery at the age of 65.  Almost all of her 8 siblings of hypertension.  Son with suspected brain aneurysm.    Review of Systems   Constitutional: Negative for chills and fever.   HENT: Negative for congestion and sore throat.    Eyes: Negative for vision loss in left eye and vision loss in right eye.   Cardiovascular: Negative for chest pain and dyspnea on exertion.   Respiratory: Negative for cough and shortness of breath.    Endocrine: Negative for cold intolerance and heat intolerance.   Hematologic/Lymphatic: Negative for adenopathy and bleeding problem.   Skin: Negative for flushing and itching.   Musculoskeletal: Negative for muscle cramps and muscle weakness.   Gastrointestinal: Negative for nausea and vomiting.   Genitourinary: Negative for dysuria and hematuria.   Neurological: Negative for dizziness and light-headedness.   Psychiatric/Behavioral: Negative for altered mental status. The patient is nervous/anxious.    Allergic/Immunologic: Negative for environmental allergies.       Exam:  Objective   Vitals:    10/26/21 1040   BP: (!) 175/83   Pulse: 77   SpO2: 95%     Body mass index is 23.89 kg/m².  Physical Exam  Constitutional:       Appearance: Normal appearance.   HENT:      Head: Normocephalic and atraumatic.   Eyes:      General: No scleral icterus.  Neck:      Vascular: No JVD.   Cardiovascular:      Rate and  Rhythm: Normal rate and regular rhythm.      Heart sounds: No murmur heard.      Pulmonary:      Effort: Pulmonary effort is normal. No respiratory distress.      Breath sounds: Normal breath sounds. No wheezing, rhonchi or rales.   Abdominal:      General: There is no distension.      Palpations: Abdomen is soft.   Musculoskeletal:      Right lower leg: No edema.      Left lower leg: No edema.   Skin:     General: Skin is warm and dry.   Neurological:      Mental Status: She is alert and oriented to person, place, and time.   Psychiatric:         Mood and Affect: Mood normal.         Behavior: Behavior normal.         Labs:  Lab Results   Component Value Date    WBC 6.9 10/05/2021    HGB 11.7 10/05/2021     10/05/2021    CHOL 131 10/05/2021    TRIG 251 (H) 10/05/2021    HDL 41 10/05/2021    ALT 19 10/05/2021    AST 20 10/05/2021     10/05/2021    K 4.4 10/05/2021    CREATININE 1.42 (H) 10/05/2021    TSH 3.360 07/16/2016    HGBA1C 7.1 (H) 10/05/2021    MICROALBUR 13.5 10/05/2021   10/5/2021 lipid panel: Total cholesterol 131 triglycerides 251 HDL 41 LDL 51  Personally reviewed, my interpretation: Hypertriglyceridemia, CKD, normal renal function    Cardiovascular Studies:   1. Stress echo 4/19/2012: Patient exercised for 7 minutes and 33 seconds.  No chest pain.  Stress EKG showed exaggeration of baseline ST depressions and new T wave inversions.  Stress echo showed no ischemia.  Resting echo showed normal left ventricular size and wall thickness.  LVEF was 65%.  Aortic valve trileaflet.  Trace mitral regurgitation and tricuspid regurgitation.  PA pressure 60 mmHg plus right atrial pressure.    ECG from 10/5/2021 personally reviewed and discussed with the patient shows sinus rhythm with abnormal ST segments laterally    Assessment/Plan   Problem List Items Addressed This Visit        Circulatory    Benign essential hypertension     Blood pressure elevated in office today measuring 175/83 mmHg.  Of note 2  weeks ago at primary care physician office normal measuring 120/72 mmHg.  She notes that she is very anxious today.  --Given blood pressure was normal in primary care doctor's office earlier this month, recommend continuation of lisinopril 40 mg daily, hydrochlorothiazide 25 mg daily for now  --have asked Vianney to monitor her blood pressures 2-3 times weekly at home.  She will call me if her blood pressure is persistently greater than 140/90 mmHg and at that point we will have to adjust her medications            Genitourinary    Stage 3 chronic kidney disease (CMS/HCC)     10/5/2021 Cr 1.42  --Consider repeat BMP at next follow-up visit to ensure stability.             Endocrine/Metabolic    Type 2 diabetes mellitus with other specified complication (CMS/[facility])     10/5/2021 A1C 7.1%  --She is on Metformin 1000 mg twice daily and is managed by her primary care doctor  --Continue simvastatin 40 mg daily         Mixed hyperlipidemia     10/5/2021 lipid panel: Total cholesterol 131 triglycerides 251 HDL 41 LDL 51  --Continue simvastatin 40 mg daily, consider switching to high intensity statin at next visit            Other    Abnormal EKG - Primary     ECG with abnormal ST segments laterally, similar to prior although more pronounced more recently on ECG from 10/5  Asymptomatic  --Given she has no cardiac symptoms would not recommend pursuing stress testing at this time.  --Recommend checking routine echocardiogram to rule out any structural causes         Relevant Orders    Transthoracic echo (TTE) complete    History of pre-eclampsia     Preeclampsia is a sex-specific cardiovascular risk factor and is associated with premature cardiovascular disease (CVD) including stroke, myocardial infarction, and heart failure with preserved ejection fraction.  --Reviewed that women with a history of adverse pregnancy outcome (APO), including hypertensive disorders of pregnancy (preeclampsia and gestational hypertension),  gestational diabetes,  birth, and small for gestational age infant are at increased risk of future cardiovascular events and risk factor development.   --Plan for aggressive lifelong risk factor optimization: Lifestyle modification discussed, including the importance of heart healthy diet and regular aerobic exercise. Ensure adequate blood pressure control with goal less than 130/80 mmHg. Plan for annual fasting glucose and lipid monitoring with target LDL less than 100.                 Minnie Renee, DO    This letter was generated using speech recognition software. Please excuse any typographical errors.    Return in about 2 months (around 2021).

## 2021-10-26 NOTE — ASSESSMENT & PLAN NOTE
10/5/2021 lipid panel: Total cholesterol 131 triglycerides 251 HDL 41 LDL 51  --Continue simvastatin 40 mg daily, consider switching to high intensity statin at next visit

## 2021-10-26 NOTE — ASSESSMENT & PLAN NOTE
ECG with abnormal ST segments laterally, similar to prior although more pronounced more recently on ECG from 10/5  Asymptomatic  --Given she has no cardiac symptoms would not recommend pursuing stress testing at this time.  --Recommend checking routine echocardiogram to rule out any structural causes

## 2021-10-26 NOTE — ASSESSMENT & PLAN NOTE
Preeclampsia is a sex-specific cardiovascular risk factor and is associated with premature cardiovascular disease (CVD) including stroke, myocardial infarction, and heart failure with preserved ejection fraction.  --Reviewed that women with a history of adverse pregnancy outcome (APO), including hypertensive disorders of pregnancy (preeclampsia and gestational hypertension), gestational diabetes,  birth, and small for gestational age infant are at increased risk of future cardiovascular events and risk factor development.   --Plan for aggressive lifelong risk factor optimization: Lifestyle modification discussed, including the importance of heart healthy diet and regular aerobic exercise. Ensure adequate blood pressure control with goal less than 130/80 mmHg. Plan for annual fasting glucose and lipid monitoring with target LDL less than 100.

## 2021-10-26 NOTE — ASSESSMENT & PLAN NOTE
10/5/2021 A1C 7.1%  --She is on Metformin 1000 mg twice daily and is managed by her primary care doctor  --Continue simvastatin 40 mg daily

## 2021-10-26 NOTE — ASSESSMENT & PLAN NOTE
Blood pressure elevated in office today measuring 175/83 mmHg.  Of note 2 weeks ago at primary care physician office normal measuring 120/72 mmHg.  She notes that she is very anxious today.  --Given blood pressure was normal in primary care doctor's office earlier this month, recommend continuation of lisinopril 40 mg daily, hydrochlorothiazide 25 mg daily for now  --have asked Vianney to monitor her blood pressures 2-3 times weekly at home.  She will call me if her blood pressure is persistently greater than 140/90 mmHg and at that point we will have to adjust her medications

## 2021-10-27 ENCOUNTER — HOSPITAL ENCOUNTER (OUTPATIENT)
Dept: RADIOLOGY | Facility: HOSPITAL | Age: 71
Discharge: HOME | End: 2021-10-27
Attending: FAMILY MEDICINE
Payer: MEDICARE

## 2021-10-27 DIAGNOSIS — Z12.31 SCREENING MAMMOGRAM, ENCOUNTER FOR: ICD-10-CM

## 2021-10-27 PROCEDURE — 77063 BREAST TOMOSYNTHESIS BI: CPT

## 2021-10-28 ENCOUNTER — TELEPHONE (OUTPATIENT)
Dept: FAMILY MEDICINE | Facility: CLINIC | Age: 71
End: 2021-10-28

## 2021-10-28 DIAGNOSIS — R79.89 ELEVATED SERUM CREATININE: Primary | ICD-10-CM

## 2021-11-02 LAB — MLHC DIABETIC EYE EXAM (EXTERNAL): NORMAL

## 2021-11-02 RX ORDER — CLONAZEPAM 0.5 MG/1
0.5 TABLET ORAL NIGHTLY PRN
Qty: 30 TABLET | Refills: 0 | Status: SHIPPED | OUTPATIENT
Start: 2021-11-02 | End: 2022-01-10 | Stop reason: ALTCHOICE

## 2021-11-02 NOTE — RESULT ENCOUNTER NOTE
Spoke with patient regarding test results.  Creatinine is up to 1.42 so she needs to be sure she is staying hydrated.  We will repeat a BMP next week.  Her A1c is 7.1.  Her total cholesterol and LDL are at goal.  Triglycerides are elevated at 251 so she needs to watch the carbs and sugars in her diet.  Vitamin D is low at 21.9 so she should start vitamin D supplement again.  There is no microalbumin in her urine.  Liver functions are normal.  I will send a prescription over for Klonopin for her to try at night for sleep.

## 2021-11-05 RX ORDER — METFORMIN HYDROCHLORIDE 1000 MG/1
TABLET ORAL
Qty: 180 TABLET | Refills: 0 | Status: SHIPPED | OUTPATIENT
Start: 2021-11-05 | End: 2021-12-07

## 2021-11-09 ENCOUNTER — APPOINTMENT (OUTPATIENT)
Dept: LAB | Age: 71
End: 2021-11-09
Attending: FAMILY MEDICINE
Payer: MEDICARE

## 2021-11-09 ENCOUNTER — HOSPITAL ENCOUNTER (OUTPATIENT)
Dept: CARDIOLOGY | Facility: CLINIC | Age: 71
Discharge: HOME | End: 2021-11-09
Payer: MEDICARE

## 2021-11-09 VITALS
BODY MASS INDEX: 23.73 KG/M2 | WEIGHT: 139 LBS | DIASTOLIC BLOOD PRESSURE: 83 MMHG | SYSTOLIC BLOOD PRESSURE: 175 MMHG | HEIGHT: 64 IN

## 2021-11-09 DIAGNOSIS — R79.89 ELEVATED SERUM CREATININE: ICD-10-CM

## 2021-11-09 DIAGNOSIS — R94.31 ABNORMAL EKG: ICD-10-CM

## 2021-11-09 LAB
ANION GAP SERPL CALC-SCNC: 15 MEQ/L (ref 3–15)
AORTIC ROOT ANNULUS: 2.8 CM
ASCENDING AORTA: 3.3 CM
AV PEAK GRADIENT: 5 MMHG
AV PEAK VELOCITY-S: 1.1 M/S
AV VALVE AREA: 2.29 CM2
BSA FOR ECHO PROCEDURE: 1.69 M2
BUN SERPL-MCNC: 18 MG/DL (ref 8–20)
CALCIUM SERPL-MCNC: 9.7 MG/DL (ref 8.9–10.3)
CHLORIDE SERPL-SCNC: 97 MEQ/L (ref 98–109)
CO2 SERPL-SCNC: 26 MEQ/L (ref 22–32)
CREAT SERPL-MCNC: 1.4 MG/DL (ref 0.6–1.1)
CUSP SEPARATION: 2.3 CM
E WAVE DECELERATION TIME: 246 MS
E/A RATIO: 0.7
E/E' RATIO: 10.7
E/LAT E' RATIO: 9.9
EDV (BP): 41.2 CM3
EF (A4C): 63.9 %
EF A2C: 59.7 %
EJECTION FRACTION: 63.1 %
EST RIGHT VENT SYSTOLIC PRESSURE BY TRICUSPID REGURGITATION JET: 27 MMHG
ESTIMATED PASP: 42 MMHG
ESV (BP): 15.2 CM3
FRACTIONAL SHORTENING: 33 %
GFR SERPL CREATININE-BSD FRML MDRD: 37.3 ML/MIN/1.73M*2
GLUCOSE SERPL-MCNC: 151 MG/DL (ref 70–99)
INTERVENTRICULAR SEPTUM: 1.2 CM
LA ESV (BP): 39.5 CM3
LA ESV INDEX (A2C): 21.07 CM3/M2
LA ESV INDEX (BP): 23.37 CM3/M2
LA/AORTA RATIO: 1.32
LAAS-AP2: 14.7 CM2
LAAS-AP4: 16.7 CM2
LAD 2D: 3.7 CM
LALD A4C: 4.95 CM
LALD A4C: 5.09 CM
LAV-S: 35.6 CM3
LEFT ATRIUM VOLUME INDEX: 25.44 CM3/M2
LEFT ATRIUM VOLUME: 43 CM3
LEFT INTERNAL DIMENSION IN SYSTOLE: 2.52 CM (ref 2.36–3.57)
LEFT VENTRICLE DIASTOLIC VOLUME INDEX: 25.98 CM3/M2
LEFT VENTRICLE DIASTOLIC VOLUME: 43.9 CM3
LEFT VENTRICLE SYSTOLIC VOLUME INDEX: 9.35 CM3/M2
LEFT VENTRICLE SYSTOLIC VOLUME: 15.8 CM3
LEFT VENTRICULAR INTERNAL DIMENSION IN DIASTOLE: 3.76 CM (ref 3.98–5.52)
LEFT VENTRICULAR POSTERIOR WALL IN END DIASTOLE: 1.18 CM (ref 0.52–0.96)
LV DIASTOLIC VOLUME: 35.5 CM3
LV ESV (APICAL 2 CHAMBER): 14.3 CM3
LVAD-AP2: 15.8 CM2
LVAD-AP4: 18.8 CM2
LVAS-AP2: 9.42 CM2
LVAS-AP4: 9.93 CM2
LVEDVI(A2C): 21.01 CM3/M2
LVEDVI(BP): 24.38 CM3/M2
LVESVI(A2C): 8.46 CM3/M2
LVESVI(BP): 8.99 CM3/M2
LVLD-AP2: 5.88 CM
LVLD-AP4: 6.48 CM
LVLS-AP2: 5.51 CM
LVLS-AP4: 5.3 CM
LVOT 2D: 1.9 CM
LVOT A: 2.84 CM2
LVOT PEAK VELOCITY: 0.89 M/S
MV E'TISSUE VEL-LAT: 0.07 M/S
MV E'TISSUE VEL-MED: 0.06 M/S
MV PEAK A VEL: 1.06 M/S
MV PEAK E VEL: 0.69 M/S
MV VALVE AREA P 1/2 METHOD: 3.06 CM2
POSTERIOR WALL: 1.1 CM
POTASSIUM SERPL-SCNC: 4 MEQ/L (ref 3.6–5.1)
PULMONARY REGURGITATION LATE DIASTOLIC VELOCITY: 1.07 M/S
PV PEAK GRADIENT: 5 MMHG
PV PV: 1.1 M/S
RAP: 5 MMHG
RVOT VMAX: 0.8 M/S
SEPTAL TISSUE DOPPLER FREE WALL LATE DIA VELOCITY (APICAL 4 CHAMBER VIEW): 0.17 M/S
SODIUM SERPL-SCNC: 138 MEQ/L (ref 136–144)
TR MAX PG: 37 MMHG
TRICUSPID VALVE PEAK REGURGITATION VELOCITY: 3.03 M/S
Z-SCORE OF LEFT VENTRICULAR DIMENSION IN END DIASTOLE: -2.2
Z-SCORE OF LEFT VENTRICULAR DIMENSION IN END SYSTOLE: -1.12
Z-SCORE OF LEFT VENTRICULAR POSTERIOR WALL IN END DIASTOLE: 2.67

## 2021-11-09 PROCEDURE — 80048 BASIC METABOLIC PNL TOTAL CA: CPT

## 2021-11-09 PROCEDURE — 93306 TTE W/DOPPLER COMPLETE: CPT | Performed by: STUDENT IN AN ORGANIZED HEALTH CARE EDUCATION/TRAINING PROGRAM

## 2021-11-09 PROCEDURE — 36415 COLL VENOUS BLD VENIPUNCTURE: CPT

## 2021-11-10 ENCOUNTER — TELEPHONE (OUTPATIENT)
Dept: CARDIOLOGY | Facility: CLINIC | Age: 71
End: 2021-11-10

## 2021-11-10 RX ORDER — METOPROLOL SUCCINATE 25 MG/1
TABLET, EXTENDED RELEASE ORAL
Qty: 90 TABLET | Refills: 0 | Status: SHIPPED | OUTPATIENT
Start: 2021-11-10 | End: 2021-12-08 | Stop reason: SDUPTHER

## 2021-11-10 NOTE — TELEPHONE ENCOUNTER
Medicine Refill Request    Last Office Visit: 10/5/2021  Last Telemedicine Visit: Visit date not found    Next Office Visit: Visit date not found  Next Telemedicine Visit: Visit date not found         Current Outpatient Medications:   •  cholecalciferol, vitamin D3, (cholecalciferol) 1,000 unit tablet, take 2 tablets daily, Disp: , Rfl:   •  clonazePAM (KlonoPIN) 0.5 mg tablet, Take 1 tablet (0.5 mg total) by mouth nightly as needed for anxiety., Disp: 30 tablet, Rfl: 0  •  fosinopriL (MONOPRIL) 40 mg tablet, TAKE 1 TABLET BY MOUTH EVERY DAY, Disp: 90 tablet, Rfl: 3  •  hydrochlorothiazide (HYDRODIURIL) 25 mg tablet, TAKE 1 TABLET BY MOUTH EVERY DAY, Disp: 90 tablet, Rfl: 3  •  metFORMIN 1,000 mg tablet, TAKE 1 TABLET BY MOUTH TWICE A DAY WITH MEALS, Disp: 180 tablet, Rfl: 0  •  metoprolol succinate XL (TOPROL-XL) 25 mg 24 hr tablet, TAKE 1 TABLET BY MOUTH EVERY DAY, Disp: 90 tablet, Rfl: 0  •  omega-3 fatty acids (FISH OIL CONCENTRATE) 1,000 mg capsule, 2 tablet daily, Disp: , Rfl:   •  simvastatin (ZOCOR) 40 mg tablet, TAKE 1 TABLET BY MOUTH EVERY DAY AT NIGHT, Disp: 90 tablet, Rfl: 3      BP Readings from Last 3 Encounters:   11/09/21 (!) 175/83   10/26/21 (!) 175/83   10/05/21 120/72       Recent Lab results:  Lab Results   Component Value Date    CHOL 131 10/05/2021   ,   Lab Results   Component Value Date    HDL 41 10/05/2021   ,   Lab Results   Component Value Date    LDLCALC 51 10/05/2021   ,   Lab Results   Component Value Date    TRIG 251 (H) 10/05/2021        Lab Results   Component Value Date    GLUCOSE 151 (H) 11/09/2021   ,   Lab Results   Component Value Date    HGBA1C 7.1 (H) 10/05/2021         Lab Results   Component Value Date    CREATININE 1.4 (H) 11/09/2021       Lab Results   Component Value Date    TSH 3.360 07/16/2016

## 2021-11-17 NOTE — RESULT ENCOUNTER NOTE
Tommy Faith,  I received the note from the specialist (cardiologist). The creatinine is still above normal at 1.4. I would like to monitor this.  Let's repeat it again in 4-6 weeks.Lanny

## 2021-12-07 RX ORDER — METFORMIN HYDROCHLORIDE 1000 MG/1
TABLET ORAL
Qty: 180 TABLET | Refills: 0 | Status: SHIPPED | OUTPATIENT
Start: 2021-12-07 | End: 2022-04-06

## 2021-12-08 RX ORDER — METOPROLOL SUCCINATE 25 MG/1
25 TABLET, EXTENDED RELEASE ORAL
Qty: 90 TABLET | Refills: 3 | Status: SHIPPED | OUTPATIENT
Start: 2021-12-08 | End: 2023-01-16 | Stop reason: SDUPTHER

## 2021-12-08 NOTE — TELEPHONE ENCOUNTER
Medicine Refill Request    Last Office Visit: 10/5/2021  Last Telemedicine Visit: Visit date not found    Next Office Visit: Visit date not found  Next Telemedicine Visit: Visit date not found         Current Outpatient Medications:   •  cholecalciferol, vitamin D3, (cholecalciferol) 1,000 unit tablet, take 2 tablets daily, Disp: , Rfl:   •  clonazePAM (KlonoPIN) 0.5 mg tablet, Take 1 tablet (0.5 mg total) by mouth nightly as needed for anxiety., Disp: 30 tablet, Rfl: 0  •  fosinopriL (MONOPRIL) 40 mg tablet, TAKE 1 TABLET BY MOUTH EVERY DAY, Disp: 90 tablet, Rfl: 3  •  hydrochlorothiazide (HYDRODIURIL) 25 mg tablet, TAKE 1 TABLET BY MOUTH EVERY DAY, Disp: 90 tablet, Rfl: 3  •  metFORMIN 1,000 mg tablet, TAKE 1 TABLET BY MOUTH TWICE A DAY WITH MEALS, Disp: 180 tablet, Rfl: 0  •  metoprolol succinate XL 25 mg 24 hr tablet, TAKE 1 TABLET BY MOUTH EVERY DAY, Disp: 90 tablet, Rfl: 0  •  omega-3 fatty acids (FISH OIL CONCENTRATE) 1,000 mg capsule, 2 tablet daily, Disp: , Rfl:   •  simvastatin (ZOCOR) 40 mg tablet, TAKE 1 TABLET BY MOUTH EVERY DAY AT NIGHT, Disp: 90 tablet, Rfl: 3      BP Readings from Last 3 Encounters:   11/09/21 (!) 175/83   10/26/21 (!) 175/83   10/05/21 120/72       Recent Lab results:  Lab Results   Component Value Date    CHOL 131 10/05/2021   ,   Lab Results   Component Value Date    HDL 41 10/05/2021   ,   Lab Results   Component Value Date    LDLCALC 51 10/05/2021   ,   Lab Results   Component Value Date    TRIG 251 (H) 10/05/2021        Lab Results   Component Value Date    GLUCOSE 151 (H) 11/09/2021   ,   Lab Results   Component Value Date    HGBA1C 7.1 (H) 10/05/2021         Lab Results   Component Value Date    CREATININE 1.4 (H) 11/09/2021       Lab Results   Component Value Date    TSH 3.360 07/16/2016

## 2022-01-04 NOTE — PROGRESS NOTES
Minnie Renee,   Cardiology    [facility]  [facility] at [address] [address]    [phone]    [facility]     2022     Reason for visit: Cardiovascular follow-up    Vianney Bone is a 71 y.o. female who presents for cardiovascular follow-up. Vianney has a history of hypertension, hyperlipidemia, and diabetes mellitus type 2. I first met Vianney on  for an abnormal ECG.  I recommended an echocardiogram which was normal, results below.  Blood pressure in the office was elevated measuring 175/83 mmHg, however, blood pressure was normal measuring 120/72 mmHg to  at her primary care physician's office.  I recommended home blood pressure monitoring.    She feels well.  She denies chest discomfort, shortness of breath, PND, lower extremity edema, presyncope, syncope.  She has been monitoring her blood pressure at home and tells me it has been running in the 150s over 170s.  She takes her metoprolol at night and HCTZ and fosinopril in the morning.    She does not formally exercise but does note that she walks and plays with her grandchildren.    Vainney is still having a lot of stress in her life as she lost her  and her 42-year-old son this past year.  She tells me her primary care physician prescribed Lexapro but she did not start it.    Past Medical History:  1. Hypertension  2. Preeclampsia, term  3. Diabetes mellitus, type II  4. Hyperlipidemia  5. CKD  6. Insomnia   7. Squamous cell carcinoma  8. Obstetrics history     Past Surgical History:  1. Moh's surgery    Medications: Fosinopril 40 mg daily, hydrochlorothiazide 25 mg daily, metoprolol succinate 25 mg daily, simvastatin 40 mg daily, Metformin 1000 mg twice daily, vitamin D3 2000 units daily, fish oil 2000 units daily, alprazolam as needed    Allergies: Patient has no known allergies.    Social History: Retired. Worked at [employer] for 28 years.  .  Never smoker.  Denies  alcohol use.  Denies illicit drug use.    Family History: Reviewed and significant for father with coronary artery disease with bypass surgery and multiple strokes.  Brother with coronary artery disease and bypass surgery at the age of 65.  Almost all of her 8 siblings of hypertension.  Son with suspected brain aneurysm.      Exam:  Objective   Vitals:    01/05/22 1228   BP: (!) 150/80   Pulse: 82   SpO2: 99%     Body mass index is 23.65 kg/m².  Physical Exam  Constitutional:       Appearance: Normal appearance.   HENT:      Head: Normocephalic and atraumatic.   Eyes:      General: No scleral icterus.  Neck:      Vascular: No JVD.   Cardiovascular:      Rate and Rhythm: Normal rate and regular rhythm.      Heart sounds: No murmur heard.  Pulmonary:      Effort: Pulmonary effort is normal. No respiratory distress.      Breath sounds: Normal breath sounds. No wheezing, rhonchi or rales.   Abdominal:      General: There is no distension.      Palpations: Abdomen is soft.   Musculoskeletal:      Right lower leg: No edema.      Left lower leg: No edema.   Skin:     General: Skin is warm and dry.   Neurological:      Mental Status: She is alert and oriented to person, place, and time.   Psychiatric:         Mood and Affect: Mood normal.         Behavior: Behavior normal.         Labs:  Lab Results   Component Value Date    WBC 6.9 10/05/2021    HGB 11.7 10/05/2021     10/05/2021    CHOL 131 10/05/2021    TRIG 251 (H) 10/05/2021    HDL 41 10/05/2021    ALT 19 10/05/2021    AST 20 10/05/2021     11/09/2021    K 4.0 11/09/2021    CREATININE 1.4 (H) 11/09/2021    TSH 3.360 07/16/2016    HGBA1C 7.1 (H) 10/05/2021    MICROALBUR 13.5 10/05/2021   10/5/2021 lipid panel: Total cholesterol 131 triglycerides 251 HDL 41 LDL 51  Personally reviewed, my interpretation: Hypertriglyceridemia, CKD, normal renal function    Cardiovascular Studies:   1. Echocardiogram 11/9/2021 (personally reviewed): Technically difficult  study. Preserved LV systolic function. Mild concentric left ventricular hypertrophy. Estimated EF 65%. Wall motion appears grossly normal. Indeterminate diastolic function. Normal-sized RV. Normal RV systolic function. Normal-sized LA. Normal-sized RA. Tricuspid aortic valve. Sclerotic aortic valve leaflets. No aortic valve regurgitation. Thickened mitral valve leaflets. Mild mitral annular calcification. No appreciable mitral valve regurgitation. Trace tricuspid valve regurgitation. Estimated RVSP = 27 mmHg. Grossly normal pulmonic valve structure. Trace pulmonic valve regurgitation. Normal-sized IVC. IVC collapses >50% during inspiration. No significant pericardial effusion.  2. Stress echo 4/19/2012: Patient exercised for 7 minutes and 33 seconds.  No chest pain.  Stress EKG showed exaggeration of baseline ST depressions and new T wave inversions.  Stress echo showed no ischemia.  Resting echo showed normal left ventricular size and wall thickness.  LVEF was 65%.  Aortic valve trileaflet.  Trace mitral regurgitation and tricuspid regurgitation.  PA pressure 60 mmHg plus right atrial pressure.      Assessment/Plan   Problem List Items Addressed This Visit        Circulatory    Benign essential hypertension - Primary     Blood pressure elevated in office today measuring 150/80 mmHg.  --Continue metoprolol succinate 25 mg daily, hydrochlorothiazide 25 mg daily, and fosinopril 40 mg daily  --Start amlodipine 5 mg daily  --I have asked Vianney to monitor her blood pressures at home.  She will call me if her blood pressure is persistently greater than 140/90 mmHg          Relevant Medications    amLODIPine 5 mg tablet       Genitourinary    Stage 3 chronic kidney disease (CMS/HCC)     10/5/2021 Cr 1.42  11/9/21 Cr 1.4            Endocrine/Metabolic    Type 2 diabetes mellitus with other specified complication (CMS/[facility])     10/5/2021 A1C 7.1%  --She is on Metformin 1000 mg twice daily and is managed by her primary care  doctor  --Stop simvastatin and start rosuvastatin 20 mg daily         Mixed hyperlipidemia     10/5/2021 lipid panel: Total cholesterol 131 triglycerides 251 HDL 41 LDL 51  --Given addition of amlodipine stop simvastatin and start rosuvastatin 20 mg daily         Relevant Medications    rosuvastatin 20 mg tablet       Other    History of pre-eclampsia     Preeclampsia is a sex-specific cardiovascular risk factor and is associated with premature cardiovascular disease (CVD) including stroke, myocardial infarction, and heart failure with preserved ejection fraction.  --Reviewed that women with a history of adverse pregnancy outcome (APO), including hypertensive disorders of pregnancy (preeclampsia and gestational hypertension), gestational diabetes,  birth, and small for gestational age infant are at increased risk of future cardiovascular events and risk factor development.   --Plan for aggressive lifelong risk factor optimization: Lifestyle modification discussed, including the importance of heart healthy diet and regular aerobic exercise. Ensure adequate blood pressure control with goal less than 130/80 mmHg. Plan for annual fasting glucose and lipid monitoring with target LDL less than 100.                 Minnie Renee, DO    This letter was generated using speech recognition software. Please excuse any typographical errors.    Return in about 4 months (around 2022).

## 2022-01-05 ENCOUNTER — OFFICE VISIT (OUTPATIENT)
Dept: CARDIOLOGY | Facility: CLINIC | Age: 72
End: 2022-01-05
Payer: MEDICARE

## 2022-01-05 VITALS
WEIGHT: 137.8 LBS | SYSTOLIC BLOOD PRESSURE: 150 MMHG | HEART RATE: 82 BPM | BODY MASS INDEX: 23.52 KG/M2 | OXYGEN SATURATION: 99 % | HEIGHT: 64 IN | DIASTOLIC BLOOD PRESSURE: 80 MMHG

## 2022-01-05 DIAGNOSIS — E78.2 MIXED HYPERLIPIDEMIA: ICD-10-CM

## 2022-01-05 DIAGNOSIS — E11.69 TYPE 2 DIABETES MELLITUS WITH OTHER SPECIFIED COMPLICATION, WITHOUT LONG-TERM CURRENT USE OF INSULIN: ICD-10-CM

## 2022-01-05 DIAGNOSIS — N18.31 STAGE 3A CHRONIC KIDNEY DISEASE (CMS/HCC): ICD-10-CM

## 2022-01-05 DIAGNOSIS — Z87.59 HISTORY OF PRE-ECLAMPSIA: ICD-10-CM

## 2022-01-05 DIAGNOSIS — I10 BENIGN ESSENTIAL HYPERTENSION: Primary | ICD-10-CM

## 2022-01-05 PROCEDURE — 99214 OFFICE O/P EST MOD 30 MIN: CPT | Performed by: STUDENT IN AN ORGANIZED HEALTH CARE EDUCATION/TRAINING PROGRAM

## 2022-01-05 PROCEDURE — G8754 DIAS BP LESS 90: HCPCS | Performed by: STUDENT IN AN ORGANIZED HEALTH CARE EDUCATION/TRAINING PROGRAM

## 2022-01-05 PROCEDURE — G8753 SYS BP > OR = 140: HCPCS | Performed by: STUDENT IN AN ORGANIZED HEALTH CARE EDUCATION/TRAINING PROGRAM

## 2022-01-05 RX ORDER — ROSUVASTATIN CALCIUM 20 MG/1
20 TABLET, COATED ORAL DAILY
Qty: 30 TABLET | Refills: 11 | Status: SHIPPED | OUTPATIENT
Start: 2022-01-05 | End: 2022-05-05

## 2022-01-05 RX ORDER — AMLODIPINE BESYLATE 5 MG/1
5 TABLET ORAL DAILY
Qty: 30 TABLET | Refills: 11 | Status: SHIPPED | OUTPATIENT
Start: 2022-01-05 | End: 2022-12-30

## 2022-01-05 NOTE — ASSESSMENT & PLAN NOTE
Blood pressure elevated in office today measuring 150/80 mmHg.  --Continue metoprolol succinate 25 mg daily, hydrochlorothiazide 25 mg daily, and fosinopril 40 mg daily  --Start amlodipine 5 mg daily  --I have asked Vianney to monitor her blood pressures at home.  She will call me if her blood pressure is persistently greater than 140/90 mmHg

## 2022-01-05 NOTE — ASSESSMENT & PLAN NOTE
10/5/2021 A1C 7.1%  --She is on Metformin 1000 mg twice daily and is managed by her primary care doctor  --Stop simvastatin and start rosuvastatin 20 mg daily

## 2022-01-05 NOTE — ASSESSMENT & PLAN NOTE
10/5/2021 lipid panel: Total cholesterol 131 triglycerides 251 HDL 41 LDL 51  --Given addition of amlodipine stop simvastatin and start rosuvastatin 20 mg daily

## 2022-01-10 ENCOUNTER — TELEPHONE (OUTPATIENT)
Dept: FAMILY MEDICINE | Facility: CLINIC | Age: 72
End: 2022-01-10
Payer: MEDICARE

## 2022-01-10 RX ORDER — ALPRAZOLAM 0.25 MG/1
0.25 TABLET ORAL NIGHTLY PRN
Qty: 30 TABLET | Refills: 1 | Status: SHIPPED | OUTPATIENT
Start: 2022-01-10 | End: 2022-01-17 | Stop reason: SDUPTHER

## 2022-01-10 NOTE — TELEPHONE ENCOUNTER
From: Vianney Bone  To: Office of Mer Mills DO  Sent: 1/10/2022 12:22 PM EST  Subject: Medication Renewal Request    Refills have been requested for the following medications:   Other - Xanax     Preferred pharmacy: [id number] - [place], [place] - [address] AT [facility].  Delivery method: Pickup

## 2022-01-10 NOTE — TELEPHONE ENCOUNTER
Patient states too jittery on ativan. Xanax seems to be the one that works the best for her. Looking to switch back to xanax, please send script into her pharmacy.

## 2022-01-10 NOTE — TELEPHONE ENCOUNTER
Response sent to patient to verify medication needed. She requested xanax but our records show clonazepam.

## 2022-03-07 RX ORDER — FOSINOPRIL SODIUM 40 MG/1
TABLET ORAL
Qty: 90 TABLET | Refills: 3 | Status: SHIPPED | OUTPATIENT
Start: 2022-03-07 | End: 2023-04-17 | Stop reason: SDUPTHER

## 2022-03-07 NOTE — TELEPHONE ENCOUNTER
Medicine Refill Request    Last Office: 10/5/2021   Last Consult Visit: Visit date not found  Last Telemedicine Visit: Visit date not found    Next Appointment: Visit date not found      Current Outpatient Medications:   •  amLODIPine 5 mg tablet, Take 1 tablet (5 mg total) by mouth daily., Disp: 30 tablet, Rfl: 11  •  cholecalciferol, vitamin D3, (cholecalciferol) 1,000 unit tablet, take 2 tablets daily, Disp: , Rfl:   •  fosinopriL (MONOPRIL) 40 mg tablet, TAKE 1 TABLET BY MOUTH EVERY DAY, Disp: 90 tablet, Rfl: 3  •  hydrochlorothiazide (HYDRODIURIL) 25 mg tablet, TAKE 1 TABLET BY MOUTH EVERY DAY, Disp: 90 tablet, Rfl: 3  •  metFORMIN 1,000 mg tablet, TAKE 1 TABLET BY MOUTH TWICE A DAY WITH MEALS, Disp: 180 tablet, Rfl: 0  •  metoprolol succinate XL 25 mg 24 hr tablet, Take 1 tablet (25 mg total) by mouth once daily., Disp: 90 tablet, Rfl: 3  •  omega-3 fatty acids (FISH OIL CONCENTRATE) 1,000 mg capsule, 2 tablet daily, Disp: , Rfl:   •  rosuvastatin 20 mg tablet, Take 1 tablet (20 mg total) by mouth daily., Disp: 30 tablet, Rfl: 11      BP Readings from Last 3 Encounters:   01/05/22 (!) 150/80   11/09/21 (!) 175/83   10/26/21 (!) 175/83       Recent Lab results:  Lab Results   Component Value Date    CHOL 131 10/05/2021   ,   Lab Results   Component Value Date    HDL 41 10/05/2021   ,   Lab Results   Component Value Date    LDLCALC 51 10/05/2021   ,   Lab Results   Component Value Date    TRIG 251 (H) 10/05/2021        Lab Results   Component Value Date    GLUCOSE 151 (H) 11/09/2021   ,   Lab Results   Component Value Date    HGBA1C 7.1 (H) 10/05/2021         Lab Results   Component Value Date    CREATININE 1.4 (H) 11/09/2021       Lab Results   Component Value Date    TSH 3.360 07/16/2016

## 2022-04-06 RX ORDER — METFORMIN HYDROCHLORIDE 1000 MG/1
TABLET ORAL
Qty: 180 TABLET | Refills: 0 | Status: SHIPPED | OUTPATIENT
Start: 2022-04-06 | End: 2022-05-13

## 2022-04-06 NOTE — TELEPHONE ENCOUNTER
Medicine Refill Request    Last Office: 10/5/2021   Last Consult Visit: Visit date not found  Last Telemedicine Visit: Visit date not found    Next Appointment: Visit date not found      Current Outpatient Medications:   •  fosinopriL (MONOPRIL) 40 mg tablet, TAKE 1 TABLET BY MOUTH EVERY DAY, Disp: 90 tablet, Rfl: 3  •  amLODIPine 5 mg tablet, Take 1 tablet (5 mg total) by mouth daily., Disp: 30 tablet, Rfl: 11  •  cholecalciferol, vitamin D3, (cholecalciferol) 1,000 unit tablet, take 2 tablets daily, Disp: , Rfl:   •  hydrochlorothiazide (HYDRODIURIL) 25 mg tablet, TAKE 1 TABLET BY MOUTH EVERY DAY, Disp: 90 tablet, Rfl: 3  •  metFORMIN 1,000 mg tablet, TAKE 1 TABLET BY MOUTH TWICE A DAY WITH MEALS, Disp: 180 tablet, Rfl: 0  •  metoprolol succinate XL 25 mg 24 hr tablet, Take 1 tablet (25 mg total) by mouth once daily., Disp: 90 tablet, Rfl: 3  •  omega-3 fatty acids (FISH OIL CONCENTRATE) 1,000 mg capsule, 2 tablet daily, Disp: , Rfl:   •  rosuvastatin 20 mg tablet, Take 1 tablet (20 mg total) by mouth daily., Disp: 30 tablet, Rfl: 11      BP Readings from Last 3 Encounters:   01/05/22 (!) 150/80   11/09/21 (!) 175/83   10/26/21 (!) 175/83       Recent Lab results:  Lab Results   Component Value Date    CHOL 131 10/05/2021   ,   Lab Results   Component Value Date    HDL 41 10/05/2021   ,   Lab Results   Component Value Date    LDLCALC 51 10/05/2021   ,   Lab Results   Component Value Date    TRIG 251 (H) 10/05/2021        Lab Results   Component Value Date    GLUCOSE 151 (H) 11/09/2021   ,   Lab Results   Component Value Date    HGBA1C 7.1 (H) 10/05/2021         Lab Results   Component Value Date    CREATININE 1.4 (H) 11/09/2021       Lab Results   Component Value Date    TSH 3.360 07/16/2016

## 2022-04-14 RX ORDER — HYDROCHLOROTHIAZIDE 25 MG/1
TABLET ORAL
Qty: 90 TABLET | Refills: 3 | Status: SHIPPED | OUTPATIENT
Start: 2022-04-14 | End: 2023-05-15 | Stop reason: SDUPTHER

## 2022-04-14 NOTE — TELEPHONE ENCOUNTER
Medicine Refill Request    Last Office: 10/5/2021   Last Consult Visit: Visit date not found  Last Telemedicine Visit: Visit date not found    Next Appointment: Visit date not found      Current Outpatient Medications:   •  fosinopriL (MONOPRIL) 40 mg tablet, TAKE 1 TABLET BY MOUTH EVERY DAY, Disp: 90 tablet, Rfl: 3  •  amLODIPine 5 mg tablet, Take 1 tablet (5 mg total) by mouth daily., Disp: 30 tablet, Rfl: 11  •  cholecalciferol, vitamin D3, (cholecalciferol) 1,000 unit tablet, take 2 tablets daily, Disp: , Rfl:   •  hydrochlorothiazide (HYDRODIURIL) 25 mg tablet, TAKE 1 TABLET BY MOUTH EVERY DAY, Disp: 90 tablet, Rfl: 3  •  metFORMIN (GLUCOPHAGE) 1,000 mg tablet, TAKE 1 TABLET BY MOUTH TWICE A DAY WITH MEALS, Disp: 180 tablet, Rfl: 0  •  metoprolol succinate XL 25 mg 24 hr tablet, Take 1 tablet (25 mg total) by mouth once daily., Disp: 90 tablet, Rfl: 3  •  omega-3 fatty acids (FISH OIL CONCENTRATE) 1,000 mg capsule, 2 tablet daily, Disp: , Rfl:   •  rosuvastatin 20 mg tablet, Take 1 tablet (20 mg total) by mouth daily., Disp: 30 tablet, Rfl: 11      BP Readings from Last 3 Encounters:   01/05/22 (!) 150/80   11/09/21 (!) 175/83   10/26/21 (!) 175/83       Recent Lab results:  Lab Results   Component Value Date    CHOL 131 10/05/2021   ,   Lab Results   Component Value Date    HDL 41 10/05/2021   ,   Lab Results   Component Value Date    LDLCALC 51 10/05/2021   ,   Lab Results   Component Value Date    TRIG 251 (H) 10/05/2021        Lab Results   Component Value Date    GLUCOSE 151 (H) 11/09/2021   ,   Lab Results   Component Value Date    HGBA1C 7.1 (H) 10/05/2021         Lab Results   Component Value Date    CREATININE 1.4 (H) 11/09/2021       Lab Results   Component Value Date    TSH 3.360 07/16/2016

## 2022-05-04 NOTE — PROGRESS NOTES
Minnie Renee,   Cardiology    [facility]  [facility] at [address] [address]    [phone]    [facility]     2022     Reason for visit: Cardiovascular follow-up    Vianney Bone is a 71 y.o. female who presents for cardiovascular follow-up. Vianney has a history of hypertension, hyperlipidemia, and diabetes mellitus type 2.  I last saw Vianney in the office on  at which time blood pressure was elevated measuring 150/80 mmHg and I recommended the initiation of amlodipine 5 mg in addition to her lisinopril, hydrochlorothiazide, and metoprolol.    She feels well.  She denies chest discomfort, shortness of breath, PND, lower extremity edema, presyncope, syncope.  She has been monitoring her blood pressure at home and tells me it has been running around 127/76.    She tells me rosuvastatin is $160 a month.    Vianney is still having a lot of stress in her life as she lost her  and her 42-year-old son this past year.    Past Medical History:  1. Hypertension  2. Preeclampsia, term  3. Diabetes mellitus, type II  4. Hyperlipidemia  5. CKD  6. Insomnia   7. Squamous cell carcinoma  8. Obstetrics history     Past Surgical History:  1. Moh's surgery    Medications: Fosinopril 40 mg daily, hydrochlorothiazide 25 mg daily, metoprolol succinate 25 mg daily, amlodipine 5 mg daily, simvastatin 40 mg daily, Metformin 1000 mg twice daily, vitamin D3 2000 units daily, fish oil 2000 units daily, alprazolam as needed    Allergies: Patient has no known allergies.    Social History: Retired. Worked at [employer] for 28 years.  .  Never smoker.  Denies alcohol use.  Denies illicit drug use.    Family History: Reviewed and significant for father with coronary artery disease with bypass surgery and multiple strokes.  Brother with coronary artery disease and bypass surgery at the age of 65.  Almost all of her 8 siblings of hypertension.  Son with suspected  brain aneurysm.      Exam:  Objective   Vitals:    05/05/22 1245   BP: 126/74   Pulse: 83   Resp: 16   SpO2: 98%     Body mass index is 23.62 kg/m².  Physical Exam  Constitutional:       Appearance: Normal appearance.   HENT:      Head: Normocephalic and atraumatic.   Eyes:      General: No scleral icterus.  Neck:      Vascular: No JVD.   Cardiovascular:      Rate and Rhythm: Normal rate and regular rhythm.      Heart sounds: No murmur heard.  Pulmonary:      Effort: Pulmonary effort is normal. No respiratory distress.      Breath sounds: Normal breath sounds. No wheezing, rhonchi or rales.   Abdominal:      General: There is no distension.      Palpations: Abdomen is soft.   Musculoskeletal:      Right lower leg: No edema.      Left lower leg: No edema.   Skin:     General: Skin is warm and dry.   Neurological:      Mental Status: She is alert and oriented to person, place, and time.   Psychiatric:         Mood and Affect: Mood normal.         Behavior: Behavior normal.         Labs:  Lab Results   Component Value Date    WBC 6.9 10/05/2021    HGB 11.7 10/05/2021     10/05/2021    CHOL 131 10/05/2021    TRIG 251 (H) 10/05/2021    HDL 41 10/05/2021    ALT 19 10/05/2021    AST 20 10/05/2021     11/09/2021    K 4.0 11/09/2021    CREATININE 1.4 (H) 11/09/2021    TSH 3.360 07/16/2016    HGBA1C 7.1 (H) 10/05/2021    MICROALBUR 13.5 10/05/2021   10/5/2021 lipid panel: Total cholesterol 131 triglycerides 251 HDL 41 LDL 51  Personally reviewed, my interpretation: Hypertriglyceridemia, CKD, normal renal function    Cardiovascular Studies:   1. Echocardiogram 11/9/2021 (personally reviewed): Technically difficult study. Preserved LV systolic function. Mild concentric left ventricular hypertrophy. Estimated EF 65%. Wall motion appears grossly normal. Indeterminate diastolic function. Normal-sized RV. Normal RV systolic function. Normal-sized LA. Normal-sized RA. Tricuspid aortic valve. Sclerotic aortic valve  leaflets. No aortic valve regurgitation. Thickened mitral valve leaflets. Mild mitral annular calcification. No appreciable mitral valve regurgitation. Trace tricuspid valve regurgitation. Estimated RVSP = 27 mmHg. Grossly normal pulmonic valve structure. Trace pulmonic valve regurgitation. Normal-sized IVC. IVC collapses >50% during inspiration. No significant pericardial effusion.  2. Stress echo 4/19/2012: Patient exercised for 7 minutes and 33 seconds.  No chest pain.  Stress EKG showed exaggeration of baseline ST depressions and new T wave inversions.  Stress echo showed no ischemia.  Resting echo showed normal left ventricular size and wall thickness.  LVEF was 65%.  Aortic valve trileaflet.  Trace mitral regurgitation and tricuspid regurgitation.  PA pressure 60 mmHg plus right atrial pressure.      Assessment/Plan   Problem List Items Addressed This Visit        Circulatory    Benign essential hypertension - Primary     Blood pressure in the office today acceptable measuring 126/74 mmHg  --Continue metoprolol succinate 25 mg daily, amlodipine 5 mg, hydrochlorothiazide 25 mg daily, and fosinopril 40 mg daily           Relevant Orders    ECG 12 LEAD-OFFICE PERFORMED (Completed)       Genitourinary    Stage 3 chronic kidney disease (CMS/HCC)     10/5/2021 Cr 1.42  11/9/21 Cr 1.4              Endocrine/Metabolic    Type 2 diabetes mellitus with other specified complication (CMS/[facility])     10/5/2021 A1C 7.1%  --She is on Metformin 1000 mg twice daily and is managed by her primary care doctor  --Stop rosuvastatin due to cost.  We will try atorvastatin 40 mg daily.  She will let me know if this is also expensive           Mixed hyperlipidemia     10/5/2021 lipid panel: Total cholesterol 131 triglycerides 251 HDL 41 LDL 51  --Stop rosuvastatin due to cost and start atorvastatin 40 mg daily.  She will let me know if this medication is also expensive  -- Could consider Vascepa in the future given history of diabetes and  elevated triglycerides           Relevant Medications    atorvastatin (LIPITOR) 40 mg tablet       Other    History of pre-eclampsia     Preeclampsia is a sex-specific cardiovascular risk factor and is associated with premature cardiovascular disease (CVD) including stroke, myocardial infarction, and heart failure with preserved ejection fraction.  --Reviewed that women with a history of adverse pregnancy outcome (APO), including hypertensive disorders of pregnancy (preeclampsia and gestational hypertension), gestational diabetes,  birth, and small for gestational age infant are at increased risk of future cardiovascular events and risk factor development.   --Plan for aggressive lifelong risk factor optimization: Lifestyle modification discussed, including the importance of heart healthy diet and regular aerobic exercise. Ensure adequate blood pressure control with goal less than 130/80 mmHg. Plan for annual fasting glucose and lipid monitoring with target LDL less than 100.                   Minnie Renee, DO    This letter was generated using speech recognition software. Please excuse any typographical errors.    Return in about 1 year (around 2023).

## 2022-05-05 ENCOUNTER — OFFICE VISIT (OUTPATIENT)
Dept: CARDIOLOGY | Facility: CLINIC | Age: 72
End: 2022-05-05
Payer: MEDICARE

## 2022-05-05 VITALS
DIASTOLIC BLOOD PRESSURE: 74 MMHG | OXYGEN SATURATION: 98 % | WEIGHT: 137.6 LBS | HEIGHT: 64 IN | RESPIRATION RATE: 16 BRPM | HEART RATE: 83 BPM | SYSTOLIC BLOOD PRESSURE: 126 MMHG | BODY MASS INDEX: 23.49 KG/M2

## 2022-05-05 DIAGNOSIS — E11.69 TYPE 2 DIABETES MELLITUS WITH OTHER SPECIFIED COMPLICATION, WITHOUT LONG-TERM CURRENT USE OF INSULIN: ICD-10-CM

## 2022-05-05 DIAGNOSIS — I10 BENIGN ESSENTIAL HYPERTENSION: Primary | ICD-10-CM

## 2022-05-05 DIAGNOSIS — N18.31 STAGE 3A CHRONIC KIDNEY DISEASE (CMS/HCC): ICD-10-CM

## 2022-05-05 DIAGNOSIS — E78.2 MIXED HYPERLIPIDEMIA: ICD-10-CM

## 2022-05-05 DIAGNOSIS — Z87.59 HISTORY OF PRE-ECLAMPSIA: ICD-10-CM

## 2022-05-05 PROCEDURE — 93000 ELECTROCARDIOGRAM COMPLETE: CPT | Performed by: STUDENT IN AN ORGANIZED HEALTH CARE EDUCATION/TRAINING PROGRAM

## 2022-05-05 PROCEDURE — G8754 DIAS BP LESS 90: HCPCS | Performed by: STUDENT IN AN ORGANIZED HEALTH CARE EDUCATION/TRAINING PROGRAM

## 2022-05-05 PROCEDURE — G8752 SYS BP LESS 140: HCPCS | Performed by: STUDENT IN AN ORGANIZED HEALTH CARE EDUCATION/TRAINING PROGRAM

## 2022-05-05 PROCEDURE — 99213 OFFICE O/P EST LOW 20 MIN: CPT | Performed by: STUDENT IN AN ORGANIZED HEALTH CARE EDUCATION/TRAINING PROGRAM

## 2022-05-05 RX ORDER — ATORVASTATIN CALCIUM 40 MG/1
40 TABLET, FILM COATED ORAL DAILY
Qty: 30 TABLET | Refills: 11 | Status: SHIPPED | OUTPATIENT
Start: 2022-05-05 | End: 2023-01-16 | Stop reason: ALTCHOICE

## 2022-05-05 RX ORDER — ALPRAZOLAM 0.25 MG/1
TABLET ORAL
COMMUNITY
Start: 2022-04-21 | End: 2022-05-10 | Stop reason: ALTCHOICE

## 2022-05-05 NOTE — ASSESSMENT & PLAN NOTE
Blood pressure in the office today acceptable measuring 126/74 mmHg  --Continue metoprolol succinate 25 mg daily, amlodipine 5 mg, hydrochlorothiazide 25 mg daily, and fosinopril 40 mg daily

## 2022-05-05 NOTE — ASSESSMENT & PLAN NOTE
10/5/2021 A1C 7.1%  --She is on Metformin 1000 mg twice daily and is managed by her primary care doctor  --Stop rosuvastatin due to cost.  We will try atorvastatin 40 mg daily.  She will let me know if this is also expensive

## 2022-05-05 NOTE — ASSESSMENT & PLAN NOTE
10/5/2021 lipid panel: Total cholesterol 131 triglycerides 251 HDL 41 LDL 51  --Stop rosuvastatin due to cost and start atorvastatin 40 mg daily.  She will let me know if this medication is also expensive  -- Could consider Vascepa in the future given history of diabetes and elevated triglycerides

## 2022-05-10 ENCOUNTER — OFFICE VISIT (OUTPATIENT)
Dept: FAMILY MEDICINE | Facility: CLINIC | Age: 72
End: 2022-05-10
Payer: MEDICARE

## 2022-05-10 ENCOUNTER — TELEPHONE (OUTPATIENT)
Dept: FAMILY MEDICINE | Facility: CLINIC | Age: 72
End: 2022-05-10

## 2022-05-10 VITALS
SYSTOLIC BLOOD PRESSURE: 134 MMHG | BODY MASS INDEX: 23.39 KG/M2 | HEART RATE: 84 BPM | HEIGHT: 64 IN | RESPIRATION RATE: 16 BRPM | WEIGHT: 137 LBS | DIASTOLIC BLOOD PRESSURE: 74 MMHG | TEMPERATURE: 97.2 F

## 2022-05-10 DIAGNOSIS — E11.69 TYPE 2 DIABETES MELLITUS WITH OTHER SPECIFIED COMPLICATION, WITHOUT LONG-TERM CURRENT USE OF INSULIN: Primary | ICD-10-CM

## 2022-05-10 DIAGNOSIS — F51.04 PSYCHOPHYSIOLOGICAL INSOMNIA: ICD-10-CM

## 2022-05-10 DIAGNOSIS — I10 BENIGN ESSENTIAL HYPERTENSION: ICD-10-CM

## 2022-05-10 DIAGNOSIS — E55.9 VITAMIN D DEFICIENCY: ICD-10-CM

## 2022-05-10 DIAGNOSIS — R13.10 DYSPHAGIA, UNSPECIFIED TYPE: ICD-10-CM

## 2022-05-10 PROCEDURE — G8754 DIAS BP LESS 90: HCPCS | Performed by: FAMILY MEDICINE

## 2022-05-10 PROCEDURE — 36415 COLL VENOUS BLD VENIPUNCTURE: CPT | Performed by: FAMILY MEDICINE

## 2022-05-10 PROCEDURE — G8752 SYS BP LESS 140: HCPCS | Performed by: FAMILY MEDICINE

## 2022-05-10 PROCEDURE — 99214 OFFICE O/P EST MOD 30 MIN: CPT | Performed by: FAMILY MEDICINE

## 2022-05-10 RX ORDER — TEMAZEPAM 7.5 MG/1
7.5 CAPSULE ORAL NIGHTLY PRN
Qty: 30 CAPSULE | Refills: 0 | Status: SHIPPED | OUTPATIENT
Start: 2022-05-10 | End: 2022-05-13 | Stop reason: CLARIF

## 2022-05-10 ASSESSMENT — ENCOUNTER SYMPTOMS
DIARRHEA: 0
NAUSEA: 0
APPETITE CHANGE: 1
PALPITATIONS: 0
ABDOMINAL PAIN: 0
VOMITING: 0
HEADACHES: 0
TROUBLE SWALLOWING: 1
DIZZINESS: 0
NUMBNESS: 0
CONSTIPATION: 0
SHORTNESS OF BREATH: 0
LIGHT-HEADEDNESS: 0
COUGH: 0
CHEST TIGHTNESS: 0

## 2022-05-10 NOTE — TELEPHONE ENCOUNTER
----- Message from Mer Mills, DO sent at 5/10/2022  2:24 PM EDT -----  Let patient know that I sent in the prescription for the temazepam 7.5 mg to take at bedtime for sleep.  She should discontinue the Xanax.

## 2022-05-10 NOTE — PROGRESS NOTES
"      Subjective      Patient ID: Vianney Bone is a 71 y.o. female.  1950      Patient presents for follow-up to her chronic medical conditions (diabetes, high blood pressure and insomnia).  Patient has had some dysphagia with food and medication about a month ago.  It lasted a few weeks and has gotten better.  She has had no choking or gagging.  She took Tums. She does not experience heartburn.  She has no nausea or hoarseness.   She has occasional dry cough with allergies.  No chest pain or SOB.  She is still not sleeping well at night despite taking Xanax at bedtime.  She has not had much of an appetite.  She denies any chest pain or shortness of breath.      The following have been reviewed and updated as appropriate in this visit:   Tobacco  Allergies  Meds  Problems  Med Hx  Surg Hx  Fam Hx         Review of Systems   Constitutional: Positive for appetite change.   HENT: Positive for trouble swallowing.    Respiratory: Negative for cough, chest tightness and shortness of breath.    Cardiovascular: Negative for chest pain, palpitations and leg swelling.   Gastrointestinal: Negative for abdominal pain, constipation, diarrhea, nausea and vomiting.   Neurological: Negative for dizziness, light-headedness, numbness and headaches.       Objective     Vitals:    05/10/22 1321   BP: 134/74   Pulse: 84   Resp: 16   Temp: 36.2 °C (97.2 °F)   Weight: 62.1 kg (137 lb)   Height: 1.626 m (5' 4\")     Body mass index is 23.52 kg/m².    Physical Exam  Vitals reviewed.   Cardiovascular:      Rate and Rhythm: Normal rate and regular rhythm.      Pulses:           Dorsalis pedis pulses are 2+ on the right side and 2+ on the left side.        Posterior tibial pulses are 2+ on the right side and 2+ on the left side.      Heart sounds: Normal heart sounds. No murmur heard.  Pulmonary:      Effort: Pulmonary effort is normal. No respiratory distress.      Breath sounds: Normal breath sounds. No wheezing.   Abdominal:    "   General: Bowel sounds are normal. There is no distension.      Palpations: Abdomen is soft.      Tenderness: There is no abdominal tenderness.   Musculoskeletal:      Right lower leg: No edema.      Left lower leg: No edema.   Neurological:      Mental Status: She is alert.         Assessment/Plan   Diagnoses and all orders for this visit:    Type 2 diabetes mellitus with other specified complication, without long-term current use of insulin (CMS/[facility]) (Primary)  Assessment & Plan:  Stable  Check HgbA1c today  May need to change metformin if her renal function is still abnormal    Orders:  -     Comprehensive metabolic panel  -     Hemoglobin A1c    Vitamin D deficiency  -     Vitamin D 25 hydroxy    Benign essential hypertension  Assessment & Plan:  Controlled  Continue current medications      Dysphagia, unspecified type  Assessment & Plan:  Will order upper GI to check for GERD versus hiatal hernia versus esophageal narrowing    Orders:  -     FLUOROSCOPY UPPER GI WITH AIR WITHOUT KUB; Future    Psychophysiological insomnia  Assessment & Plan:  Stop Xanax  Prescribed Restoril 7.5 mg to take at bedtime      Other orders  -     temazepam (RestoriL) 7.5 mg capsule; Take 1 capsule (7.5 mg total) by mouth nightly as needed for sleep.

## 2022-05-11 LAB
25(OH)D3+25(OH)D2 SERPL-MCNC: 20.9 NG/ML (ref 30–100)
ALBUMIN SERPL-MCNC: 4.7 G/DL (ref 3.8–4.8)
ALBUMIN/GLOB SERPL: 2.2 {RATIO} (ref 1.2–2.2)
ALP SERPL-CCNC: 65 IU/L (ref 44–121)
ALT SERPL-CCNC: 22 IU/L (ref 0–32)
AST SERPL-CCNC: 22 IU/L (ref 0–40)
BILIRUB SERPL-MCNC: 0.5 MG/DL (ref 0–1.2)
BUN SERPL-MCNC: 21 MG/DL (ref 8–27)
BUN/CREAT SERPL: 15 (ref 12–28)
CALCIUM SERPL-MCNC: 10 MG/DL (ref 8.7–10.3)
CHLORIDE SERPL-SCNC: 99 MMOL/L (ref 96–106)
CO2 SERPL-SCNC: 21 MMOL/L (ref 20–29)
CREAT SERPL-MCNC: 1.43 MG/DL (ref 0.57–1)
EGFRCR SERPLBLD CKD-EPI 2021: 39 ML/MIN/1.73
GLOBULIN SER CALC-MCNC: 2.1 G/DL (ref 1.5–4.5)
GLUCOSE SERPL-MCNC: 102 MG/DL (ref 65–99)
HBA1C MFR BLD: 6.7 % (ref 4.8–5.6)
POTASSIUM SERPL-SCNC: 4 MMOL/L (ref 3.5–5.2)
PROT SERPL-MCNC: 6.8 G/DL (ref 6–8.5)
SODIUM SERPL-SCNC: 141 MMOL/L (ref 134–144)

## 2022-05-13 DIAGNOSIS — R79.89 ELEVATED SERUM CREATININE: Primary | ICD-10-CM

## 2022-05-13 RX ORDER — ALPRAZOLAM 0.25 MG/1
TABLET ORAL
Qty: 30 TABLET | COMMUNITY
Start: 2022-05-13 | End: 2022-06-13 | Stop reason: SDUPTHER

## 2022-05-13 RX ORDER — METFORMIN HYDROCHLORIDE 1000 MG/1
500 TABLET ORAL 2 TIMES DAILY WITH MEALS
Qty: 180 TABLET | Refills: 0 | COMMUNITY
Start: 2022-05-13 | End: 2023-05-08 | Stop reason: SDUPTHER

## 2022-06-13 ENCOUNTER — OFFICE VISIT (OUTPATIENT)
Dept: FAMILY MEDICINE | Facility: CLINIC | Age: 72
End: 2022-06-13
Payer: MEDICARE

## 2022-06-13 VITALS
HEART RATE: 88 BPM | DIASTOLIC BLOOD PRESSURE: 80 MMHG | SYSTOLIC BLOOD PRESSURE: 156 MMHG | HEIGHT: 64 IN | TEMPERATURE: 97.1 F | BODY MASS INDEX: 23.56 KG/M2 | RESPIRATION RATE: 18 BRPM | WEIGHT: 138 LBS

## 2022-06-13 DIAGNOSIS — F51.04 PSYCHOPHYSIOLOGICAL INSOMNIA: ICD-10-CM

## 2022-06-13 DIAGNOSIS — N30.01 ACUTE CYSTITIS WITH HEMATURIA: Primary | ICD-10-CM

## 2022-06-13 LAB
BILIRUBIN, POC: NEGATIVE
BLOOD URINE, POC: POSITIVE
CLARITY, POC: ABNORMAL
COLOR, POC: YELLOW
EXPIRATION DATE: ABNORMAL
GLUCOSE URINE, POC: ABNORMAL
KETONES, POC: NEGATIVE
LEUKOCYTE EST, POC: ABNORMAL
Lab: ABNORMAL
NITRITE, POC: NEGATIVE
PH, POC: 6
POCT MANUFACTURER: ABNORMAL
PROTEIN, POC: ABNORMAL
SPECIFIC GRAVITY, POC: 0.01
UROBILINOGEN, POC: 0.2

## 2022-06-13 PROCEDURE — G8753 SYS BP > OR = 140: HCPCS | Performed by: FAMILY MEDICINE

## 2022-06-13 PROCEDURE — 81002 URINALYSIS NONAUTO W/O SCOPE: CPT | Performed by: FAMILY MEDICINE

## 2022-06-13 PROCEDURE — G8754 DIAS BP LESS 90: HCPCS | Performed by: FAMILY MEDICINE

## 2022-06-13 PROCEDURE — 99213 OFFICE O/P EST LOW 20 MIN: CPT | Performed by: FAMILY MEDICINE

## 2022-06-13 RX ORDER — NITROFURANTOIN 25; 75 MG/1; MG/1
100 CAPSULE ORAL 2 TIMES DAILY
Qty: 14 CAPSULE | Refills: 0 | Status: SHIPPED | OUTPATIENT
Start: 2022-06-13 | End: 2022-06-20

## 2022-06-13 RX ORDER — ALPRAZOLAM 0.25 MG/1
TABLET ORAL
Qty: 60 TABLET | Refills: 3 | Status: CANCELLED | OUTPATIENT
Start: 2022-06-13

## 2022-06-13 RX ORDER — ALPRAZOLAM 0.25 MG/1
TABLET ORAL
Qty: 60 TABLET | Refills: 3 | Status: SHIPPED | OUTPATIENT
Start: 2022-06-13 | End: 2022-12-18 | Stop reason: SDUPTHER

## 2022-06-13 ASSESSMENT — ENCOUNTER SYMPTOMS
HEMATURIA: 1
FEVER: 0
ABDOMINAL PAIN: 0
BACK PAIN: 0
DYSURIA: 1
FREQUENCY: 1
CHILLS: 0

## 2022-06-13 NOTE — PROGRESS NOTES
"      Subjective      Patient ID: Vianney Bone is a 71 y.o. female.  1950      Difficulty Urinating   This is a new problem. Episode onset: 2 days ago. The problem occurs every urination. The problem has been unchanged. The quality of the pain is described as burning. There has been no fever. She is not sexually active. There is no history of pyelonephritis. Associated symptoms include frequency and hematuria. Pertinent negatives include no chills. She has tried increased fluids for the symptoms. The treatment provided no relief.       The following have been reviewed and updated as appropriate in this visit:   Tobacco  Allergies  Meds  Problems  Med Hx  Surg Hx  Fam Hx         Review of Systems   Constitutional: Negative for chills and fever.   Gastrointestinal: Negative for abdominal pain.   Genitourinary: Positive for dysuria, frequency and hematuria.   Musculoskeletal: Negative for back pain.       Objective     Vitals:    06/13/22 1127   BP: (!) 156/80   Pulse: 88   Resp: 18   Temp: 36.2 °C (97.1 °F)   Weight: 62.6 kg (138 lb)   Height: 1.626 m (5' 4\")     Body mass index is 23.69 kg/m².    Physical Exam  Vitals reviewed.   Constitutional:       Appearance: Normal appearance.   Cardiovascular:      Rate and Rhythm: Normal rate and regular rhythm.      Heart sounds: Normal heart sounds. No murmur heard.  Pulmonary:      Effort: Pulmonary effort is normal. No respiratory distress.      Breath sounds: Normal breath sounds.   Abdominal:      General: Bowel sounds are normal. There is no distension.      Palpations: There is no mass.      Tenderness: There is no abdominal tenderness. There is left CVA tenderness. There is no right CVA tenderness or guarding.   Musculoskeletal:      Right lower leg: No edema.      Left lower leg: No edema.   Neurological:      Mental Status: She is alert.         Assessment/Plan   Diagnoses and all orders for this visit:    Acute cystitis with hematuria " (Primary)  Assessment & Plan:  UA shows blood, LE and protein/glucose  Send urine culture  Prescribed Macrobid 100mg 2x day for 7 days    Orders:  -     Urine culture  -     POCT urinalysis dipstick    Psychophysiological insomnia  Assessment & Plan:  Stable  Refilled xanax      Other orders  -     nitrofurantoin, macrocrystal-monohydrate, (MACROBID) 100 mg capsule; Take 1 capsule (100 mg total) by mouth 2 (two) times a day for 7 days.  -     ALPRAZolam (XANAX) 0.25 mg tablet; TAKE 1 TABLET BY MOUTH 2 TIMES A DAY AS NEEDED FOR ANXIETY OR SLEEP.

## 2022-06-13 NOTE — ASSESSMENT & PLAN NOTE
UA shows blood, LE and protein/glucose  Send urine culture  Prescribed Macrobid 100mg 2x day for 7 days

## 2022-06-15 LAB
BACTERIA UR CULT: NO GROWTH
BACTERIA UR CULT: NORMAL

## 2022-06-15 NOTE — RESULT ENCOUNTER NOTE
Notify patient urine culture is negative I would like her to continue and finish the antibiotics.  Follow-up if her symptoms persist or worsen.

## 2022-08-16 ENCOUNTER — HOSPITAL ENCOUNTER (OUTPATIENT)
Dept: RADIOLOGY | Facility: HOSPITAL | Age: 72
Discharge: HOME | End: 2022-08-16
Attending: FAMILY MEDICINE
Payer: MEDICARE

## 2022-08-16 DIAGNOSIS — R13.10 DYSPHAGIA, UNSPECIFIED TYPE: ICD-10-CM

## 2022-08-16 PROCEDURE — 74246 X-RAY XM UPR GI TRC 2CNTRST: CPT

## 2022-08-18 NOTE — RESULT ENCOUNTER NOTE
Hi Vianney, we got back a swallowing test that Dr. Mills had ordered.  It did show that you have some reflux with a small hiatal hernia.  This certainly could be causing some issues with reflux symptoms or swallowing issues.  I would recommend you come in for follow-up if symptoms have been persistent or worse.  Monique Mcdonald

## 2022-08-22 ENCOUNTER — APPOINTMENT (OUTPATIENT)
Dept: URBAN - METROPOLITAN AREA CLINIC 203 | Age: 72
Setting detail: DERMATOLOGY
End: 2022-08-22

## 2022-08-22 DIAGNOSIS — B07.8 OTHER VIRAL WARTS: ICD-10-CM

## 2022-08-22 DIAGNOSIS — L82.1 OTHER SEBORRHEIC KERATOSIS: ICD-10-CM

## 2022-08-22 DIAGNOSIS — L57.8 OTHER SKIN CHANGES DUE TO CHRONIC EXPOSURE TO NONIONIZING RADIATION: ICD-10-CM

## 2022-08-22 DIAGNOSIS — Z11.52 ENCOUNTER FOR SCREENING FOR COVID-19: ICD-10-CM

## 2022-08-22 DIAGNOSIS — Z85.828 PERSONAL HISTORY OF OTHER MALIGNANT NEOPLASM OF SKIN: ICD-10-CM

## 2022-08-22 DIAGNOSIS — D485 NEOPLASM OF UNCERTAIN BEHAVIOR OF SKIN: ICD-10-CM

## 2022-08-22 PROBLEM — D48.5 NEOPLASM OF UNCERTAIN BEHAVIOR OF SKIN: Status: ACTIVE | Noted: 2022-08-22

## 2022-08-22 PROCEDURE — OTHER LIQUID NITROGEN: OTHER

## 2022-08-22 PROCEDURE — OTHER COUNSELING: OTHER

## 2022-08-22 PROCEDURE — OTHER BIOPSY BY SHAVE METHOD: OTHER

## 2022-08-22 PROCEDURE — OTHER SCREENING FOR COVID-19: OTHER

## 2022-08-22 PROCEDURE — OTHER SUNSCREEN RECOMMENDATIONS: OTHER

## 2022-08-22 PROCEDURE — OTHER PHOTO-DOCUMENTATION: OTHER

## 2022-08-22 PROCEDURE — 17110 DESTRUCT B9 LESION 1-14: CPT

## 2022-08-22 PROCEDURE — 99213 OFFICE O/P EST LOW 20 MIN: CPT | Mod: 25

## 2022-08-22 PROCEDURE — 11102 TANGNTL BX SKIN SINGLE LES: CPT | Mod: 59

## 2022-08-22 ASSESSMENT — LOCATION SIMPLE DESCRIPTION DERM
LOCATION SIMPLE: NECK
LOCATION SIMPLE: LEFT BREAST
LOCATION SIMPLE: ABDOMEN
LOCATION SIMPLE: LEFT FOREHEAD
LOCATION SIMPLE: LEFT HAND
LOCATION SIMPLE: LEFT CALF

## 2022-08-22 ASSESSMENT — LOCATION DETAILED DESCRIPTION DERM
LOCATION DETAILED: LEFT MEDIAL BREAST 11-12:00 REGION
LOCATION DETAILED: LEFT MEDIAL BREAST 10-11:00 REGION
LOCATION DETAILED: EPIGASTRIC SKIN
LOCATION DETAILED: LEFT RADIAL DORSAL HAND
LOCATION DETAILED: LEFT DISTAL CALF
LOCATION DETAILED: LEFT INFERIOR FOREHEAD
LOCATION DETAILED: LEFT SUPERIOR LATERAL NECK

## 2022-08-22 ASSESSMENT — PAIN INTENSITY VAS: HOW INTENSE IS YOUR PAIN 0 BEING NO PAIN, 10 BEING THE MOST SEVERE PAIN POSSIBLE?: NO PAIN

## 2022-08-22 ASSESSMENT — LOCATION ZONE DERM
LOCATION ZONE: HAND
LOCATION ZONE: NECK
LOCATION ZONE: FACE
LOCATION ZONE: LEG
LOCATION ZONE: TRUNK

## 2022-08-22 NOTE — PROCEDURE: LIQUID NITROGEN
Include Z78.9 (Other Specified Conditions Influencing Health Status) As An Associated Diagnosis?: No
Show Aperture Variable?: Yes
Post-Care Instructions: I reviewed with the patient in detail post-care instructions. Patient is to wear sunprotection, and avoid picking at any of the treated lesions. Pt may apply Vaseline to crusted or scabbing areas.
Spray Paint Text: The liquid nitrogen was applied to the skin utilizing a spray paint frosting technique.
Medical Necessity Information: It is in your best interest to select a reason for this procedure from the list below. All of these items fulfill various CMS LCD requirements except the new and changing color options.
Detail Level: Detailed
Medical Necessity Clause: This procedure was medically necessary because the lesions that were treated were:
Consent: The patient's consent was obtained including but not limited to risks of crusting, scabbing, blistering, scarring, darker or lighter pigmentary change, recurrence, incomplete removal and infection.

## 2022-08-22 NOTE — PROCEDURE: BIOPSY BY SHAVE METHOD
Biopsy Type: H and E Arava Counseling:  Patient counseled regarding adverse effects of Arava including but not limited to nausea, vomiting, abnormalities in liver function tests. Patients may develop mouth sores, rash, diarrhea, and abnormalities in blood counts. The patient understands that monitoring is required including LFTs and blood counts.  There is a rare possibility of scarring of the liver and lung problems that can occur when taking methotrexate. Persistent nausea, loss of appetite, pale stools, dark urine, cough, and shortness of breath should be reported immediately. Patient advised to discontinue Arava treatment and consult with a physician prior to attempting conception. The patient will have to undergo a treatment to eliminate Arava from the body prior to conception.

## 2022-08-30 ENCOUNTER — RX ONLY (RX ONLY)
Age: 72
End: 2022-08-30

## 2022-08-30 RX ORDER — FLUOROURACIL 5 MG/G
CREAM TOPICAL
Qty: 40 | Refills: 0 | Status: ERX | COMMUNITY
Start: 2022-08-30

## 2022-10-20 ENCOUNTER — APPOINTMENT (OUTPATIENT)
Dept: URBAN - METROPOLITAN AREA CLINIC 203 | Age: 72
Setting detail: DERMATOLOGY
End: 2022-10-24

## 2022-10-20 DIAGNOSIS — Z85.828 PERSONAL HISTORY OF OTHER MALIGNANT NEOPLASM OF SKIN: ICD-10-CM

## 2022-10-20 DIAGNOSIS — L57.0 ACTINIC KERATOSIS: ICD-10-CM

## 2022-10-20 DIAGNOSIS — Z11.52 ENCOUNTER FOR SCREENING FOR COVID-19: ICD-10-CM

## 2022-10-20 PROBLEM — D04.39 CARCINOMA IN SITU OF SKIN OF OTHER PARTS OF FACE: Status: ACTIVE | Noted: 2022-10-20

## 2022-10-20 PROCEDURE — OTHER SCREENING FOR COVID-19: OTHER

## 2022-10-20 PROCEDURE — 17000 DESTRUCT PREMALG LESION: CPT

## 2022-10-20 PROCEDURE — OTHER PRESCRIPTION MEDICATION MANAGEMENT: OTHER

## 2022-10-20 PROCEDURE — 99213 OFFICE O/P EST LOW 20 MIN: CPT | Mod: 25

## 2022-10-20 PROCEDURE — OTHER PHOTO-DOCUMENTATION: OTHER

## 2022-10-20 PROCEDURE — OTHER LIQUID NITROGEN: OTHER

## 2022-10-20 PROCEDURE — OTHER COUNSELING: OTHER

## 2022-10-20 ASSESSMENT — LOCATION ZONE DERM
LOCATION ZONE: TRUNK
LOCATION ZONE: FACE
LOCATION ZONE: LEG

## 2022-10-20 ASSESSMENT — LOCATION SIMPLE DESCRIPTION DERM
LOCATION SIMPLE: LEFT FOREHEAD
LOCATION SIMPLE: ABDOMEN
LOCATION SIMPLE: LEFT CALF

## 2022-10-20 ASSESSMENT — PAIN INTENSITY VAS: HOW INTENSE IS YOUR PAIN 0 BEING NO PAIN, 10 BEING THE MOST SEVERE PAIN POSSIBLE?: NO PAIN

## 2022-10-20 ASSESSMENT — LOCATION DETAILED DESCRIPTION DERM
LOCATION DETAILED: LEFT DISTAL CALF
LOCATION DETAILED: EPIGASTRIC SKIN
LOCATION DETAILED: LEFT SUPERIOR FOREHEAD

## 2022-10-20 NOTE — PROCEDURE: PRESCRIPTION MEDICATION MANAGEMENT
Render In Strict Bullet Format?: No
Plan: Pt used effudex x6 weeks. Scc resolved.
Detail Level: Zone

## 2022-10-20 NOTE — PROCEDURE: LIQUID NITROGEN
Duration Of Freeze Thaw-Cycle (Seconds): 0
Show Applicator Variable?: Yes
Detail Level: Detailed
Consent: The patient's consent was obtained including but not limited to risks of crusting, scabbing, blistering, scarring, darker or lighter pigmentary change, recurrence, incomplete removal and infection.
Post-Care Instructions: I reviewed with the patient in detail post-care instructions. Patient is to wear sunprotection, and avoid picking at any of the treated lesions. Pt may apply Vaseline to crusted or scabbing areas.
Render Post-Care Instructions In Note?: no
Application Tool (Optional): Liquid Nitrogen Sprayer

## 2022-12-18 RX ORDER — ALPRAZOLAM 0.25 MG/1
TABLET ORAL
Qty: 60 TABLET | Refills: 1 | Status: SHIPPED | OUTPATIENT
Start: 2022-12-18 | End: 2023-04-17

## 2022-12-18 NOTE — TELEPHONE ENCOUNTER
Medicine Refill Request    Last Office: 6/13/2022   Last Consult Visit: Visit date not found  Last Telemedicine Visit: Visit date not found    Next Appointment: Visit date not found      Current Outpatient Medications:   •  ALPRAZolam (XANAX) 0.25 mg tablet, TAKE 1 TABLET BY MOUTH 2 TIMES A DAY AS NEEDED FOR ANXIETY OR SLEEP., Disp: 60 tablet, Rfl: 3  •  amLODIPine 5 mg tablet, Take 1 tablet (5 mg total) by mouth daily., Disp: 30 tablet, Rfl: 11  •  atorvastatin (LIPITOR) 40 mg tablet, Take 1 tablet (40 mg total) by mouth daily., Disp: 30 tablet, Rfl: 11  •  cholecalciferol, vitamin D3, 1,000 unit (25 mcg) tablet, take 2 tablets daily, Disp: , Rfl:   •  fosinopriL (MONOPRIL) 40 mg tablet, TAKE 1 TABLET BY MOUTH EVERY DAY, Disp: 90 tablet, Rfl: 3  •  hydrochlorothiazide (HYDRODIURIL) 25 mg tablet, TAKE 1 TABLET BY MOUTH EVERY DAY, Disp: 90 tablet, Rfl: 3  •  metFORMIN (GLUCOPHAGE) 1,000 mg tablet, Take 0.5 tablets (500 mg total) by mouth 2 (two) times a day with meals., Disp: 180 tablet, Rfl: 0  •  metoprolol succinate XL 25 mg 24 hr tablet, Take 1 tablet (25 mg total) by mouth once daily., Disp: 90 tablet, Rfl: 3      BP Readings from Last 3 Encounters:   06/13/22 (!) 156/80   05/10/22 134/74   05/05/22 126/74       Recent Lab results:  Lab Results   Component Value Date    CHOL 131 10/05/2021   ,   Lab Results   Component Value Date    HDL 41 10/05/2021   ,   Lab Results   Component Value Date    LDLCALC 51 10/05/2021   ,   Lab Results   Component Value Date    TRIG 251 (H) 10/05/2021        Lab Results   Component Value Date    GLUCOSE 102 (H) 05/10/2022   ,   Lab Results   Component Value Date    HGBA1C 6.7 (H) 05/10/2022         Lab Results   Component Value Date    CREATININE 1.43 (H) 05/10/2022       Lab Results   Component Value Date    TSH 3.360 07/16/2016

## 2022-12-20 ENCOUNTER — HOSPITAL ENCOUNTER (OUTPATIENT)
Dept: RADIOLOGY | Facility: HOSPITAL | Age: 72
Discharge: HOME | End: 2022-12-20
Attending: OBSTETRICS & GYNECOLOGY
Payer: MEDICARE

## 2022-12-20 ENCOUNTER — TRANSCRIBE ORDERS (OUTPATIENT)
Dept: REGISTRATION | Facility: HOSPITAL | Age: 72
End: 2022-12-20

## 2022-12-20 DIAGNOSIS — Z12.31 ENCOUNTER FOR SCREENING MAMMOGRAM FOR MALIGNANT NEOPLASM OF BREAST: ICD-10-CM

## 2022-12-20 DIAGNOSIS — Z12.31 ENCOUNTER FOR SCREENING MAMMOGRAM FOR MALIGNANT NEOPLASM OF BREAST: Primary | ICD-10-CM

## 2022-12-20 PROCEDURE — 77063 BREAST TOMOSYNTHESIS BI: CPT

## 2022-12-30 DIAGNOSIS — I10 BENIGN ESSENTIAL HYPERTENSION: Primary | ICD-10-CM

## 2022-12-30 RX ORDER — AMLODIPINE BESYLATE 5 MG/1
5 TABLET ORAL DAILY
Qty: 90 TABLET | Refills: 1 | Status: SHIPPED | OUTPATIENT
Start: 2022-12-30 | End: 2023-01-19

## 2023-01-16 RX ORDER — METOPROLOL SUCCINATE 25 MG/1
25 TABLET, EXTENDED RELEASE ORAL
Qty: 90 TABLET | Refills: 1 | Status: SHIPPED | OUTPATIENT
Start: 2023-01-16 | End: 2023-01-19

## 2023-01-16 RX ORDER — SIMVASTATIN 40 MG/1
40 TABLET, FILM COATED ORAL NIGHTLY
Qty: 90 TABLET | Refills: 1 | Status: SHIPPED | OUTPATIENT
Start: 2023-01-16 | End: 2023-01-19

## 2023-01-16 NOTE — TELEPHONE ENCOUNTER
Medicine Refill Request    Last Office: 6/13/2022   Last Consult Visit: Visit date not found  Last Telemedicine Visit: Visit date not found    Next Appointment: Visit date not found      Current Outpatient Medications:   •  ALPRAZolam (XANAX) 0.25 mg tablet, TAKE 1 TABLET BY MOUTH 2 TIMES A DAY AS NEEDED FOR ANXIETY OR SLEEP., Disp: 60 tablet, Rfl: 1  •  amLODIPine (NORVASC) 5 mg tablet, Take 1 tablet (5 mg total) by mouth daily., Disp: 90 tablet, Rfl: 1  •  cholecalciferol, vitamin D3, 1,000 unit (25 mcg) tablet, take 2 tablets daily, Disp: , Rfl:   •  fosinopriL (MONOPRIL) 40 mg tablet, TAKE 1 TABLET BY MOUTH EVERY DAY, Disp: 90 tablet, Rfl: 3  •  hydrochlorothiazide (HYDRODIURIL) 25 mg tablet, TAKE 1 TABLET BY MOUTH EVERY DAY, Disp: 90 tablet, Rfl: 3  •  metFORMIN (GLUCOPHAGE) 1,000 mg tablet, Take 0.5 tablets (500 mg total) by mouth 2 (two) times a day with meals., Disp: 180 tablet, Rfl: 0  •  metoprolol succinate XL 25 mg 24 hr tablet, Take 1 tablet (25 mg total) by mouth once daily., Disp: 90 tablet, Rfl: 3  •  simvastatin (ZOCOR) 40 mg tablet, Take 1 tablet (40 mg total) by mouth nightly., Disp: 90 tablet, Rfl: 1      BP Readings from Last 3 Encounters:   06/13/22 (!) 156/80   05/10/22 134/74   05/05/22 126/74       Recent Lab results:  Lab Results   Component Value Date    CHOL 131 10/05/2021   ,   Lab Results   Component Value Date    HDL 41 10/05/2021   ,   Lab Results   Component Value Date    LDLCALC 51 10/05/2021   ,   Lab Results   Component Value Date    TRIG 251 (H) 10/05/2021        Lab Results   Component Value Date    GLUCOSE 102 (H) 05/10/2022   ,   Lab Results   Component Value Date    HGBA1C 6.7 (H) 05/10/2022         Lab Results   Component Value Date    CREATININE 1.43 (H) 05/10/2022       Lab Results   Component Value Date    TSH 3.360 07/16/2016

## 2023-01-16 NOTE — TELEPHONE ENCOUNTER
Medicine Refill Request    Last Office: 6/13/2022   Last Consult Visit: Visit date not found  Last Telemedicine Visit: Visit date not found    Next Appointment: Visit date not found      Current Outpatient Medications:   •  ALPRAZolam (XANAX) 0.25 mg tablet, TAKE 1 TABLET BY MOUTH 2 TIMES A DAY AS NEEDED FOR ANXIETY OR SLEEP., Disp: 60 tablet, Rfl: 1  •  amLODIPine (NORVASC) 5 mg tablet, Take 1 tablet (5 mg total) by mouth daily., Disp: 90 tablet, Rfl: 1  •  atorvastatin (LIPITOR) 40 mg tablet, Take 1 tablet (40 mg total) by mouth daily., Disp: 30 tablet, Rfl: 11  •  cholecalciferol, vitamin D3, 1,000 unit (25 mcg) tablet, take 2 tablets daily, Disp: , Rfl:   •  fosinopriL (MONOPRIL) 40 mg tablet, TAKE 1 TABLET BY MOUTH EVERY DAY, Disp: 90 tablet, Rfl: 3  •  hydrochlorothiazide (HYDRODIURIL) 25 mg tablet, TAKE 1 TABLET BY MOUTH EVERY DAY, Disp: 90 tablet, Rfl: 3  •  metFORMIN (GLUCOPHAGE) 1,000 mg tablet, Take 0.5 tablets (500 mg total) by mouth 2 (two) times a day with meals., Disp: 180 tablet, Rfl: 0  •  metoprolol succinate XL 25 mg 24 hr tablet, Take 1 tablet (25 mg total) by mouth once daily., Disp: 90 tablet, Rfl: 3      BP Readings from Last 3 Encounters:   06/13/22 (!) 156/80   05/10/22 134/74   05/05/22 126/74       Recent Lab results:  Lab Results   Component Value Date    CHOL 131 10/05/2021   ,   Lab Results   Component Value Date    HDL 41 10/05/2021   ,   Lab Results   Component Value Date    LDLCALC 51 10/05/2021   ,   Lab Results   Component Value Date    TRIG 251 (H) 10/05/2021        Lab Results   Component Value Date    GLUCOSE 102 (H) 05/10/2022   ,   Lab Results   Component Value Date    HGBA1C 6.7 (H) 05/10/2022         Lab Results   Component Value Date    CREATININE 1.43 (H) 05/10/2022       Lab Results   Component Value Date    TSH 3.360 07/16/2016            Abdomen soft, non-tender, no guarding.

## 2023-01-18 ENCOUNTER — TELEPHONE (OUTPATIENT)
Dept: CARDIOLOGY | Facility: CLINIC | Age: 73
End: 2023-01-18
Payer: MEDICARE

## 2023-01-18 NOTE — TELEPHONE ENCOUNTER
Spoke to the patient, she was actually on Atorvastatin  due to the high cost of Rosuvastatin. However it sounds like she was having some side effects of the Atorvastatin so she reached out to her PCP to switch Simvastatin. I explained to the patient that there is a drug interaction between these meds. She would prefer not to make any changes. I let her know that I would reach out to you.

## 2023-01-18 NOTE — TELEPHONE ENCOUNTER
Left a voicemail asking the patient to return my call regarding a fax our office received from her prescription plan regarding an interaction between Amlodipine and Simvastatin.     According to office notes the patient was switch to rosuvastatin due to this interaction. Looks like the patient reached out to her PCP requesting switching back to Simvastatin.     I wonder if she is having difficulty with  Rosuvastatin. If so will discuss the possibility of switching Amlodipine to a different med with Dr. Kimbrough.

## 2023-01-19 RX ORDER — ATORVASTATIN CALCIUM 40 MG/1
40 TABLET, FILM COATED ORAL DAILY
Qty: 90 TABLET | Refills: 1 | Status: SHIPPED | OUTPATIENT
Start: 2023-01-19 | End: 2023-10-16 | Stop reason: SDUPTHER

## 2023-01-19 RX ORDER — CARVEDILOL 6.25 MG/1
6.25 TABLET ORAL 2 TIMES DAILY WITH MEALS
Qty: 180 TABLET | Refills: 3 | Status: SHIPPED | OUTPATIENT
Start: 2023-01-19 | End: 2023-01-19

## 2023-01-19 RX ORDER — METOPROLOL SUCCINATE 25 MG/1
25 TABLET, EXTENDED RELEASE ORAL DAILY
Qty: 90 TABLET | Refills: 3 | Status: SHIPPED | OUTPATIENT
Start: 2023-01-19 | End: 2024-02-02 | Stop reason: SDUPTHER

## 2023-01-19 RX ORDER — AMLODIPINE BESYLATE 5 MG/1
5 TABLET ORAL DAILY
Qty: 90 TABLET | Refills: 1 | Status: SHIPPED | OUTPATIENT
Start: 2023-01-19 | End: 2023-11-06 | Stop reason: SDUPTHER

## 2023-01-19 NOTE — TELEPHONE ENCOUNTER
Relayed this message to the patient and she is now agreeable to changing her BP medicine. Pt verbalized understanding to stop amlodipine and metoprolol and start coreg .She is concerned that her BP will not be controlled with this change. She will monitor and let us know if it is consistently greater than 140/90.

## 2023-01-19 NOTE — TELEPHONE ENCOUNTER
She actually doesn't want to make any changes. To the statin or BP meds. I explained the interaction puts the patient at increased risk for muscle injury. However the patient stated that she feels fine and would prefer not to make any changes.

## 2023-01-19 NOTE — TELEPHONE ENCOUNTER
Pt called to speak with Keren regarding previous messages..    Pt wants to know if she can leave the BP meds the same and change the cholesterol medication instead      P; [phone]

## 2023-03-22 ENCOUNTER — TELEPHONE (OUTPATIENT)
Dept: CARDIOLOGY | Facility: CLINIC | Age: 73
End: 2023-03-22
Payer: MEDICARE

## 2023-03-22 NOTE — TELEPHONE ENCOUNTER
Voicemail was left asking the patient to return my call regarding and fax received by our office regarding a medication interaction between simvastatin and amlodipine. Our records show that she is on Amlodipine and Atorvastatin. I would like to confirm she is still taking these meds.

## 2023-04-16 NOTE — TELEPHONE ENCOUNTER
Medicine Refill Request    Last Office: 6/13/2022   Last Consult Visit: Visit date not found  Last Telemedicine Visit: Visit date not found    Next Appointment: Visit date not found      Current Outpatient Medications:   •  ALPRAZolam (XANAX) 0.25 mg tablet, TAKE 1 TABLET BY MOUTH 2 TIMES A DAY AS NEEDED FOR ANXIETY OR SLEEP., Disp: 60 tablet, Rfl: 1  •  amLODIPine (NORVASC) 5 mg tablet, Take 1 tablet (5 mg total) by mouth daily., Disp: 90 tablet, Rfl: 1  •  atorvastatin (LIPITOR) 40 mg tablet, Take 1 tablet (40 mg total) by mouth daily., Disp: 90 tablet, Rfl: 1  •  cholecalciferol, vitamin D3, 1,000 unit (25 mcg) tablet, take 2 tablets daily, Disp: , Rfl:   •  fosinopriL (MONOPRIL) 40 mg tablet, TAKE 1 TABLET BY MOUTH EVERY DAY, Disp: 90 tablet, Rfl: 3  •  hydrochlorothiazide (HYDRODIURIL) 25 mg tablet, TAKE 1 TABLET BY MOUTH EVERY DAY, Disp: 90 tablet, Rfl: 3  •  metFORMIN (GLUCOPHAGE) 1,000 mg tablet, Take 0.5 tablets (500 mg total) by mouth 2 (two) times a day with meals., Disp: 180 tablet, Rfl: 0  •  metoprolol succinate XL (TOPROL-XL) 25 mg 24 hr tablet, Take 1 tablet (25 mg total) by mouth daily., Disp: 90 tablet, Rfl: 3      BP Readings from Last 3 Encounters:   06/13/22 (!) 156/80   05/10/22 134/74   05/05/22 126/74       Recent Lab results:  Lab Results   Component Value Date    CHOL 131 10/05/2021   ,   Lab Results   Component Value Date    HDL 41 10/05/2021   ,   Lab Results   Component Value Date    LDLCALC 51 10/05/2021   ,   Lab Results   Component Value Date    TRIG 251 (H) 10/05/2021        Lab Results   Component Value Date    GLUCOSE 102 (H) 05/10/2022   ,   Lab Results   Component Value Date    HGBA1C 6.7 (H) 05/10/2022         Lab Results   Component Value Date    CREATININE 1.43 (H) 05/10/2022       Lab Results   Component Value Date    TSH 3.360 07/16/2016

## 2023-04-17 RX ORDER — FOSINOPRIL SODIUM 40 MG/1
40 TABLET ORAL DAILY
Qty: 90 TABLET | Refills: 0 | Status: SHIPPED | OUTPATIENT
Start: 2023-04-17 | End: 2023-07-12

## 2023-04-17 RX ORDER — ALPRAZOLAM 0.25 MG/1
TABLET ORAL
Qty: 60 TABLET | Refills: 1 | Status: SHIPPED | OUTPATIENT
Start: 2023-04-17 | End: 2023-08-14

## 2023-04-17 NOTE — TELEPHONE ENCOUNTER
Please let patient know prescription refilled but she is due in for an office visit in May or June for medication check.  Patient last seen in June 2022 by Dr. Mills.

## 2023-05-08 RX ORDER — METFORMIN HYDROCHLORIDE 1000 MG/1
500 TABLET ORAL 2 TIMES DAILY WITH MEALS
Qty: 180 TABLET | Refills: 0 | Status: SHIPPED | OUTPATIENT
Start: 2023-05-08 | End: 2023-06-02 | Stop reason: SINTOL

## 2023-05-08 NOTE — TELEPHONE ENCOUNTER
Refill request received.  Patient was last seen by Dr. Mills in June 2022.  Patient needs to make an appointment with me preferably this month as she is overdue for med check and blood work.  Please make it a 30-minute appointment as patient is new to me.  (Not a physical needs office visit for med check)

## 2023-05-08 NOTE — TELEPHONE ENCOUNTER
Medicine Refill Request    Last Office: 6/13/2022   Last Consult Visit: Visit date not found  Last Telemedicine Visit: Visit date not found    Next Appointment: Visit date not found      Current Outpatient Medications:   •  ALPRAZolam (XANAX) 0.25 mg tablet, TAKE 1 TABLET BY MOUTH TWICE A DAY AS NEEDED FOR ANXIETY OR SLEEP, Disp: 60 tablet, Rfl: 1  •  amLODIPine (NORVASC) 5 mg tablet, Take 1 tablet (5 mg total) by mouth daily., Disp: 90 tablet, Rfl: 1  •  atorvastatin (LIPITOR) 40 mg tablet, Take 1 tablet (40 mg total) by mouth daily., Disp: 90 tablet, Rfl: 1  •  cholecalciferol, vitamin D3, 1,000 unit (25 mcg) tablet, take 2 tablets daily, Disp: , Rfl:   •  fosinopriL (MONOPRIL) 40 mg tablet, Take 1 tablet (40 mg total) by mouth daily., Disp: 90 tablet, Rfl: 0  •  hydrochlorothiazide (HYDRODIURIL) 25 mg tablet, TAKE 1 TABLET BY MOUTH EVERY DAY, Disp: 90 tablet, Rfl: 3  •  metFORMIN (GLUCOPHAGE) 1,000 mg tablet, Take 0.5 tablets (500 mg total) by mouth 2 (two) times a day with meals., Disp: 180 tablet, Rfl: 0  •  metoprolol succinate XL (TOPROL-XL) 25 mg 24 hr tablet, Take 1 tablet (25 mg total) by mouth daily., Disp: 90 tablet, Rfl: 3      BP Readings from Last 3 Encounters:   06/13/22 (!) 156/80   05/10/22 134/74   05/05/22 126/74       Recent Lab results:  Lab Results   Component Value Date    CHOL 131 10/05/2021   ,   Lab Results   Component Value Date    HDL 41 10/05/2021   ,   Lab Results   Component Value Date    LDLCALC 51 10/05/2021   ,   Lab Results   Component Value Date    TRIG 251 (H) 10/05/2021        Lab Results   Component Value Date    GLUCOSE 102 (H) 05/10/2022   ,   Lab Results   Component Value Date    HGBA1C 6.7 (H) 05/10/2022         Lab Results   Component Value Date    CREATININE 1.43 (H) 05/10/2022       Lab Results   Component Value Date    TSH 3.360 07/16/2016

## 2023-05-15 RX ORDER — HYDROCHLOROTHIAZIDE 25 MG/1
25 TABLET ORAL DAILY
Qty: 90 TABLET | Refills: 0 | Status: SHIPPED | OUTPATIENT
Start: 2023-05-15 | End: 2023-12-15

## 2023-05-15 NOTE — TELEPHONE ENCOUNTER
Medicine Refill Request    Last Office: 6/13/2022   Last Consult Visit: Visit date not found  Last Telemedicine Visit: Visit date not found    Next Appointment: 5/25/2023      Current Outpatient Medications:   •  ALPRAZolam (XANAX) 0.25 mg tablet, TAKE 1 TABLET BY MOUTH TWICE A DAY AS NEEDED FOR ANXIETY OR SLEEP, Disp: 60 tablet, Rfl: 1  •  amLODIPine (NORVASC) 5 mg tablet, Take 1 tablet (5 mg total) by mouth daily., Disp: 90 tablet, Rfl: 1  •  atorvastatin (LIPITOR) 40 mg tablet, Take 1 tablet (40 mg total) by mouth daily., Disp: 90 tablet, Rfl: 1  •  cholecalciferol, vitamin D3, 1,000 unit (25 mcg) tablet, take 2 tablets daily, Disp: , Rfl:   •  fosinopriL (MONOPRIL) 40 mg tablet, Take 1 tablet (40 mg total) by mouth daily., Disp: 90 tablet, Rfl: 0  •  hydrochlorothiazide (HYDRODIURIL) 25 mg tablet, TAKE 1 TABLET BY MOUTH EVERY DAY, Disp: 90 tablet, Rfl: 3  •  metFORMIN (GLUCOPHAGE) 1,000 mg tablet, Take 0.5 tablets (500 mg total) by mouth 2 (two) times a day with meals., Disp: 180 tablet, Rfl: 0  •  metoprolol succinate XL (TOPROL-XL) 25 mg 24 hr tablet, Take 1 tablet (25 mg total) by mouth daily., Disp: 90 tablet, Rfl: 3      BP Readings from Last 3 Encounters:   06/13/22 (!) 156/80   05/10/22 134/74   05/05/22 126/74       Recent Lab results:  Lab Results   Component Value Date    CHOL 131 10/05/2021   ,   Lab Results   Component Value Date    HDL 41 10/05/2021   ,   Lab Results   Component Value Date    LDLCALC 51 10/05/2021   ,   Lab Results   Component Value Date    TRIG 251 (H) 10/05/2021        Lab Results   Component Value Date    GLUCOSE 102 (H) 05/10/2022   ,   Lab Results   Component Value Date    HGBA1C 6.7 (H) 05/10/2022         Lab Results   Component Value Date    CREATININE 1.43 (H) 05/10/2022       Lab Results   Component Value Date    TSH 3.360 07/16/2016

## 2023-05-25 ENCOUNTER — OFFICE VISIT (OUTPATIENT)
Dept: FAMILY MEDICINE | Facility: CLINIC | Age: 73
End: 2023-05-25
Payer: MEDICARE

## 2023-05-25 VITALS
SYSTOLIC BLOOD PRESSURE: 120 MMHG | BODY MASS INDEX: 24.16 KG/M2 | HEIGHT: 65 IN | WEIGHT: 145 LBS | RESPIRATION RATE: 14 BRPM | DIASTOLIC BLOOD PRESSURE: 80 MMHG | HEART RATE: 72 BPM

## 2023-05-25 DIAGNOSIS — N18.31 STAGE 3A CHRONIC KIDNEY DISEASE (CMS/HCC): ICD-10-CM

## 2023-05-25 DIAGNOSIS — E78.2 MIXED HYPERLIPIDEMIA: ICD-10-CM

## 2023-05-25 DIAGNOSIS — Z12.11 COLON CANCER SCREENING: ICD-10-CM

## 2023-05-25 DIAGNOSIS — F51.04 PSYCHOPHYSIOLOGICAL INSOMNIA: ICD-10-CM

## 2023-05-25 DIAGNOSIS — I10 BENIGN ESSENTIAL HYPERTENSION: Primary | ICD-10-CM

## 2023-05-25 DIAGNOSIS — E11.69 TYPE 2 DIABETES MELLITUS WITH OTHER SPECIFIED COMPLICATION, WITHOUT LONG-TERM CURRENT USE OF INSULIN: ICD-10-CM

## 2023-05-25 PROBLEM — K44.9 HIATAL HERNIA: Status: ACTIVE | Noted: 2023-05-25

## 2023-05-25 PROBLEM — N30.01 ACUTE CYSTITIS WITH HEMATURIA: Status: RESOLVED | Noted: 2022-06-13 | Resolved: 2023-05-25

## 2023-05-25 PROBLEM — Z23 IMMUNIZATION DUE: Status: RESOLVED | Noted: 2018-07-03 | Resolved: 2023-05-25

## 2023-05-25 PROBLEM — R13.10 DYSPHAGIA: Status: RESOLVED | Noted: 2022-05-10 | Resolved: 2023-05-25

## 2023-05-25 PROBLEM — Z12.31 SCREENING MAMMOGRAM, ENCOUNTER FOR: Status: RESOLVED | Noted: 2021-10-05 | Resolved: 2023-05-25

## 2023-05-25 PROBLEM — Z00.00 MEDICARE ANNUAL WELLNESS VISIT, SUBSEQUENT: Status: RESOLVED | Noted: 2018-07-03 | Resolved: 2023-05-25

## 2023-05-25 PROCEDURE — G8754 DIAS BP LESS 90: HCPCS | Performed by: FAMILY MEDICINE

## 2023-05-25 PROCEDURE — G8752 SYS BP LESS 140: HCPCS | Performed by: FAMILY MEDICINE

## 2023-05-25 PROCEDURE — 99214 OFFICE O/P EST MOD 30 MIN: CPT | Performed by: FAMILY MEDICINE

## 2023-05-25 PROCEDURE — 36415 COLL VENOUS BLD VENIPUNCTURE: CPT | Performed by: FAMILY MEDICINE

## 2023-05-25 ASSESSMENT — ENCOUNTER SYMPTOMS
ACTIVITY CHANGE: 0
FATIGUE: 0
UNEXPECTED WEIGHT CHANGE: 0
POLYPHAGIA: 0
POLYDIPSIA: 0

## 2023-05-25 ASSESSMENT — PATIENT HEALTH QUESTIONNAIRE - PHQ9: SUM OF ALL RESPONSES TO PHQ9 QUESTIONS 1 & 2: 0

## 2023-05-25 NOTE — PROGRESS NOTES
Subjective      Patient ID: Vianney Bone is a 72 y.o. female.    Patient here for follow-up on chronic conditions which include diabetes and hypertension.  Patient transitioning from Dr. Mills.  Last office visit in our office was 11 months ago.  Patient also sees cardiology.  Feeling okay.  Lost son in 40s with cerebral hemorrhage and daughter also has cerebral hemorrhage       Tobacco  Problems  Med Hx  Surg Hx  Fam Hx       Review of Systems   Constitutional: Negative for activity change, fatigue and unexpected weight change.   Eyes: Negative for visual disturbance.   Respiratory: Negative for cough, chest tightness and shortness of breath.    Cardiovascular: Negative for chest pain, palpitations and leg swelling.   Endocrine: Negative for polydipsia, polyphagia and polyuria.   Neurological: Negative for weakness, numbness and headaches.   Psychiatric/Behavioral: Positive for sleep disturbance.     No Known Allergies  Current Outpatient Medications   Medication Sig Dispense Refill   • ALPRAZolam (XANAX) 0.25 mg tablet TAKE 1 TABLET BY MOUTH TWICE A DAY AS NEEDED FOR ANXIETY OR SLEEP 60 tablet 1   • amLODIPine (NORVASC) 5 mg tablet Take 1 tablet (5 mg total) by mouth daily. 90 tablet 1   • atorvastatin (LIPITOR) 40 mg tablet Take 1 tablet (40 mg total) by mouth daily. 90 tablet 1   • cholecalciferol, vitamin D3, 1,000 unit (25 mcg) tablet take 2 tablets daily     • fosinopriL (MONOPRIL) 40 mg tablet Take 1 tablet (40 mg total) by mouth daily. 90 tablet 0   • hydrochlorothiazide (HYDRODIURIL) 25 mg tablet Take 1 tablet (25 mg total) by mouth daily. 90 tablet 0   • metFORMIN (GLUCOPHAGE) 1,000 mg tablet Take 0.5 tablets (500 mg total) by mouth 2 (two) times a day with meals. 180 tablet 0   • metoprolol succinate XL (TOPROL-XL) 25 mg 24 hr tablet Take 1 tablet (25 mg total) by mouth daily. 90 tablet 3     No current facility-administered medications for this visit.     Past Medical History:   Diagnosis Date  "  • Diabetes mellitus (CMS/HCC) 1/3/2011    Overview:    • Hypertension    • Mixed hyperlipidemia 1/3/2011    Overview:    • Squamous cell cancer of skin of nose 6/27/2018     Past Surgical History:   Procedure Laterality Date   • SQUAMOUS CELL CARCINOMA EXCISION       Social History     Tobacco Use   • Smoking status: Never   • Smokeless tobacco: Never   Substance Use Topics   • Alcohol use: No     Family History   Problem Relation Age of Onset   • Hypertension Biological Mother    • Heart disease Biological Father    • Stroke Biological Father    • Heart disease Biological Brother    • Ovarian cancer Biological Sister    • Bipolar disorder Biological Sister    • Rheum arthritis Biological Sister    • Aneurysm Biological Son        Objective   Vitals:    05/25/23 1021   BP: 120/80   Pulse: 72   Resp: 14   Weight: 65.8 kg (145 lb)   Height: 1.638 m (5' 4.5\")    Body mass index is 24.5 kg/m².  Physical Exam  Constitutional:       General: She is not in acute distress.     Appearance: Normal appearance. She is well-developed and normal weight.   Cardiovascular:      Rate and Rhythm: Normal rate and regular rhythm.      Heart sounds: Normal heart sounds, S1 normal and S2 normal. No murmur heard.  Pulmonary:      Effort: Pulmonary effort is normal. No respiratory distress.      Breath sounds: Normal breath sounds. No decreased breath sounds, wheezing, rhonchi or rales.   Abdominal:      General: Bowel sounds are normal. There is no distension.      Palpations: Abdomen is soft. There is no mass.      Tenderness: There is no abdominal tenderness.   Musculoskeletal:      Right lower leg: No edema.      Left lower leg: No edema.   Neurological:      Mental Status: She is alert.         Assessment/Plan   Problem List Items Addressed This Visit        Nervous    Psychophysiological insomnia       Circulatory    Benign essential hypertension - Primary       Genitourinary    Stage 3 chronic kidney disease (CMS/HCC)    Relevant " Orders    Comprehensive metabolic panel (Completed)       Endocrine/Metabolic    Type 2 diabetes mellitus with other specified complication (CMS/HCC)    Relevant Orders    Comprehensive metabolic panel (Completed)    Hemoglobin A1c (Completed)       Other    Mixed hyperlipidemia    Relevant Orders    Lipid panel (Completed)    Comprehensive metabolic panel (Completed)   Other Visit Diagnoses     Colon cancer screening          Reviewed over chronic conditions.  We will adjust medicines if needed by blood work but recommend patient follow-up more closely in either 3 to 6 months.  Due for blood work follow-up for other issues as well as diabetes.  Patient to continue his Xanax for insomnia.  Reviewed safe usage.  Also recommend patient get colon cancer screening.  Patient also due for diabetic eye exam.    Patient agrees and verbalizes understanding of medication and treatment plan discussed at today's visit.  Patient was instructed to call or follow-up if symptoms persist or worsen.         Monique Mcdonald, DO    This note was created with voice recognition software. Inadvertent dictation errors should be disregarded. Please contact my office for clarification.

## 2023-05-26 LAB
ALBUMIN SERPL-MCNC: 4.9 G/DL (ref 3.7–4.7)
ALBUMIN/GLOB SERPL: 1.9 {RATIO} (ref 1.2–2.2)
ALP SERPL-CCNC: 57 IU/L (ref 44–121)
ALT SERPL-CCNC: 15 IU/L (ref 0–32)
AST SERPL-CCNC: 19 IU/L (ref 0–40)
BILIRUB SERPL-MCNC: 0.7 MG/DL (ref 0–1.2)
BUN SERPL-MCNC: 17 MG/DL (ref 8–27)
BUN/CREAT SERPL: 10 (ref 12–28)
CALCIUM SERPL-MCNC: 10 MG/DL (ref 8.7–10.3)
CHLORIDE SERPL-SCNC: 100 MMOL/L (ref 96–106)
CHOLEST SERPL-MCNC: 163 MG/DL (ref 100–199)
CO2 SERPL-SCNC: 22 MMOL/L (ref 20–29)
CREAT SERPL-MCNC: 1.63 MG/DL (ref 0.57–1)
EGFRCR SERPLBLD CKD-EPI 2021: 34 ML/MIN/1.73
GLOBULIN SER CALC-MCNC: 2.6 G/DL (ref 1.5–4.5)
GLUCOSE SERPL-MCNC: 122 MG/DL (ref 70–99)
HBA1C MFR BLD: 7 % (ref 4.8–5.6)
HDLC SERPL-MCNC: 46 MG/DL
LDLC SERPL CALC-MCNC: 86 MG/DL (ref 0–99)
POTASSIUM SERPL-SCNC: 4.2 MMOL/L (ref 3.5–5.2)
PROT SERPL-MCNC: 7.5 G/DL (ref 6–8.5)
SODIUM SERPL-SCNC: 140 MMOL/L (ref 134–144)
TRIGL SERPL-MCNC: 179 MG/DL (ref 0–149)
VLDLC SERPL CALC-MCNC: 31 MG/DL (ref 5–40)

## 2023-05-26 NOTE — RESULT ENCOUNTER NOTE
Tommy Faith, I just gave you a call but I got your answering machine.  I am concerned about the kidney function.  I looked back and a year ago it was abnormal and Dr. Mills had wanted you to cut back on the metformin and recheck the blood work but I do not think that was done.  Unfortunately it is a little worse now.  I need to either decrease or change out your metformin to something else and see if that is the reason.  So I do need to discuss it with you.  We can send me a message back or call I will be in the office starting Tuesday afternoon and the rest of the week.  Monique Mcdonald

## 2023-05-29 ASSESSMENT — ENCOUNTER SYMPTOMS
SHORTNESS OF BREATH: 0
HEADACHES: 0
SLEEP DISTURBANCE: 1
PALPITATIONS: 0
COUGH: 0
NUMBNESS: 0
CHEST TIGHTNESS: 0
WEAKNESS: 0

## 2023-05-30 ENCOUNTER — TELEPHONE (OUTPATIENT)
Dept: FAMILY MEDICINE | Facility: CLINIC | Age: 73
End: 2023-05-30
Payer: MEDICARE

## 2023-05-30 DIAGNOSIS — E11.69 TYPE 2 DIABETES MELLITUS WITH OTHER SPECIFIED COMPLICATION, WITHOUT LONG-TERM CURRENT USE OF INSULIN: Primary | ICD-10-CM

## 2023-06-01 RX ORDER — SAXAGLIPTIN 2.5 MG/1
2.5 TABLET, FILM COATED ORAL DAILY
Qty: 90 TABLET | Refills: 0 | Status: SHIPPED | OUTPATIENT
Start: 2023-06-01 | End: 2023-08-25 | Stop reason: SDUPTHER

## 2023-06-02 ENCOUNTER — TELEPHONE (OUTPATIENT)
Dept: FAMILY MEDICINE | Facility: CLINIC | Age: 73
End: 2023-06-02
Payer: MEDICARE

## 2023-06-02 DIAGNOSIS — E11.69 TYPE 2 DIABETES MELLITUS WITH OTHER SPECIFIED COMPLICATION, WITHOUT LONG-TERM CURRENT USE OF INSULIN: Primary | ICD-10-CM

## 2023-06-02 RX ORDER — GLIPIZIDE 2.5 MG/1
2.5 TABLET, EXTENDED RELEASE ORAL
Qty: 90 TABLET | Refills: 1 | Status: SHIPPED | OUTPATIENT
Start: 2023-06-02 | End: 2023-11-17

## 2023-06-02 NOTE — TELEPHONE ENCOUNTER
[facility] sent a fax that Onglyza is not a covered medication on her plan and they are requesting that you change the medication.

## 2023-06-05 ENCOUNTER — TELEPHONE (OUTPATIENT)
Dept: FAMILY MEDICINE | Facility: CLINIC | Age: 73
End: 2023-06-05
Payer: MEDICARE

## 2023-06-05 NOTE — TELEPHONE ENCOUNTER
Fax received from [employer]. Onglyza is not covered by pt's insurance.  Please send suggested alternative: Januvia 50 mg.

## 2023-07-12 RX ORDER — ATORVASTATIN CALCIUM 40 MG/1
40 TABLET, FILM COATED ORAL DAILY
Qty: 90 TABLET | Refills: 1 | OUTPATIENT
Start: 2023-07-12

## 2023-07-12 RX ORDER — AMLODIPINE BESYLATE 5 MG/1
5 TABLET ORAL DAILY
Qty: 90 TABLET | Refills: 1 | OUTPATIENT
Start: 2023-07-12 | End: 2024-01-08

## 2023-07-12 NOTE — TELEPHONE ENCOUNTER
Rx request for amlodipine 5mg and atorvastatin 40mg received from [facility]. Pt has not been seen in over 1 yr, no f/u appt scheduled. Request denied.     Medicine Refill Request    Last Office: 5/5/2022   Last Consult Visit: Visit date not found  Last Telemedicine Visit: Visit date not found    Next Appointment: Visit date not found      Current Outpatient Medications:   •  ALPRAZolam (XANAX) 0.25 mg tablet, TAKE 1 TABLET BY MOUTH TWICE A DAY AS NEEDED FOR ANXIETY OR SLEEP, Disp: 60 tablet, Rfl: 1  •  amLODIPine (NORVASC) 5 mg tablet, Take 1 tablet (5 mg total) by mouth daily., Disp: 90 tablet, Rfl: 1  •  atorvastatin (LIPITOR) 40 mg tablet, Take 1 tablet (40 mg total) by mouth daily., Disp: 90 tablet, Rfl: 1  •  cholecalciferol, vitamin D3, 1,000 unit (25 mcg) tablet, take 2 tablets daily, Disp: , Rfl:   •  fosinopriL (MONOPRIL) 40 mg tablet, Take 1 tablet (40 mg total) by mouth daily., Disp: 90 tablet, Rfl: 0  •  glipiZIDE (GLUCOTROL XL) 2.5 mg 24 hr tablet, Take 1 tablet (2.5 mg total) by mouth daily with breakfast., Disp: 90 tablet, Rfl: 1  •  hydrochlorothiazide (HYDRODIURIL) 25 mg tablet, Take 1 tablet (25 mg total) by mouth daily., Disp: 90 tablet, Rfl: 0  •  metoprolol succinate XL (TOPROL-XL) 25 mg 24 hr tablet, Take 1 tablet (25 mg total) by mouth daily., Disp: 90 tablet, Rfl: 3  •  sAXagliptin (ONGLYZA) 2.5 mg tablet, Take 1 tablet (2.5 mg total) by mouth daily., Disp: 90 tablet, Rfl: 0      BP Readings from Last 3 Encounters:   05/25/23 120/80   06/13/22 (!) 156/80   05/10/22 134/74       Recent Lab results:  Lab Results   Component Value Date    CHOL 163 05/25/2023   ,   Lab Results   Component Value Date    HDL 46 05/25/2023   ,   Lab Results   Component Value Date    LDLCALC 86 05/25/2023   ,   Lab Results   Component Value Date    TRIG 179 (H) 05/25/2023        Lab Results   Component Value Date    GLUCOSE 122 (H) 05/25/2023   ,   Lab Results   Component Value Date    HGBA1C 7.0 (H)  05/25/2023         Lab Results   Component Value Date    CREATININE 1.63 (H) 05/25/2023       Lab Results   Component Value Date    TSH 3.360 07/16/2016

## 2023-08-28 ENCOUNTER — TELEPHONE (OUTPATIENT)
Dept: FAMILY MEDICINE | Facility: CLINIC | Age: 73
End: 2023-08-28
Payer: MEDICARE

## 2023-08-28 DIAGNOSIS — E11.69 TYPE 2 DIABETES MELLITUS WITH OTHER SPECIFIED COMPLICATION, WITHOUT LONG-TERM CURRENT USE OF INSULIN: Primary | ICD-10-CM

## 2023-08-28 NOTE — TELEPHONE ENCOUNTER
Saxagliptin 5 mg is non-formulary with insurance.  Patient must first try Januvia 100 mg or Tradjenta 5 mg.

## 2023-08-31 ENCOUNTER — APPOINTMENT (OUTPATIENT)
Dept: URBAN - METROPOLITAN AREA CLINIC 203 | Age: 73
Setting detail: DERMATOLOGY
End: 2023-09-04

## 2023-08-31 DIAGNOSIS — L57.8 OTHER SKIN CHANGES DUE TO CHRONIC EXPOSURE TO NONIONIZING RADIATION: ICD-10-CM

## 2023-08-31 DIAGNOSIS — D18.0 HEMANGIOMA: ICD-10-CM

## 2023-08-31 DIAGNOSIS — Z85.828 PERSONAL HISTORY OF OTHER MALIGNANT NEOPLASM OF SKIN: ICD-10-CM

## 2023-08-31 DIAGNOSIS — L82.1 OTHER SEBORRHEIC KERATOSIS: ICD-10-CM

## 2023-08-31 DIAGNOSIS — L56.5 DISSEMINATED SUPERFICIAL ACTINIC POROKERATOSIS (DSAP): ICD-10-CM

## 2023-08-31 DIAGNOSIS — L70.8 OTHER ACNE: ICD-10-CM

## 2023-08-31 PROBLEM — D18.01 HEMANGIOMA OF SKIN AND SUBCUTANEOUS TISSUE: Status: ACTIVE | Noted: 2023-08-31

## 2023-08-31 PROCEDURE — OTHER SUNSCREEN RECOMMENDATIONS: OTHER

## 2023-08-31 PROCEDURE — OTHER COUNSELING: OTHER

## 2023-08-31 PROCEDURE — 99213 OFFICE O/P EST LOW 20 MIN: CPT

## 2023-08-31 PROCEDURE — OTHER REASSURANCE: OTHER

## 2023-08-31 ASSESSMENT — LOCATION ZONE DERM
LOCATION ZONE: LEG
LOCATION ZONE: LEG
LOCATION ZONE: TRUNK
LOCATION ZONE: FACE

## 2023-08-31 ASSESSMENT — LOCATION SIMPLE DESCRIPTION DERM
LOCATION SIMPLE: LEFT CHEEK
LOCATION SIMPLE: RIGHT PRETIBIAL REGION
LOCATION SIMPLE: ABDOMEN
LOCATION SIMPLE: LEFT BREAST
LOCATION SIMPLE: RIGHT THIGH
LOCATION SIMPLE: RIGHT THIGH

## 2023-08-31 ASSESSMENT — LOCATION DETAILED DESCRIPTION DERM
LOCATION DETAILED: RIGHT ANTERIOR PROXIMAL THIGH
LOCATION DETAILED: RIGHT ANTERIOR DISTAL THIGH
LOCATION DETAILED: LEFT MEDIAL BREAST 10-11:00 REGION
LOCATION DETAILED: RIGHT MEDIAL DISTAL PRETIBIAL REGION
LOCATION DETAILED: LEFT INFERIOR PREAURICULAR CHEEK
LOCATION DETAILED: LEFT MEDIAL BREAST 11-12:00 REGION
LOCATION DETAILED: PERIUMBILICAL SKIN

## 2023-08-31 ASSESSMENT — PAIN INTENSITY VAS: HOW INTENSE IS YOUR PAIN 0 BEING NO PAIN, 10 BEING THE MOST SEVERE PAIN POSSIBLE?: NO PAIN

## 2023-10-04 RX ORDER — ATORVASTATIN CALCIUM 40 MG/1
40 TABLET, FILM COATED ORAL DAILY
Qty: 90 TABLET | Refills: 1 | OUTPATIENT
Start: 2023-10-04

## 2023-10-06 ENCOUNTER — TELEPHONE (OUTPATIENT)
Dept: FAMILY MEDICINE | Facility: CLINIC | Age: 73
End: 2023-10-06
Payer: MEDICARE

## 2023-10-06 NOTE — TELEPHONE ENCOUNTER
Spoke with patient. The medication was denied by the cardiologist office. Patient will call, and schedule with them.

## 2023-10-06 NOTE — TELEPHONE ENCOUNTER
Patient got a message stating she needed to make an OV in order for her medication to be filled (atorvastatin (LIPITOR) 40 mg tablet.)  Patient made first available OV she is available for.    Ms. Bone is requesting a CB from the nurse. She is confused about her medication as her cardiologist changed her over to this medication and she is getting conflicting information.

## 2023-10-16 ENCOUNTER — OFFICE VISIT (OUTPATIENT)
Dept: CARDIOLOGY | Facility: CLINIC | Age: 73
End: 2023-10-16
Payer: MEDICARE

## 2023-10-16 VITALS
WEIGHT: 156 LBS | SYSTOLIC BLOOD PRESSURE: 110 MMHG | HEIGHT: 65 IN | BODY MASS INDEX: 25.99 KG/M2 | OXYGEN SATURATION: 96 % | DIASTOLIC BLOOD PRESSURE: 72 MMHG | RESPIRATION RATE: 16 BRPM | HEART RATE: 63 BPM

## 2023-10-16 DIAGNOSIS — E78.2 MIXED HYPERLIPIDEMIA: ICD-10-CM

## 2023-10-16 DIAGNOSIS — Z87.59 HISTORY OF PRE-ECLAMPSIA: ICD-10-CM

## 2023-10-16 DIAGNOSIS — E11.69 TYPE 2 DIABETES MELLITUS WITH OTHER SPECIFIED COMPLICATION, WITHOUT LONG-TERM CURRENT USE OF INSULIN: ICD-10-CM

## 2023-10-16 DIAGNOSIS — N18.31 STAGE 3A CHRONIC KIDNEY DISEASE (CMS/HCC): ICD-10-CM

## 2023-10-16 DIAGNOSIS — I10 BENIGN ESSENTIAL HYPERTENSION: Primary | ICD-10-CM

## 2023-10-16 PROCEDURE — G8754 DIAS BP LESS 90: HCPCS | Performed by: STUDENT IN AN ORGANIZED HEALTH CARE EDUCATION/TRAINING PROGRAM

## 2023-10-16 PROCEDURE — 93000 ELECTROCARDIOGRAM COMPLETE: CPT | Performed by: STUDENT IN AN ORGANIZED HEALTH CARE EDUCATION/TRAINING PROGRAM

## 2023-10-16 PROCEDURE — 99214 OFFICE O/P EST MOD 30 MIN: CPT | Performed by: STUDENT IN AN ORGANIZED HEALTH CARE EDUCATION/TRAINING PROGRAM

## 2023-10-16 PROCEDURE — G8752 SYS BP LESS 140: HCPCS | Performed by: STUDENT IN AN ORGANIZED HEALTH CARE EDUCATION/TRAINING PROGRAM

## 2023-10-16 RX ORDER — ATORVASTATIN CALCIUM 40 MG/1
40 TABLET, FILM COATED ORAL DAILY
Qty: 90 TABLET | Refills: 3 | Status: SHIPPED | OUTPATIENT
Start: 2023-10-16 | End: 2024-09-30

## 2023-10-16 NOTE — ASSESSMENT & PLAN NOTE
5/26/2023 lipid panel: Total cholesterol 163 triglycerides 179 HDL 46 LDL 86  10/5/2021 lipid panel: Total cholesterol 131 triglycerides 251 HDL 41 LDL 51  --Continue atorvastatin, have given her prescription for repeat fasting blood work to be repeated in the spring

## 2023-10-16 NOTE — ASSESSMENT & PLAN NOTE
Blood pressure in the office today acceptable measuring 110/72 mmHg  --Continue metoprolol succinate 25 mg daily, amlodipine 5 mg, hydrochlorothiazide 25 mg daily, and fosinopril 40 mg daily

## 2023-10-16 NOTE — ASSESSMENT & PLAN NOTE
5/26/2023 hemoglobin A1c 7.0%  -- She is an appoint with her primary care doctor later this week to discuss diabetic medications.  -- Continue rosuvastatin 40 mg daily

## 2023-10-16 NOTE — PROGRESS NOTES
Minnie Renee,   Cardiology    [facility]  [facility] at [address] [address]    [phone]    [facility]     10/16/2023     Reason for visit: Cardiovascular follow-up    Vianney Bone is a 72 y.o. female who presents for cardiovascular follow-up. Vianney has a history of hypertension, hyperlipidemia, and diabetes mellitus type 2. I last saw her in the office in May of this year at which time she was feeling well physically.  Recommended continuations of her current cardiac medications other than her rosuvastatin which she reported to be very expensive at the pharmacy.  Instead switched her to atorvastatin.    She feels well other than recent weight gain and uncontrolled blood sugars at home.  Her metformin was discontinued due to renal issues, and she was started on glipizide.  She notes that since then she has gained approximately 10 pounds.  She was then prescribed Jardiance, but was unable to pick it up at this pharmacy due to high cost. She denies chest discomfort, shortness of breath, PND, lower extremity edema, presyncope, syncope.      She is going to [place] in 2 weeks with her daughter to visit her grandson he who is studying abroad.    Past Medical History:  1. Hypertension  2. Preeclampsia, term  3. Diabetes mellitus, type II  4. Hyperlipidemia  5. CKD  6. Insomnia   7. Squamous cell carcinoma  8. Obstetrics history     Past Surgical History:  1. Mohs surgery    Medications:   Current Outpatient Medications:     ALPRAZolam (XANAX) 0.25 mg tablet, TAKE 1 TABLET BY MOUTH TWICE A DAY AS NEEDED FOR ANXIETY OR SLEEP, Disp: 60 tablet, Rfl: 1    amLODIPine (NORVASC) 5 mg tablet, Take 1 tablet (5 mg total) by mouth daily., Disp: 90 tablet, Rfl: 1    atorvastatin (LIPITOR) 40 mg tablet, Take 1 tablet (40 mg total) by mouth daily., Disp: 90 tablet, Rfl: 3    cholecalciferol, vitamin D3, 1,000 unit (25 mcg) tablet, take 2 tablets daily, Disp: ,  Rfl:     fosinopriL (MONOPRIL) 40 mg tablet, TAKE 1 TABLET BY MOUTH EVERY DAY, Disp: 90 tablet, Rfl: 1    glipiZIDE (GLUCOTROL XL) 2.5 mg 24 hr tablet, Take 1 tablet (2.5 mg total) by mouth daily with breakfast., Disp: 90 tablet, Rfl: 1    hydrochlorothiazide (HYDRODIURIL) 25 mg tablet, Take 1 tablet (25 mg total) by mouth daily., Disp: 90 tablet, Rfl: 0    metoprolol succinate XL (TOPROL-XL) 25 mg 24 hr tablet, Take 1 tablet (25 mg total) by mouth daily., Disp: 90 tablet, Rfl: 3      Allergies: Patient has no known allergies.    Social History: Retired. Worked at [employer] for 28 years.  .  Never smoker.  Denies alcohol use.  Denies illicit drug use.    Family History: Reviewed and significant for father with coronary artery disease with bypass surgery and multiple strokes.  Brother with coronary artery disease and bypass surgery at the age of 65.  Almost all of her 8 siblings of hypertension.  Son with suspected brain aneurysm.    Exam:  Objective   Vitals:    10/16/23 1114   BP: 110/72   Pulse: 63   Resp: 16   SpO2: 96%     Body mass index is 26.36 kg/m².  Physical Exam  Constitutional:       Appearance: Normal appearance.   HENT:      Head: Normocephalic and atraumatic.   Eyes:      General: No scleral icterus.  Neck:      Vascular: No JVD.   Cardiovascular:      Rate and Rhythm: Normal rate and regular rhythm.      Heart sounds: No murmur heard.  Pulmonary:      Effort: Pulmonary effort is normal. No respiratory distress.      Breath sounds: Normal breath sounds. No wheezing, rhonchi or rales.   Abdominal:      General: There is no distension.      Palpations: Abdomen is soft.   Musculoskeletal:      Right lower leg: No edema.      Left lower leg: No edema.   Skin:     General: Skin is warm and dry.   Neurological:      Mental Status: She is alert and oriented to person, place, and time.   Psychiatric:         Mood and Affect: Mood normal.         Behavior: Behavior normal.         Labs:  Lab Results    Component Value Date    WBC 6.9 10/05/2021    HGB 11.7 10/05/2021     10/05/2021    CHOL 163 05/25/2023    TRIG 179 (H) 05/25/2023    HDL 46 05/25/2023    ALT 15 05/25/2023    AST 19 05/25/2023     05/25/2023    K 4.2 05/25/2023    CREATININE 1.63 (H) 05/25/2023    TSH 3.360 07/16/2016    HGBA1C 7.0 (H) 05/25/2023    MICROALBUR 13.5 10/05/2021   10/5/2021 lipid panel: Total cholesterol 131 triglycerides 251 HDL 41 LDL 51  5/26/2023 lipid panel: Total cholesterol 163 triglycerides 179 HDL 46 LDL 86  Personally reviewed, my interpretation: Hypertriglyceridemia, CKD    Cardiovascular Studies:   1. Echocardiogram 11/9/2021 (personally reviewed): Technically difficult study. Preserved LV systolic function. Mild concentric left ventricular hypertrophy. Estimated EF 65%. Wall motion appears grossly normal. Indeterminate diastolic function. Normal-sized RV. Normal RV systolic function. Normal-sized LA. Normal-sized RA. Tricuspid aortic valve. Sclerotic aortic valve leaflets. No aortic valve regurgitation. Thickened mitral valve leaflets. Mild mitral annular calcification. No appreciable mitral valve regurgitation. Trace tricuspid valve regurgitation. Estimated RVSP = 27 mmHg. Grossly normal pulmonic valve structure. Trace pulmonic valve regurgitation. Normal-sized IVC. IVC collapses >50% during inspiration. No significant pericardial effusion.  2. Stress echo 4/19/2012: Patient exercised for 7 minutes and 33 seconds.  No chest pain.  Stress EKG showed exaggeration of baseline ST depressions and new T wave inversions.  Stress echo showed no ischemia.  Resting echo showed normal left ventricular size and wall thickness.  LVEF was 65%.  Aortic valve trileaflet.  Trace mitral regurgitation and tricuspid regurgitation.  PA pressure 60 mmHg plus right atrial pressure.    ECG performed today and discussed with patient showing sinus rhythm with ST wave abnormalities unchanged from prior      Assessment/Plan   Problem  List Items Addressed This Visit        Circulatory    Benign essential hypertension - Primary     Blood pressure in the office today acceptable measuring 110/72 mmHg  --Continue metoprolol succinate 25 mg daily, amlodipine 5 mg, hydrochlorothiazide 25 mg daily, and fosinopril 40 mg daily         Relevant Orders    [facility] MUSE ECG 12 lead (clinic performed) (Completed)       Genitourinary    Stage 3 chronic kidney disease (CMS/[facility])     2023 creatinine 1.6            Endocrine/Metabolic    Type 2 diabetes mellitus with other specified complication (CMS/[facility])     2023 hemoglobin A1c 7.0%  -- She is an appoint with her primary care doctor later this week to discuss diabetic medications.  -- Continue rosuvastatin 40 mg daily            Other    Mixed hyperlipidemia     2023 lipid panel: Total cholesterol 163 triglycerides 179 HDL 46 LDL 86  10/5/2021 lipid panel: Total cholesterol 131 triglycerides 251 HDL 41 LDL 51  --Continue atorvastatin, have given her prescription for repeat fasting blood work to be repeated in the spring         Relevant Medications    atorvastatin (LIPITOR) 40 mg tablet    Other Relevant Orders    Lipid panel    Hepatic function panel    History of pre-eclampsia     Preeclampsia is a sex-specific cardiovascular risk factor and is associated with premature cardiovascular disease (CVD) including stroke, myocardial infarction, and heart failure with preserved ejection fraction.  --Reviewed that women with a history of adverse pregnancy outcome (APO), including hypertensive disorders of pregnancy (preeclampsia and gestational hypertension), gestational diabetes,  birth, and small for gestational age infant are at increased risk of future cardiovascular events and risk factor development.   --Plan for aggressive lifelong risk factor optimization: Lifestyle modification discussed, including the importance of heart healthy diet and regular aerobic exercise. Ensure adequate blood  pressure control with goal less than 130/80 mmHg. Plan for annual fasting glucose and lipid monitoring with target LDL less than 100.              Minnie Renee, DO    This letter was generated using speech recognition software. Please excuse any typographical errors.    Return in about 6 months (around 4/16/2024).

## 2023-10-19 ENCOUNTER — OFFICE VISIT (OUTPATIENT)
Dept: FAMILY MEDICINE | Facility: CLINIC | Age: 73
End: 2023-10-19
Payer: MEDICARE

## 2023-10-19 VITALS
BODY MASS INDEX: 25.92 KG/M2 | SYSTOLIC BLOOD PRESSURE: 120 MMHG | HEIGHT: 65 IN | DIASTOLIC BLOOD PRESSURE: 60 MMHG | RESPIRATION RATE: 16 BRPM | HEART RATE: 80 BPM | WEIGHT: 155.6 LBS

## 2023-10-19 DIAGNOSIS — F51.04 PSYCHOPHYSIOLOGICAL INSOMNIA: ICD-10-CM

## 2023-10-19 DIAGNOSIS — Z12.11 COLON CANCER SCREENING: ICD-10-CM

## 2023-10-19 DIAGNOSIS — N18.31 STAGE 3A CHRONIC KIDNEY DISEASE (CMS/HCC): ICD-10-CM

## 2023-10-19 DIAGNOSIS — I10 BENIGN ESSENTIAL HYPERTENSION: ICD-10-CM

## 2023-10-19 DIAGNOSIS — E11.69 TYPE 2 DIABETES MELLITUS WITH OTHER SPECIFIED COMPLICATION, WITHOUT LONG-TERM CURRENT USE OF INSULIN: Primary | ICD-10-CM

## 2023-10-19 DIAGNOSIS — E78.2 MIXED HYPERLIPIDEMIA: ICD-10-CM

## 2023-10-19 DIAGNOSIS — E55.9 VITAMIN D DEFICIENCY: ICD-10-CM

## 2023-10-19 PROCEDURE — G8754 DIAS BP LESS 90: HCPCS | Performed by: FAMILY MEDICINE

## 2023-10-19 PROCEDURE — G8752 SYS BP LESS 140: HCPCS | Performed by: FAMILY MEDICINE

## 2023-10-19 PROCEDURE — 99214 OFFICE O/P EST MOD 30 MIN: CPT | Performed by: FAMILY MEDICINE

## 2023-10-19 ASSESSMENT — ENCOUNTER SYMPTOMS
NUMBNESS: 0
UNEXPECTED WEIGHT CHANGE: 1
FATIGUE: 0
ACTIVITY CHANGE: 0
PALPITATIONS: 0
ABDOMINAL PAIN: 0
POLYPHAGIA: 0
SHORTNESS OF BREATH: 0
POLYDIPSIA: 0

## 2023-10-19 NOTE — PROGRESS NOTES
Subjective      Patient ID: Vianney Bone is a 72 y.o. female.    Patient here for follow-up on chronic conditions which include diabetes hypertension hyperlipidemia and insomnia.  Patient had evidence of chronic kidney disease and we had discontinued her metformin last visit and adjusted medicine.  Patient doing for follow-up today.  Walking 4 days a week. Diet same.   BS running higher in the 200s and has gained 10 pounds.       Tobacco  Allergies  Meds  Problems  Med Hx  Surg Hx  Fam Hx       Review of Systems   Constitutional: Positive for unexpected weight change. Negative for activity change and fatigue.   Eyes: Negative for visual disturbance.   Respiratory: Negative for shortness of breath.    Cardiovascular: Negative for chest pain, palpitations and leg swelling.   Gastrointestinal: Negative for abdominal pain.   Endocrine: Negative for polydipsia, polyphagia and polyuria.   Neurological: Negative for numbness.     No Known Allergies  Current Outpatient Medications   Medication Sig Dispense Refill    ALPRAZolam (XANAX) 0.25 mg tablet TAKE 1 TABLET BY MOUTH TWICE A DAY AS NEEDED FOR ANXIETY OR SLEEP 60 tablet 1    amLODIPine (NORVASC) 5 mg tablet Take 1 tablet (5 mg total) by mouth daily. 90 tablet 1    atorvastatin (LIPITOR) 40 mg tablet Take 1 tablet (40 mg total) by mouth daily. 90 tablet 3    cholecalciferol, vitamin D3, 1,000 unit (25 mcg) tablet take 2 tablets daily      fosinopriL (MONOPRIL) 40 mg tablet TAKE 1 TABLET BY MOUTH EVERY DAY 90 tablet 1    glipiZIDE (GLUCOTROL XL) 2.5 mg 24 hr tablet Take 1 tablet (2.5 mg total) by mouth daily with breakfast. 90 tablet 1    hydrochlorothiazide (HYDRODIURIL) 25 mg tablet Take 1 tablet (25 mg total) by mouth daily. 90 tablet 0    metoprolol succinate XL (TOPROL-XL) 25 mg 24 hr tablet Take 1 tablet (25 mg total) by mouth daily. 90 tablet 3     No current facility-administered medications for this visit.     Past Medical History:   Diagnosis  "Date    Diabetes mellitus (CMS/HCC) 1/3/2011    Overview:     Hypertension     Mixed hyperlipidemia 1/3/2011    Overview:     Squamous cell cancer of skin of nose 6/27/2018     Past Surgical History:   Procedure Laterality Date    SQUAMOUS CELL CARCINOMA EXCISION       Social History     Tobacco Use    Smoking status: Never    Smokeless tobacco: Never   Vaping Use    Vaping Use: Never used   Substance Use Topics    Alcohol use: No     Family History   Problem Relation Age of Onset    Hypertension Biological Mother     Heart disease Biological Father     Stroke Biological Father     Heart disease Biological Brother     Ovarian cancer Biological Sister     Bipolar disorder Biological Sister     Rheum arthritis Biological Sister     Aneurysm Biological Son        Objective   Vitals:    10/19/23 0903   BP: 120/60   Pulse: 80   Resp: 16   Weight: 70.6 kg (155 lb 9.6 oz)   Height: 1.638 m (5' 4.5\")    Body mass index is 26.3 kg/m².  Physical Exam  Constitutional:       General: She is not in acute distress.     Appearance: Normal appearance. She is well-developed and normal weight.   Cardiovascular:      Rate and Rhythm: Normal rate and regular rhythm.      Heart sounds: Normal heart sounds, S1 normal and S2 normal. No murmur heard.  Pulmonary:      Effort: Pulmonary effort is normal. No respiratory distress.      Breath sounds: Normal breath sounds. No decreased breath sounds, wheezing, rhonchi or rales.   Abdominal:      General: Bowel sounds are normal. There is no distension.      Palpations: Abdomen is soft. There is no mass.      Tenderness: There is no abdominal tenderness.   Musculoskeletal:      Right lower leg: No edema.      Left lower leg: No edema.   Neurological:      Mental Status: She is alert.         Assessment/Plan   Problem List Items Addressed This Visit        Nervous    Psychophysiological insomnia       Circulatory    Benign essential hypertension    Relevant Orders    " Comprehensive metabolic panel       Genitourinary    Stage 3 chronic kidney disease (CMS/HCC)       Endocrine/Metabolic    Type 2 diabetes mellitus with other specified complication (CMS/HCC) - Primary    Relevant Orders    Microalbumin/Creatinine Ur Random    Comprehensive metabolic panel    Hemoglobin A1c    Vitamin D deficiency    Relevant Orders    Vitamin D 25 hydroxy       Other    Mixed hyperlipidemia    Relevant Orders    CBC and Differential    Comprehensive metabolic panel    Lipid panel   Other Visit Diagnoses     Colon cancer screening        Relevant Orders    FIT Test      Patient has gained weight and her blood sugar not controlled on glipizide we will expect hemoglobin A1c to have gone up.  Had to take patient off metformin due to worsening kidney disease.  The Januvia was $100 and this was expensive for patient that is why we try the glipizide.  May need to try an alternative and see if anything else is covered at a better rate for patient or go back to try the Januvia.  For now patient will increase her glipizide to 2 a day until we can come up with a plan and the results of the labs are back.  Patient due for urine test.  Also vitamin D follow-up.  Also due for colon cancer screening FIT kit given.  Unable to draw blood work in the office we will send patient to lab.  States she has been seeing eye doctor we will get copy of eye exam.    Patient agrees and verbalizes understanding of medication and treatment plan discussed at today's visit.  Patient was instructed to call or follow-up if symptoms persist or worsen.         Monique Mcdonald,     This note was created with voice recognition software. Inadvertent dictation errors should be disregarded. Please contact my office for clarification.

## 2023-10-20 ENCOUNTER — TELEPHONE (OUTPATIENT)
Dept: FAMILY MEDICINE | Facility: CLINIC | Age: 73
End: 2023-10-20
Payer: MEDICARE

## 2023-10-20 DIAGNOSIS — E11.69 TYPE 2 DIABETES MELLITUS WITH OTHER SPECIFIED COMPLICATION, UNSPECIFIED WHETHER LONG TERM INSULIN USE (CMS/HCC): Primary | ICD-10-CM

## 2023-10-20 NOTE — TELEPHONE ENCOUNTER
Patient went to [facility] today for BW and Urine microalbumin. She was again, not able to give a sample[facility] will [facility] to them, tomorrow. She needs a new order place, though.    Done.

## 2023-10-20 NOTE — LETTER
[facility]  Preventive Exam Fax Back Request   Date:10/20/23     RE:  Vianney Bone (: 1950)     We are reaching out to you because our mutual patient, Vianney Bone (: 1950), reported they had the preventive procedure(s) indicated below performed at your facility:     Diabetic Eye Exam    Please fax the most recent results to our office with this Cover Sheet. Attention: Candi  (If no results are found, please check the appropriate box below)  ? - No results found  ? - Results attached    Please fax to: FAX# [phone]    THANK YOU FOR YOUR PARTNERSHIP IN   PROVIDING EXCELLENT CARE TO OUR PATIENTS!      This request is confidential and intended only for the use of the individual or entity to which it is addressed.  It may contain information that is privileged, confidential, and exempt from disclosure.  if the reader of this message is not the intended recipient, you are hereby notified that any dissemination, distribution, or copying of this communication is strictly prohibited.  If you believe you have received this communication in error, please notify us immediately at [phone].

## 2023-10-20 NOTE — TELEPHONE ENCOUNTER
----- Message from Monique Mcdonald DO sent at 10/19/2023 11:41 AM EDT -----  Regarding: Diabetic eye exam  Please see if we can get diabetic eye exam report from patient's eye doctor.  I added them to care teams.

## 2023-10-21 LAB
25(OH)D3+25(OH)D2 SERPL-MCNC: 20.2 NG/ML (ref 30–100)
ALBUMIN SERPL-MCNC: 4.5 G/DL (ref 3.8–4.8)
ALBUMIN/GLOB SERPL: 1.6 {RATIO} (ref 1.2–2.2)
ALP SERPL-CCNC: 63 IU/L (ref 44–121)
ALT SERPL-CCNC: 14 IU/L (ref 0–32)
AST SERPL-CCNC: 18 IU/L (ref 0–40)
BASOPHILS # BLD AUTO: 0.1 X10E3/UL (ref 0–0.2)
BASOPHILS NFR BLD AUTO: 1 %
BILIRUB SERPL-MCNC: 0.6 MG/DL (ref 0–1.2)
BUN SERPL-MCNC: 23 MG/DL (ref 8–27)
BUN/CREAT SERPL: 12 (ref 12–28)
CALCIUM SERPL-MCNC: 10.1 MG/DL (ref 8.7–10.3)
CHLORIDE SERPL-SCNC: 97 MMOL/L (ref 96–106)
CHOLEST SERPL-MCNC: 192 MG/DL (ref 100–199)
CO2 SERPL-SCNC: 22 MMOL/L (ref 20–29)
CREAT SERPL-MCNC: 1.86 MG/DL (ref 0.57–1)
EGFRCR SERPLBLD CKD-EPI 2021: 29 ML/MIN/1.73
EOSINOPHIL # BLD AUTO: 0.4 X10E3/UL (ref 0–0.4)
EOSINOPHIL NFR BLD AUTO: 5 %
ERYTHROCYTE [DISTWIDTH] IN BLOOD BY AUTOMATED COUNT: 13.5 % (ref 11.7–15.4)
GLOBULIN SER CALC-MCNC: 2.8 G/DL (ref 1.5–4.5)
GLUCOSE SERPL-MCNC: 128 MG/DL (ref 70–99)
HBA1C MFR BLD: 7 % (ref 4.8–5.6)
HCT VFR BLD AUTO: 38 % (ref 34–46.6)
HDLC SERPL-MCNC: 44 MG/DL
HGB BLD-MCNC: 12.7 G/DL (ref 11.1–15.9)
IMM GRANULOCYTES # BLD AUTO: 0.1 X10E3/UL (ref 0–0.1)
IMM GRANULOCYTES NFR BLD AUTO: 1 %
LDLC SERPL CALC-MCNC: 100 MG/DL (ref 0–99)
LYMPHOCYTES # BLD AUTO: 1.6 X10E3/UL (ref 0.7–3.1)
LYMPHOCYTES NFR BLD AUTO: 19 %
MCH RBC QN AUTO: 29.8 PG (ref 26.6–33)
MCHC RBC AUTO-ENTMCNC: 33.4 G/DL (ref 31.5–35.7)
MCV RBC AUTO: 89 FL (ref 79–97)
MONOCYTES # BLD AUTO: 0.6 X10E3/UL (ref 0.1–0.9)
MONOCYTES NFR BLD AUTO: 7 %
NEUTROPHILS # BLD AUTO: 5.7 X10E3/UL (ref 1.4–7)
NEUTROPHILS NFR BLD AUTO: 67 %
PLATELET # BLD AUTO: 319 X10E3/UL (ref 150–450)
POTASSIUM SERPL-SCNC: 4.2 MMOL/L (ref 3.5–5.2)
PROT SERPL-MCNC: 7.3 G/DL (ref 6–8.5)
RBC # BLD AUTO: 4.26 X10E6/UL (ref 3.77–5.28)
SODIUM SERPL-SCNC: 137 MMOL/L (ref 134–144)
TRIGL SERPL-MCNC: 287 MG/DL (ref 0–149)
VLDLC SERPL CALC-MCNC: 48 MG/DL (ref 5–40)
WBC # BLD AUTO: 8.3 X10E3/UL (ref 3.4–10.8)

## 2023-10-22 LAB
ALBUMIN/CREAT UR: 15 MG/G CREAT (ref 0–29)
CREAT UR-MCNC: 64.4 MG/DL
MICROALBUMIN UR-MCNC: 9.7 UG/ML

## 2023-10-24 ENCOUNTER — TELEPHONE (OUTPATIENT)
Dept: FAMILY MEDICINE | Facility: CLINIC | Age: 73
End: 2023-10-24
Payer: MEDICARE

## 2023-10-24 NOTE — RESULT ENCOUNTER NOTE
Tommy Faith, here your lab results.  Hemoglobin A1c has remained at 7 it has not gone any higher.  Unfortunately your kidney function continues to be low.  I do think it is time for you to see a nephrologist to review over your medications and the labs and see if there is anything else we should be doing.  I have also reached out to your cardiologist to see if they have any other suggestions.  The kidney specialist is [facility] and their phone number is [phone].  Monique Mcdonald

## 2023-10-24 NOTE — TELEPHONE ENCOUNTER
Spoke with patient and also reached out to patient's cardiologist.  Patient is about to leave on a trip to [place].  Patient will try and hold her hydrochlorothiazide but take it with her in case she gets any swelling during her trip.  We discussed that we may increase her metoprolol to 50 mg when she returns.  Patient is going to call the kidney specialist and try and get an appointment.  We will have her follow-up with either myself or the cardiologist when she changes her medicine.

## 2023-11-06 DIAGNOSIS — I10 BENIGN ESSENTIAL HYPERTENSION: Primary | ICD-10-CM

## 2023-11-06 RX ORDER — AMLODIPINE BESYLATE 5 MG/1
5 TABLET ORAL DAILY
Qty: 90 TABLET | Refills: 3 | Status: SHIPPED | OUTPATIENT
Start: 2023-11-06 | End: 2024-10-31

## 2023-11-06 NOTE — TELEPHONE ENCOUNTER
Medicine Refill Request    Last Office Visit: 10/16/2023   Last Consult Visit: Visit date not found  Last Telemedicine Visit: Visit date not found    Next Appointment: 4/15/2024      Current Outpatient Medications:     ALPRAZolam (XANAX) 0.25 mg tablet, TAKE 1 TABLET BY MOUTH TWICE A DAY AS NEEDED FOR ANXIETY OR SLEEP, Disp: 60 tablet, Rfl: 1    amLODIPine (NORVASC) 5 mg tablet, Take 1 tablet (5 mg total) by mouth daily., Disp: 90 tablet, Rfl: 1    atorvastatin (LIPITOR) 40 mg tablet, Take 1 tablet (40 mg total) by mouth daily., Disp: 90 tablet, Rfl: 3    cholecalciferol, vitamin D3, 1,000 unit (25 mcg) tablet, take 2 tablets daily, Disp: , Rfl:     fosinopriL (MONOPRIL) 40 mg tablet, TAKE 1 TABLET BY MOUTH EVERY DAY, Disp: 90 tablet, Rfl: 1    glipiZIDE (GLUCOTROL XL) 2.5 mg 24 hr tablet, Take 1 tablet (2.5 mg total) by mouth daily with breakfast., Disp: 90 tablet, Rfl: 1    hydrochlorothiazide (HYDRODIURIL) 25 mg tablet, Take 1 tablet (25 mg total) by mouth daily., Disp: 90 tablet, Rfl: 0    metoprolol succinate XL (TOPROL-XL) 25 mg 24 hr tablet, Take 1 tablet (25 mg total) by mouth daily., Disp: 90 tablet, Rfl: 3      BP Readings from Last 3 Encounters:   10/19/23 120/60   10/16/23 110/72   05/25/23 120/80       Recent Lab results:  Lab Results   Component Value Date    CHOL 192 10/20/2023   ,   Lab Results   Component Value Date    HDL 44 10/20/2023   ,   Lab Results   Component Value Date    LDLCALC 100 (H) 10/20/2023   ,   Lab Results   Component Value Date    TRIG 287 (H) 10/20/2023        Lab Results   Component Value Date    GLUCOSE 128 (H) 10/20/2023   ,   Lab Results   Component Value Date    HGBA1C 7.0 (H) 10/20/2023         Lab Results   Component Value Date    CREATININE 1.86 (H) 10/20/2023       Lab Results   Component Value Date    TSH 3.360 07/16/2016           Lab Results   Component Value Date    HGBA1C 7.0 (H) 10/20/2023     e

## 2023-11-06 NOTE — TELEPHONE ENCOUNTER
Received refill request    [facility] Yates/Kike Reynolds    AMLODIPINE BESYLATE 5 MG TAB    (Last written 1/2023; Last filled 6/2023)    Last OV 10/16/2023 (continue amlodipine 5mg)    Next OV 4/15/2024

## 2023-11-13 LAB
ATRIAL RATE: 63
P AXIS: 54
PR INTERVAL: 168
QRS DURATION: 94
QT INTERVAL: 368
QTC CALCULATION(BAZETT): 376
R AXIS: 37
T WAVE AXIS: 253
VENTRICULAR RATE: 63

## 2023-11-17 DIAGNOSIS — E11.69 TYPE 2 DIABETES MELLITUS WITH OTHER SPECIFIED COMPLICATION, WITHOUT LONG-TERM CURRENT USE OF INSULIN: ICD-10-CM

## 2023-11-17 RX ORDER — GLIPIZIDE 2.5 MG/1
2.5 TABLET, EXTENDED RELEASE ORAL
Qty: 90 TABLET | Refills: 1 | Status: SHIPPED | OUTPATIENT
Start: 2023-11-17 | End: 2023-11-22 | Stop reason: SINTOL

## 2023-11-17 NOTE — TELEPHONE ENCOUNTER
Medicine Refill Request    Last Office Visit: 10/19/2023   Last Consult Visit: Visit date not found  Last Telemedicine Visit: Visit date not found    Next Appointment: Visit date not found      Current Outpatient Medications:     ALPRAZolam (XANAX) 0.25 mg tablet, TAKE 1 TABLET BY MOUTH TWICE A DAY AS NEEDED FOR ANXIETY OR SLEEP, Disp: 60 tablet, Rfl: 1    amLODIPine (NORVASC) 5 mg tablet, Take 1 tablet (5 mg total) by mouth daily., Disp: 90 tablet, Rfl: 3    atorvastatin (LIPITOR) 40 mg tablet, Take 1 tablet (40 mg total) by mouth daily., Disp: 90 tablet, Rfl: 3    cholecalciferol, vitamin D3, 1,000 unit (25 mcg) tablet, take 2 tablets daily, Disp: , Rfl:     fosinopriL (MONOPRIL) 40 mg tablet, TAKE 1 TABLET BY MOUTH EVERY DAY, Disp: 90 tablet, Rfl: 1    glipiZIDE (GLUCOTROL XL) 2.5 mg 24 hr tablet, Take 1 tablet (2.5 mg total) by mouth daily with breakfast., Disp: 90 tablet, Rfl: 1    hydrochlorothiazide (HYDRODIURIL) 25 mg tablet, Take 1 tablet (25 mg total) by mouth daily., Disp: 90 tablet, Rfl: 0    metoprolol succinate XL (TOPROL-XL) 25 mg 24 hr tablet, Take 1 tablet (25 mg total) by mouth daily., Disp: 90 tablet, Rfl: 3      BP Readings from Last 3 Encounters:   10/19/23 120/60   10/16/23 110/72   05/25/23 120/80       Recent Lab results:  Lab Results   Component Value Date    CHOL 192 10/20/2023   ,   Lab Results   Component Value Date    HDL 44 10/20/2023   ,   Lab Results   Component Value Date    LDLCALC 100 (H) 10/20/2023   ,   Lab Results   Component Value Date    TRIG 287 (H) 10/20/2023        Lab Results   Component Value Date    GLUCOSE 128 (H) 10/20/2023   ,   Lab Results   Component Value Date    HGBA1C 7.0 (H) 10/20/2023         Lab Results   Component Value Date    CREATININE 1.86 (H) 10/20/2023       Lab Results   Component Value Date    TSH 3.360 07/16/2016           Lab Results   Component Value Date    HGBA1C 7.0 (H) 10/20/2023

## 2023-12-12 ENCOUNTER — TRANSCRIBE ORDERS (OUTPATIENT)
Dept: SCHEDULING | Age: 73
End: 2023-12-12

## 2023-12-12 DIAGNOSIS — N17.9 ACUTE KIDNEY FAILURE, UNSPECIFIED (CMS/HCC): Primary | ICD-10-CM

## 2024-01-04 ENCOUNTER — HOSPITAL ENCOUNTER (OUTPATIENT)
Dept: RADIOLOGY | Facility: CLINIC | Age: 74
Discharge: HOME | End: 2024-01-04
Attending: INTERNAL MEDICINE
Payer: MEDICARE

## 2024-01-04 DIAGNOSIS — N17.9 ACUTE KIDNEY FAILURE, UNSPECIFIED (CMS/HCC): ICD-10-CM

## 2024-01-04 PROCEDURE — 76775 US EXAM ABDO BACK WALL LIM: CPT

## 2024-01-10 NOTE — TELEPHONE ENCOUNTER
Pt called , she needs a refill of fosinopril .   She 2 tablets let a ne needs a refill to her [facility] on [address].     Patient would like a call back when sent

## 2024-01-11 RX ORDER — FOSINOPRIL SODIUM 40 MG/1
40 TABLET ORAL DAILY
Qty: 90 TABLET | Refills: 1 | Status: SHIPPED | OUTPATIENT
Start: 2024-01-11 | End: 2024-07-01

## 2024-02-02 DIAGNOSIS — I10 BENIGN ESSENTIAL HYPERTENSION: Primary | ICD-10-CM

## 2024-02-02 RX ORDER — METOPROLOL SUCCINATE 25 MG/1
25 TABLET, EXTENDED RELEASE ORAL DAILY
Qty: 90 TABLET | Refills: 3 | Status: SHIPPED | OUTPATIENT
Start: 2024-02-02 | End: 2024-11-19 | Stop reason: SDUPTHER

## 2024-02-02 NOTE — TELEPHONE ENCOUNTER
[facility] requesting [facility] on [facility]rolol succ 25mg    Medicine Refill Request    Last Office Visit: 10/16/23  Last Consult Visit: Visit date not found  Last Telemedicine Visit: Visit date not found    Next Appointment: 4/15/24      Current Outpatient Medications:   •  ALPRAZolam (XANAX) 0.25 mg tablet, TAKE 1 TABLET BY MOUTH TWICE A DAY AS NEEDED FOR ANXIETY OR SLEEP, Disp: 60 tablet, Rfl: 2  •  amLODIPine (NORVASC) 5 mg tablet, Take 1 tablet (5 mg total) by mouth daily., Disp: 90 tablet, Rfl: 3  •  atorvastatin (LIPITOR) 40 mg tablet, Take 1 tablet (40 mg total) by mouth daily., Disp: 90 tablet, Rfl: 3  •  cholecalciferol, vitamin D3, 1,000 unit (25 mcg) tablet, take 2 tablets daily, Disp: , Rfl:   •  fosinopriL (MONOPRIL) 40 mg tablet, Take 1 tablet (40 mg total) by mouth daily., Disp: 90 tablet, Rfl: 1  •  hydrochlorothiazide (HYDRODIURIL) 25 mg tablet, TAKE 1 TABLET (25 MG TOTAL) BY MOUTH DAILY., Disp: 90 tablet, Rfl: 2  •  metoprolol succinate XL (TOPROL-XL) 25 mg 24 hr tablet, Take 1 tablet (25 mg total) by mouth daily., Disp: 90 tablet, Rfl: 3  •  nateglinide (STARLIX) 60 mg tablet, Take 1 tablet (60 mg total) by mouth 3 (three) times a day before meals., Disp: 270 tablet, Rfl: 0      BP Readings from Last 3 Encounters:   10/19/23 120/60   10/16/23 110/72   05/25/23 120/80       Recent Lab results:  Lab Results   Component Value Date    CHOL 192 10/20/2023   ,   Lab Results   Component Value Date    HDL 44 10/20/2023   ,   Lab Results   Component Value Date    LDLCALC 100 (H) 10/20/2023   ,   Lab Results   Component Value Date    TRIG 287 (H) 10/20/2023        Lab Results   Component Value Date    GLUCOSE 128 (H) 10/20/2023   ,   Lab Results   Component Value Date    HGBA1C 7.0 (H) 10/20/2023         Lab Results   Component Value Date    CREATININE 1.86 (H) 10/20/2023       Lab Results   Component Value Date    TSH 3.360 07/16/2016           Lab Results   Component Value Date    HGBA1C 7.0 (H) 10/20/2023

## 2024-02-20 ENCOUNTER — TRANSCRIBE ORDERS (OUTPATIENT)
Dept: REGISTRATION | Facility: HOSPITAL | Age: 74
End: 2024-02-20

## 2024-02-20 ENCOUNTER — HOSPITAL ENCOUNTER (OUTPATIENT)
Dept: RADIOLOGY | Facility: HOSPITAL | Age: 74
Discharge: HOME | End: 2024-02-20
Attending: OBSTETRICS & GYNECOLOGY
Payer: MEDICARE

## 2024-02-20 DIAGNOSIS — Z12.31 ENCOUNTER FOR SCREENING MAMMOGRAM FOR MALIGNANT NEOPLASM OF BREAST: ICD-10-CM

## 2024-02-20 DIAGNOSIS — Z12.31 ENCOUNTER FOR SCREENING MAMMOGRAM FOR MALIGNANT NEOPLASM OF BREAST: Primary | ICD-10-CM

## 2024-02-20 PROCEDURE — 77067 SCR MAMMO BI INCL CAD: CPT

## 2024-02-26 ENCOUNTER — HOSPITAL ENCOUNTER (OUTPATIENT)
Dept: RADIOLOGY | Facility: HOSPITAL | Age: 74
Discharge: HOME | End: 2024-02-26
Attending: STUDENT IN AN ORGANIZED HEALTH CARE EDUCATION/TRAINING PROGRAM
Payer: MEDICARE

## 2024-02-26 DIAGNOSIS — R92.8 ABNORMAL MAMMOGRAM: ICD-10-CM

## 2024-02-26 PROCEDURE — G0279 TOMOSYNTHESIS, MAMMO: HCPCS | Mod: RT

## 2024-02-26 PROCEDURE — 76642 ULTRASOUND BREAST LIMITED: CPT | Mod: RT

## 2024-02-26 PROCEDURE — 77065 DX MAMMO INCL CAD UNI: CPT | Mod: RT

## 2024-03-24 RX ORDER — GLIPIZIDE 2.5 MG/1
1 TABLET, EXTENDED RELEASE ORAL
COMMUNITY
End: 2024-04-15

## 2024-03-24 NOTE — PROGRESS NOTES
Subjective      Patient ID: Vianney Bone is a 73 y.o. female.    Patient here for chronic conditions. She is followed for DM and hypertension. She has been doing well.          Tobacco  Meds  Problems  Med Hx  Surg Hx  Fam Hx       Review of Systems   Constitutional:  Negative for activity change and fatigue.   Respiratory:  Negative for cough and shortness of breath.    Cardiovascular:  Negative for chest pain, palpitations and leg swelling.   Gastrointestinal:  Negative for abdominal pain.   Endocrine: Negative for polydipsia, polyphagia and polyuria.     No Known Allergies  Current Outpatient Medications   Medication Sig Dispense Refill    ALPRAZolam (XANAX) 0.25 mg tablet TAKE 1 TABLET BY MOUTH TWICE A DAY AS NEEDED FOR ANXIETY OR SLEEP 60 tablet 2    amLODIPine (NORVASC) 5 mg tablet Take 1 tablet (5 mg total) by mouth daily. 90 tablet 3    atorvastatin (LIPITOR) 40 mg tablet Take 1 tablet (40 mg total) by mouth daily. 90 tablet 3    cholecalciferol, vitamin D3, 1,000 unit (25 mcg) tablet take 2 tablets daily      fosinopriL (MONOPRIL) 40 mg tablet Take 1 tablet (40 mg total) by mouth daily. 90 tablet 1    glipiZIDE (GLUCOTROL XL) 2.5 mg 24 hr tablet Take 1 tablet by mouth daily with breakfast.      hydrochlorothiazide (HYDRODIURIL) 25 mg tablet TAKE 1 TABLET (25 MG TOTAL) BY MOUTH DAILY. (Patient taking differently: Take 25 mg by mouth 3 (three) times a week (Mon, Wed, Fri).) 90 tablet 2    metoprolol succinate XL (TOPROL-XL) 25 mg 24 hr tablet Take 1 tablet (25 mg total) by mouth daily. 90 tablet 3    nateglinide (STARLIX) 60 mg tablet TAKE 1 TABLET (60 MG TOTAL) BY MOUTH 3 (THREE) TIMES A DAY BEFORE MEALS. 270 tablet 1     No current facility-administered medications for this visit.     Past Medical History:   Diagnosis Date    Diabetes mellitus (CMS/HCC) 1/3/2011    Overview:     Hypertension     Mixed hyperlipidemia 1/3/2011    Overview:     Squamous cell cancer of skin of nose 6/27/2018     Past  "Surgical History:   Procedure Laterality Date    SQUAMOUS CELL CARCINOMA EXCISION       Social History     Tobacco Use    Smoking status: Never    Smokeless tobacco: Never   Vaping Use    Vaping Use: Never used   Substance Use Topics    Alcohol use: No     Family History   Problem Relation Age of Onset    Hypertension Biological Mother     Heart disease Biological Father     Stroke Biological Father     Heart disease Biological Brother     Ovarian cancer Biological Sister     Bipolar disorder Biological Sister     Rheum arthritis Biological Sister     Aneurysm Biological Son        Objective   Vitals:    03/25/24 0942   BP: 120/78   Pulse: 88   Resp: 16   Weight: 72.1 kg (159 lb)   Height: 1.638 m (5' 4.5\")    Body mass index is 26.87 kg/m².  Physical Exam  Constitutional:       General: She is not in acute distress.     Appearance: Normal appearance. She is well-developed and normal weight.   Cardiovascular:      Rate and Rhythm: Normal rate and regular rhythm.      Heart sounds: Normal heart sounds, S1 normal and S2 normal. No murmur heard.  Pulmonary:      Effort: Pulmonary effort is normal. No respiratory distress.      Breath sounds: Normal breath sounds. No decreased breath sounds, wheezing, rhonchi or rales.   Abdominal:      General: Bowel sounds are normal. There is no distension.      Palpations: Abdomen is soft. There is no mass.      Tenderness: There is no abdominal tenderness.   Musculoskeletal:      Right lower leg: No edema.      Left lower leg: No edema.   Neurological:      Mental Status: She is alert.         Assessment/Plan   Problem List Items Addressed This Visit          Circulatory    Benign essential hypertension       Genitourinary    Stage 3 chronic kidney disease (CMS/HCC)       Endocrine/Metabolic    Type 2 diabetes mellitus with other specified complication (CMS/HCC) - Primary    Relevant Medications    glipiZIDE (GLUCOTROL XL) 2.5 mg 24 hr tablet    Other Relevant Orders    Hemoglobin " A1c   Patient taking medication and watching diet. Due for hga1c. Discussed different medication options/cost. F/u in 6 months. Discussed diabetic eye exam.    Patient agrees and verbalizes understanding of medication and treatment plan discussed at today's visit.  Patient was instructed to call or follow-up if symptoms persist or worsen.         Monique Mcdonald, DO    This note was created with voice recognition software. Inadvertent dictation errors should be disregarded. Please contact my office for clarification.

## 2024-03-25 ENCOUNTER — OFFICE VISIT (OUTPATIENT)
Dept: FAMILY MEDICINE | Facility: CLINIC | Age: 74
End: 2024-03-25
Payer: MEDICARE

## 2024-03-25 VITALS
WEIGHT: 159 LBS | RESPIRATION RATE: 16 BRPM | SYSTOLIC BLOOD PRESSURE: 120 MMHG | HEIGHT: 65 IN | DIASTOLIC BLOOD PRESSURE: 78 MMHG | HEART RATE: 88 BPM | BODY MASS INDEX: 26.49 KG/M2

## 2024-03-25 DIAGNOSIS — N18.31 STAGE 3A CHRONIC KIDNEY DISEASE (CMS/HCC): ICD-10-CM

## 2024-03-25 DIAGNOSIS — I10 BENIGN ESSENTIAL HYPERTENSION: ICD-10-CM

## 2024-03-25 DIAGNOSIS — E11.69 TYPE 2 DIABETES MELLITUS WITH OTHER SPECIFIED COMPLICATION, WITHOUT LONG-TERM CURRENT USE OF INSULIN: Primary | ICD-10-CM

## 2024-03-25 PROCEDURE — 99214 OFFICE O/P EST MOD 30 MIN: CPT | Performed by: FAMILY MEDICINE

## 2024-03-25 PROCEDURE — G8754 DIAS BP LESS 90: HCPCS | Performed by: FAMILY MEDICINE

## 2024-03-25 PROCEDURE — G8752 SYS BP LESS 140: HCPCS | Performed by: FAMILY MEDICINE

## 2024-03-27 ASSESSMENT — ENCOUNTER SYMPTOMS
COUGH: 0
ACTIVITY CHANGE: 0
SHORTNESS OF BREATH: 0
POLYPHAGIA: 0
POLYDIPSIA: 0
PALPITATIONS: 0
FATIGUE: 0
ABDOMINAL PAIN: 0

## 2024-04-04 LAB — HBA1C MFR BLD: 8.5 % (ref 4.8–5.6)

## 2024-04-05 ENCOUNTER — TELEPHONE (OUTPATIENT)
Dept: FAMILY MEDICINE | Facility: CLINIC | Age: 74
End: 2024-04-05
Payer: MEDICARE

## 2024-04-05 DIAGNOSIS — E11.69 TYPE 2 DIABETES MELLITUS WITH OTHER SPECIFIED COMPLICATION, WITHOUT LONG-TERM CURRENT USE OF INSULIN: ICD-10-CM

## 2024-04-05 DIAGNOSIS — E11.69 TYPE 2 DIABETES MELLITUS WITH OTHER SPECIFIED COMPLICATION, WITHOUT LONG-TERM CURRENT USE OF INSULIN: Primary | ICD-10-CM

## 2024-04-05 RX ORDER — NATEGLINIDE 60 MG/1
60 TABLET ORAL
Qty: 270 TABLET | Refills: 1 | OUTPATIENT
Start: 2024-04-05 | End: 2025-04-05

## 2024-04-05 NOTE — TELEPHONE ENCOUNTER
Discussed with patient  hga1c increased from 7.0 to 8.5. Despite medication and good diet. Cannot use metformin due to kidney function.  Will try mounjaro if covered. Await to see if covered and cost

## 2024-04-05 NOTE — TELEPHONE ENCOUNTER
Medicine Refill Request    Last Office Visit: 3/25/2024   Last Consult Visit: Visit date not found  Last Telemedicine Visit: Visit date not found    Next Appointment: Visit date not found      Current Outpatient Medications:     ALPRAZolam (XANAX) 0.25 mg tablet, TAKE 1 TABLET BY MOUTH TWICE A DAY AS NEEDED FOR ANXIETY OR SLEEP, Disp: 60 tablet, Rfl: 2    amLODIPine (NORVASC) 5 mg tablet, Take 1 tablet (5 mg total) by mouth daily., Disp: 90 tablet, Rfl: 3    atorvastatin (LIPITOR) 40 mg tablet, Take 1 tablet (40 mg total) by mouth daily., Disp: 90 tablet, Rfl: 3    cholecalciferol, vitamin D3, 1,000 unit (25 mcg) tablet, take 2 tablets daily, Disp: , Rfl:     fosinopriL (MONOPRIL) 40 mg tablet, Take 1 tablet (40 mg total) by mouth daily., Disp: 90 tablet, Rfl: 1    glipiZIDE (GLUCOTROL XL) 2.5 mg 24 hr tablet, Take 1 tablet by mouth daily with breakfast., Disp: , Rfl:     hydrochlorothiazide (HYDRODIURIL) 25 mg tablet, TAKE 1 TABLET (25 MG TOTAL) BY MOUTH DAILY. (Patient taking differently: Take 25 mg by mouth 3 (three) times a week (Mon, Wed, Fri).), Disp: 90 tablet, Rfl: 2    metoprolol succinate XL (TOPROL-XL) 25 mg 24 hr tablet, Take 1 tablet (25 mg total) by mouth daily., Disp: 90 tablet, Rfl: 3    nateglinide (STARLIX) 60 mg tablet, TAKE 1 TABLET (60 MG TOTAL) BY MOUTH 3 (THREE) TIMES A DAY BEFORE MEALS., Disp: 270 tablet, Rfl: 1      BP Readings from Last 3 Encounters:   03/25/24 120/78   10/19/23 120/60   10/16/23 110/72       Recent Lab results:  Lab Results   Component Value Date    CHOL 192 10/20/2023   ,   Lab Results   Component Value Date    HDL 44 10/20/2023   ,   Lab Results   Component Value Date    LDLCALC 100 (H) 10/20/2023   ,   Lab Results   Component Value Date    TRIG 287 (H) 10/20/2023        Lab Results   Component Value Date    GLUCOSE 128 (H) 10/20/2023   ,   Lab Results   Component Value Date    HGBA1C 8.5 (H) 04/04/2024         Lab Results   Component Value Date    CREATININE 1.86 (H)  10/20/2023       Lab Results   Component Value Date    TSH 3.360 07/16/2016           Lab Results   Component Value Date    HGBA1C 8.5 (H) 04/04/2024

## 2024-04-15 ENCOUNTER — OFFICE VISIT (OUTPATIENT)
Dept: CARDIOLOGY | Facility: CLINIC | Age: 74
End: 2024-04-15
Payer: MEDICARE

## 2024-04-15 VITALS
HEIGHT: 65 IN | OXYGEN SATURATION: 95 % | SYSTOLIC BLOOD PRESSURE: 122 MMHG | HEART RATE: 64 BPM | BODY MASS INDEX: 26.52 KG/M2 | WEIGHT: 159.2 LBS | DIASTOLIC BLOOD PRESSURE: 84 MMHG | RESPIRATION RATE: 16 BRPM

## 2024-04-15 DIAGNOSIS — E78.2 MIXED HYPERLIPIDEMIA: ICD-10-CM

## 2024-04-15 DIAGNOSIS — I10 BENIGN ESSENTIAL HYPERTENSION: Primary | ICD-10-CM

## 2024-04-15 DIAGNOSIS — N18.31 STAGE 3A CHRONIC KIDNEY DISEASE (CMS/HCC): ICD-10-CM

## 2024-04-15 DIAGNOSIS — Z87.59 HISTORY OF PRE-ECLAMPSIA: ICD-10-CM

## 2024-04-15 DIAGNOSIS — E11.69 TYPE 2 DIABETES MELLITUS WITH OTHER SPECIFIED COMPLICATION, WITHOUT LONG-TERM CURRENT USE OF INSULIN: ICD-10-CM

## 2024-04-15 LAB
ATRIAL RATE: 64
P AXIS: 65
PR INTERVAL: 174
QRS DURATION: 88
QT INTERVAL: 416
QTC CALCULATION(BAZETT): 429
R AXIS: 40
T WAVE AXIS: -3
VENTRICULAR RATE: 64

## 2024-04-15 PROCEDURE — G8754 DIAS BP LESS 90: HCPCS | Performed by: STUDENT IN AN ORGANIZED HEALTH CARE EDUCATION/TRAINING PROGRAM

## 2024-04-15 PROCEDURE — G8752 SYS BP LESS 140: HCPCS | Performed by: STUDENT IN AN ORGANIZED HEALTH CARE EDUCATION/TRAINING PROGRAM

## 2024-04-15 PROCEDURE — 99214 OFFICE O/P EST MOD 30 MIN: CPT | Performed by: STUDENT IN AN ORGANIZED HEALTH CARE EDUCATION/TRAINING PROGRAM

## 2024-04-15 PROCEDURE — 93000 ELECTROCARDIOGRAM COMPLETE: CPT | Performed by: STUDENT IN AN ORGANIZED HEALTH CARE EDUCATION/TRAINING PROGRAM

## 2024-04-15 RX ORDER — HYDROCHLOROTHIAZIDE 25 MG/1
25 TABLET ORAL 3 TIMES WEEKLY
COMMUNITY
Start: 2024-04-15 | End: 2024-09-26

## 2024-04-15 NOTE — ASSESSMENT & PLAN NOTE
5/26/2023 hemoglobin A1c 7.0%  --Glucose is managed by her primary care physician  -- Continue rosuvastatin 40 mg daily

## 2024-04-15 NOTE — ASSESSMENT & PLAN NOTE
5/26/2023 lipid panel: Total cholesterol 163 triglycerides 179 HDL 46 LDL 86  10/5/2021 lipid panel: Total cholesterol 131 triglycerides 251 HDL 41 LDL 51  --Continue atorvastatin

## 2024-04-15 NOTE — ASSESSMENT & PLAN NOTE
Blood pressure in the office today acceptable measuring 122/84 mmHg  --Continue metoprolol succinate 25 mg daily, amlodipine 5 mg, hydrochlorothiazide 25 mg three times weekly, and fosinopril 40 mg daily

## 2024-04-15 NOTE — PROGRESS NOTES
Minnie Renee,   Cardiology    [facility] [address] [facility] [id number]    [phone]    [facility]     4/15/2024     Reason for visit: Cardiovascular follow-up    Vianney Bone is a 73 y.o. female who presents for cardiovascular follow-up. Vianney has a history of hypertension, hyperlipidemia, and diabetes mellitus type 2. I last saw her in the office in 2023 at which time she was struggling with uncontrolled blood sugars at home with recent weight gain.  I recommended continuation of her cardiac medications and follow-up with her primary care doctor regarding her diabetes.    She denies cardiac symptoms including chest discomfort, shortness of breath, palpitations, lower extremity edema, presyncope and syncope. She is still struggling to get on a good medication regimen for her diabetes due to medication cost and availability.    Past Medical History:  1. Hypertension  2. Preeclampsia, term  3. Diabetes mellitus, type II  4. Hyperlipidemia  5. CKD  6. Insomnia   7. Squamous cell carcinoma  8. Obstetrics history     Past Surgical History:  1. Mohs surgery    Medications:   Current Outpatient Medications:     ALPRAZolam (XANAX) 0.25 mg tablet, TAKE 1 TABLET BY MOUTH TWICE A DAY AS NEEDED FOR ANXIETY OR SLEEP, Disp: 60 tablet, Rfl: 2    amLODIPine (NORVASC) 5 mg tablet, Take 1 tablet (5 mg total) by mouth daily., Disp: 90 tablet, Rfl: 3    atorvastatin (LIPITOR) 40 mg tablet, Take 1 tablet (40 mg total) by mouth daily., Disp: 90 tablet, Rfl: 3    cholecalciferol, vitamin D3, 1,000 unit (25 mcg) tablet, take 2 tablets daily, Disp: , Rfl:     dulaglutide 0.75 mg/0.5 mL pen injector, Inject 0.5 mL (0.75 mg total) under the skin once a week. Do not prime., Disp: 2 mL, Rfl: 1    fosinopriL (MONOPRIL) 40 mg tablet, Take 1 tablet (40 mg total) by mouth daily., Disp: 90 tablet, Rfl: 1    hydrochlorothiazide (HYDRODIURIL) 25 mg tablet, Take 1 tablet (25  mg total) by mouth 3 (three) times a week (Mon, Wed, Fri)., Disp: , Rfl:     metoprolol succinate XL (TOPROL-XL) 25 mg 24 hr tablet, Take 1 tablet (25 mg total) by mouth daily., Disp: 90 tablet, Rfl: 3    nateglinide (STARLIX) 60 mg tablet, TAKE 1 TABLET (60 MG TOTAL) BY MOUTH 3 (THREE) TIMES A DAY BEFORE MEALS., Disp: 270 tablet, Rfl: 1      Allergies: Patient has no known allergies.    Social History: Retired. Worked at [employer] for 28 years.  .  Never smoker.  Denies alcohol use.  Denies illicit drug use.    Family History: Reviewed and significant for father with coronary artery disease with bypass surgery and multiple strokes.  Brother with coronary artery disease and bypass surgery at the age of 65.  Almost all of her 8 siblings of hypertension.  Son with suspected brain aneurysm.    Exam:  Objective   Vitals:    04/15/24 1015   BP: 122/84   Pulse: 64   Resp: 16   SpO2: 95%       Body mass index is 26.9 kg/m².  Physical Exam  Constitutional:       Appearance: Normal appearance.   HENT:      Head: Normocephalic and atraumatic.   Eyes:      General: No scleral icterus.  Neck:      Vascular: No JVD.   Cardiovascular:      Rate and Rhythm: Normal rate and regular rhythm.      Heart sounds: No murmur heard.  Pulmonary:      Effort: Pulmonary effort is normal. No respiratory distress.      Breath sounds: Normal breath sounds. No wheezing, rhonchi or rales.   Abdominal:      General: There is no distension.      Palpations: Abdomen is soft.   Musculoskeletal:      Right lower leg: No edema.      Left lower leg: No edema.   Skin:     General: Skin is warm and dry.   Neurological:      Mental Status: She is alert and oriented to person, place, and time.   Psychiatric:         Mood and Affect: Mood normal.         Behavior: Behavior normal.         Labs:  Lab Results   Component Value Date    WBC 8.3 10/20/2023    HGB 12.7 10/20/2023     10/20/2023    CHOL 192 10/20/2023    TRIG 287 (H) 10/20/2023    HDL 44  10/20/2023    ALT 14 10/20/2023    AST 18 10/20/2023     10/20/2023    K 4.2 10/20/2023    CREATININE 1.86 (H) 10/20/2023    TSH 3.360 07/16/2016    HGBA1C 8.5 (H) 04/04/2024    MICROALBUR 9.7 10/21/2023   10/5/2021 lipid panel: Total cholesterol 131 triglycerides 251 HDL 41 LDL 51  5/26/2023 lipid panel: Total cholesterol 163 triglycerides 179 HDL 46 LDL 86  Personally reviewed, my interpretation: Hypertriglyceridemia, CKD    Cardiovascular Studies:   1. Echocardiogram 11/9/2021 (personally reviewed): Technically difficult study. Preserved LV systolic function. Mild concentric left ventricular hypertrophy. Estimated EF 65%. Wall motion appears grossly normal. Indeterminate diastolic function. Normal-sized RV. Normal RV systolic function. Normal-sized LA. Normal-sized RA. Tricuspid aortic valve. Sclerotic aortic valve leaflets. No aortic valve regurgitation. Thickened mitral valve leaflets. Mild mitral annular calcification. No appreciable mitral valve regurgitation. Trace tricuspid valve regurgitation. Estimated RVSP = 27 mmHg. Grossly normal pulmonic valve structure. Trace pulmonic valve regurgitation. Normal-sized IVC. IVC collapses >50% during inspiration. No significant pericardial effusion.  2. Stress echo 4/19/2012: Patient exercised for 7 minutes and 33 seconds.  No chest pain.  Stress EKG showed exaggeration of baseline ST depressions and new T wave inversions.  Stress echo showed no ischemia.  Resting echo showed normal left ventricular size and wall thickness.  LVEF was 65%.  Aortic valve trileaflet.  Trace mitral regurgitation and tricuspid regurgitation.  PA pressure 60 mmHg plus right atrial pressure.    ECG performed today and discussed with patient showing sinus rhythm with ST wave abnormalities unchanged from prior    Assessment/Plan   Problem List Items Addressed This Visit          Circulatory    Benign essential hypertension - Primary     Blood pressure in the office today acceptable  measuring 122/84 mmHg  --Continue metoprolol succinate 25 mg daily, amlodipine 5 mg, hydrochlorothiazide 25 mg three times weekly, and fosinopril 40 mg daily         Relevant Medications    hydrochlorothiazide (HYDRODIURIL) 25 mg tablet    Other Relevant Orders    [facility] MUSE ECG 12 lead (clinic performed) (Completed)       Genitourinary    Stage 3 chronic kidney disease (CMS/[facility])     2023 creatinine 1.6         Relevant Medications    hydrochlorothiazide (HYDRODIURIL) 25 mg tablet       Endocrine/Metabolic    Type 2 diabetes mellitus with other specified complication (CMS/[facility])     2023 hemoglobin A1c 7.0%  --Glucose is managed by her primary care physician  -- Continue rosuvastatin 40 mg daily            Other    Mixed hyperlipidemia     2023 lipid panel: Total cholesterol 163 triglycerides 179 HDL 46 LDL 86  10/5/2021 lipid panel: Total cholesterol 131 triglycerides 251 HDL 41 LDL 51  --Continue atorvastatin         Relevant Orders    Hepatic function panel    Lipid panel    History of pre-eclampsia     Preeclampsia is a sex-specific cardiovascular risk factor and is associated with premature cardiovascular disease (CVD) including stroke, myocardial infarction, and heart failure with preserved ejection fraction.  --Reviewed that women with a history of adverse pregnancy outcome (APO), including hypertensive disorders of pregnancy (preeclampsia and gestational hypertension), gestational diabetes,  birth, and small for gestational age infant are at increased risk of future cardiovascular events and risk factor development.   --Plan for aggressive lifelong risk factor optimization: Lifestyle modification discussed, including the importance of heart healthy diet and regular aerobic exercise. Ensure adequate blood pressure control with goal less than 130/80 mmHg. Plan for annual fasting glucose and lipid monitoring with target LDL less than 100.                Minnie Renee, DO    This letter was  generated using speech recognition software. Please excuse any typographical errors.    Return in about 6 months (around 10/15/2024).

## 2024-05-13 DIAGNOSIS — E11.69 TYPE 2 DIABETES MELLITUS WITH OTHER SPECIFIED COMPLICATION, WITHOUT LONG-TERM CURRENT USE OF INSULIN: ICD-10-CM

## 2024-05-13 RX ORDER — NATEGLINIDE 60 MG/1
60 TABLET ORAL
Qty: 270 TABLET | Refills: 3 | Status: SHIPPED | OUTPATIENT
Start: 2024-05-13 | End: 2025-05-13

## 2024-05-13 NOTE — TELEPHONE ENCOUNTER
Medicine Refill Request    Last Office Visit: 3/25/2024   Last Consult Visit: Visit date not found  Last Telemedicine Visit: Visit date not found    Next Appointment: Visit date not found      Current Outpatient Medications:     ALPRAZolam (XANAX) 0.25 mg tablet, TAKE 1 TABLET BY MOUTH TWICE A DAY AS NEEDED FOR ANXIETY OR SLEEP, Disp: 60 tablet, Rfl: 2    amLODIPine (NORVASC) 5 mg tablet, Take 1 tablet (5 mg total) by mouth daily., Disp: 90 tablet, Rfl: 3    atorvastatin (LIPITOR) 40 mg tablet, Take 1 tablet (40 mg total) by mouth daily., Disp: 90 tablet, Rfl: 3    cholecalciferol, vitamin D3, 1,000 unit (25 mcg) tablet, take 2 tablets daily, Disp: , Rfl:     dulaglutide 0.75 mg/0.5 mL pen injector, Inject 0.5 mL (0.75 mg total) under the skin once a week. Do not prime., Disp: 2 mL, Rfl: 1    fosinopriL (MONOPRIL) 40 mg tablet, Take 1 tablet (40 mg total) by mouth daily., Disp: 90 tablet, Rfl: 1    hydrochlorothiazide (HYDRODIURIL) 25 mg tablet, Take 1 tablet (25 mg total) by mouth 3 (three) times a week (Mon, Wed, Fri)., Disp: , Rfl:     metoprolol succinate XL (TOPROL-XL) 25 mg 24 hr tablet, Take 1 tablet (25 mg total) by mouth daily., Disp: 90 tablet, Rfl: 3    nateglinide (STARLIX) 60 mg tablet, TAKE 1 TABLET (60 MG TOTAL) BY MOUTH 3 (THREE) TIMES A DAY BEFORE MEALS., Disp: 270 tablet, Rfl: 1      BP Readings from Last 3 Encounters:   04/15/24 122/84   03/25/24 120/78   10/19/23 120/60       Recent Lab results:  Lab Results   Component Value Date    CHOL 192 10/20/2023   ,   Lab Results   Component Value Date    HDL 44 10/20/2023   ,   Lab Results   Component Value Date    LDLCALC 100 (H) 10/20/2023   ,   Lab Results   Component Value Date    TRIG 287 (H) 10/20/2023        Lab Results   Component Value Date    GLUCOSE 128 (H) 10/20/2023   ,   Lab Results   Component Value Date    HGBA1C 8.5 (H) 04/04/2024         Lab Results   Component Value Date    CREATININE 1.86 (H) 10/20/2023       Lab Results   Component  Value Date    TSH 3.360 07/16/2016           Lab Results   Component Value Date    HGBA1C 8.5 (H) 04/04/2024

## 2024-05-29 LAB
ALBUMIN SERPL-MCNC: 4.2 G/DL (ref 3.8–4.8)
ALP SERPL-CCNC: 61 IU/L (ref 44–121)
ALT SERPL-CCNC: 11 IU/L (ref 0–32)
AST SERPL-CCNC: 10 IU/L (ref 0–40)
BILIRUB DIRECT SERPL-MCNC: 0.15 MG/DL (ref 0–0.4)
BILIRUB SERPL-MCNC: 0.6 MG/DL (ref 0–1.2)
CHOLEST SERPL-MCNC: 145 MG/DL (ref 100–199)
HDLC SERPL-MCNC: 40 MG/DL
LDLC SERPL CALC-MCNC: 80 MG/DL (ref 0–99)
PROT SERPL-MCNC: 6.7 G/DL (ref 6–8.5)
TRIGL SERPL-MCNC: 142 MG/DL (ref 0–149)
VLDLC SERPL CALC-MCNC: 25 MG/DL (ref 5–40)

## 2024-06-05 DIAGNOSIS — E11.69 TYPE 2 DIABETES MELLITUS WITH OTHER SPECIFIED COMPLICATION, WITHOUT LONG-TERM CURRENT USE OF INSULIN: ICD-10-CM

## 2024-06-05 RX ORDER — DULAGLUTIDE 0.75 MG/.5ML
INJECTION, SOLUTION SUBCUTANEOUS
Qty: 2 ML | Refills: 0 | Status: SHIPPED | OUTPATIENT
Start: 2024-06-05 | End: 2024-07-02

## 2024-06-05 NOTE — TELEPHONE ENCOUNTER
Medicine Refill Request    Last Office Visit: 3/25/2024   Last Consult Visit: Visit date not found  Last Telemedicine Visit: Visit date not found    Next Appointment: Visit date not found      Current Outpatient Medications:     ALPRAZolam (XANAX) 0.25 mg tablet, TAKE 1 TABLET BY MOUTH TWICE A DAY AS NEEDED FOR ANXIETY OR SLEEP, Disp: 60 tablet, Rfl: 2    amLODIPine (NORVASC) 5 mg tablet, Take 1 tablet (5 mg total) by mouth daily., Disp: 90 tablet, Rfl: 3    atorvastatin (LIPITOR) 40 mg tablet, Take 1 tablet (40 mg total) by mouth daily., Disp: 90 tablet, Rfl: 3    cholecalciferol, vitamin D3, 1,000 unit (25 mcg) tablet, take 2 tablets daily, Disp: , Rfl:     dulaglutide 0.75 mg/0.5 mL pen injector, Inject 0.5 mL (0.75 mg total) under the skin once a week. Do not prime., Disp: 2 mL, Rfl: 1    fosinopriL (MONOPRIL) 40 mg tablet, Take 1 tablet (40 mg total) by mouth daily., Disp: 90 tablet, Rfl: 1    hydrochlorothiazide (HYDRODIURIL) 25 mg tablet, Take 1 tablet (25 mg total) by mouth 3 (three) times a week (Mon, Wed, Fri)., Disp: , Rfl:     metoprolol succinate XL (TOPROL-XL) 25 mg 24 hr tablet, Take 1 tablet (25 mg total) by mouth daily., Disp: 90 tablet, Rfl: 3    nateglinide (STARLIX) 60 mg tablet, Take 1 tablet (60 mg total) by mouth 3 (three) times a day before meals., Disp: 270 tablet, Rfl: 3      BP Readings from Last 3 Encounters:   04/15/24 122/84   03/25/24 120/78   10/19/23 120/60       Recent Lab results:  Lab Results   Component Value Date    CHOL 145 05/28/2024   ,   Lab Results   Component Value Date    HDL 40 05/28/2024   ,   Lab Results   Component Value Date    LDLCALC 80 05/28/2024   ,   Lab Results   Component Value Date    TRIG 142 05/28/2024        Lab Results   Component Value Date    GLUCOSE 128 (H) 10/20/2023   ,   Lab Results   Component Value Date    HGBA1C 8.5 (H) 04/04/2024         Lab Results   Component Value Date    CREATININE 1.86 (H) 10/20/2023       Lab Results   Component Value Date     TSH 3.360 07/16/2016           Lab Results   Component Value Date    HGBA1C 8.5 (H) 04/04/2024

## 2024-06-07 RX ORDER — ALPRAZOLAM 0.25 MG/1
0.25 TABLET ORAL 2 TIMES DAILY PRN
Qty: 60 TABLET | Refills: 0 | Status: SHIPPED | OUTPATIENT
Start: 2024-06-07 | End: 2024-08-07

## 2024-06-07 NOTE — TELEPHONE ENCOUNTER
Medicine Refill Request    Last Office Visit: 3/25/2024   Last Consult Visit: Visit date not found  Last Telemedicine Visit: Visit date not found    Next Appointment: Visit date not found      Current Outpatient Medications:     ALPRAZolam (XANAX) 0.25 mg tablet, TAKE 1 TABLET BY MOUTH TWICE A DAY AS NEEDED FOR ANXIETY OR SLEEP, Disp: 60 tablet, Rfl: 2    amLODIPine (NORVASC) 5 mg tablet, Take 1 tablet (5 mg total) by mouth daily., Disp: 90 tablet, Rfl: 3    atorvastatin (LIPITOR) 40 mg tablet, Take 1 tablet (40 mg total) by mouth daily., Disp: 90 tablet, Rfl: 3    cholecalciferol, vitamin D3, 1,000 unit (25 mcg) tablet, take 2 tablets daily, Disp: , Rfl:     dulaglutide (TRULICITY) 0.75 mg/0.5 mL pen injector, INJECT 0.5 ML (0.75 MG TOTAL) UNDER THE SKIN ONCE A WEEK. DO NOT PRIME., Disp: 2 mL, Rfl: 0    fosinopriL (MONOPRIL) 40 mg tablet, Take 1 tablet (40 mg total) by mouth daily., Disp: 90 tablet, Rfl: 1    hydrochlorothiazide (HYDRODIURIL) 25 mg tablet, Take 1 tablet (25 mg total) by mouth 3 (three) times a week (Mon, Wed, Fri)., Disp: , Rfl:     metoprolol succinate XL (TOPROL-XL) 25 mg 24 hr tablet, Take 1 tablet (25 mg total) by mouth daily., Disp: 90 tablet, Rfl: 3    nateglinide (STARLIX) 60 mg tablet, Take 1 tablet (60 mg total) by mouth 3 (three) times a day before meals., Disp: 270 tablet, Rfl: 3      BP Readings from Last 3 Encounters:   04/15/24 122/84   03/25/24 120/78   10/19/23 120/60       Recent Lab results:  Lab Results   Component Value Date    CHOL 145 05/28/2024   ,   Lab Results   Component Value Date    HDL 40 05/28/2024   ,   Lab Results   Component Value Date    LDLCALC 80 05/28/2024   ,   Lab Results   Component Value Date    TRIG 142 05/28/2024        Lab Results   Component Value Date    GLUCOSE 128 (H) 10/20/2023   ,   Lab Results   Component Value Date    HGBA1C 8.5 (H) 04/04/2024         Lab Results   Component Value Date    CREATININE 1.86 (H) 10/20/2023       Lab Results    Component Value Date    TSH 3.360 07/16/2016           Lab Results   Component Value Date    HGBA1C 8.5 (H) 04/04/2024

## 2024-07-01 RX ORDER — FOSINOPRIL SODIUM 40 MG/1
40 TABLET ORAL DAILY
Qty: 90 TABLET | Refills: 0 | Status: SHIPPED | OUTPATIENT
Start: 2024-07-01 | End: 2024-09-30

## 2024-07-01 NOTE — TELEPHONE ENCOUNTER
Is This A New Presentation, Or A Follow-Up?: Follow Up [facility] Refill Request    Last Office Visit: 3/25/2024   Last Consult Visit: Visit date not found  Last Telemedicine Visit: Visit date not found    Next Appointment: Visit date not found      Current Outpatient Medications:     ALPRAZolam (XANAX) 0.25 mg tablet, Take 1 tablet (0.25 mg total) by mouth 2 (two) times a day as needed for anxiety or sleep. TAKE 1 TABLET BY MOUTH TWICE A DAY AS NEEDED FOR ANXIETY OR SLEEP, Disp: 60 tablet, Rfl: 0    amLODIPine (NORVASC) 5 mg tablet, Take 1 tablet (5 mg total) by mouth daily., Disp: 90 tablet, Rfl: 3    atorvastatin (LIPITOR) 40 mg tablet, Take 1 tablet (40 mg total) by mouth daily., Disp: 90 tablet, Rfl: 3    cholecalciferol, vitamin D3, 1,000 unit (25 mcg) tablet, take 2 tablets daily, Disp: , Rfl:     dulaglutide (TRULICITY) 0.75 mg/0.5 mL pen injector, INJECT 0.5 ML (0.75 MG TOTAL) UNDER THE SKIN ONCE A WEEK. DO NOT PRIME., Disp: 2 mL, Rfl: 0    fosinopriL (MONOPRIL) 40 mg tablet, Take 1 tablet (40 mg total) by mouth daily., Disp: 90 tablet, Rfl: 1    hydrochlorothiazide (HYDRODIURIL) 25 mg tablet, Take 1 tablet (25 mg total) by mouth 3 (three) times a week (Mon, Wed, Fri)., Disp: , Rfl:     metoprolol succinate XL (TOPROL-XL) 25 mg 24 hr tablet, Take 1 tablet (25 mg total) by mouth daily., Disp: 90 tablet, Rfl: 3    nateglinide (STARLIX) 60 mg tablet, Take 1 tablet (60 mg total) by mouth 3 (three) times a day before meals., Disp: 270 tablet, Rfl: 3      BP Readings from Last 3 Encounters:   04/15/24 122/84   03/25/24 120/78   10/19/23 120/60       Recent Lab results:  Lab Results   Component Value Date    CHOL 145 05/28/2024   ,   Lab Results   Component Value Date    HDL 40 05/28/2024   ,   Lab Results   Component Value Date    LDLCALC 80 05/28/2024   ,   Lab Results   Component Value Date    TRIG 142 05/28/2024        Lab Results   Component Value Date    GLUCOSE 128 (H) 10/20/2023   ,   Lab Results   Component Value Date    HGBA1C 8.5 (H) 04/04/2024         Lab  Results   Component Value Date    CREATININE 1.86 (H) 10/20/2023       Lab Results   Component Value Date    TSH 3.360 07/16/2016           Lab Results   Component Value Date    HGBA1C 8.5 (H) 04/04/2024

## 2024-07-02 DIAGNOSIS — E11.69 TYPE 2 DIABETES MELLITUS WITH OTHER SPECIFIED COMPLICATION, WITHOUT LONG-TERM CURRENT USE OF INSULIN: ICD-10-CM

## 2024-07-02 RX ORDER — DULAGLUTIDE 0.75 MG/.5ML
INJECTION, SOLUTION SUBCUTANEOUS
Qty: 2 ML | Refills: 0 | Status: SHIPPED | OUTPATIENT
Start: 2024-07-02 | End: 2024-07-29

## 2024-07-02 NOTE — TELEPHONE ENCOUNTER
Medicine Refill Request    Last Office Visit: 3/25/2024   Last Consult Visit: Visit date not found  Last Telemedicine Visit: Visit date not found    Next Appointment: Visit date not found      Current Outpatient Medications:     ALPRAZolam (XANAX) 0.25 mg tablet, Take 1 tablet (0.25 mg total) by mouth 2 (two) times a day as needed for anxiety or sleep. TAKE 1 TABLET BY MOUTH TWICE A DAY AS NEEDED FOR ANXIETY OR SLEEP, Disp: 60 tablet, Rfl: 0    amLODIPine (NORVASC) 5 mg tablet, Take 1 tablet (5 mg total) by mouth daily., Disp: 90 tablet, Rfl: 3    atorvastatin (LIPITOR) 40 mg tablet, Take 1 tablet (40 mg total) by mouth daily., Disp: 90 tablet, Rfl: 3    cholecalciferol, vitamin D3, 1,000 unit (25 mcg) tablet, take 2 tablets daily, Disp: , Rfl:     dulaglutide (TRULICITY) 0.75 mg/0.5 mL pen injector, INJECT 0.5 ML (0.75 MG TOTAL) UNDER THE SKIN ONCE A WEEK. DO NOT PRIME., Disp: 2 mL, Rfl: 0    fosinopriL (MONOPRIL) 40 mg tablet, TAKE 1 TABLET BY MOUTH EVERY DAY, Disp: 90 tablet, Rfl: 0    hydrochlorothiazide (HYDRODIURIL) 25 mg tablet, Take 1 tablet (25 mg total) by mouth 3 (three) times a week (Mon, Wed, Fri)., Disp: , Rfl:     metoprolol succinate XL (TOPROL-XL) 25 mg 24 hr tablet, Take 1 tablet (25 mg total) by mouth daily., Disp: 90 tablet, Rfl: 3    nateglinide (STARLIX) 60 mg tablet, Take 1 tablet (60 mg total) by mouth 3 (three) times a day before meals., Disp: 270 tablet, Rfl: 3      BP Readings from Last 3 Encounters:   04/15/24 122/84   03/25/24 120/78   10/19/23 120/60       Recent Lab results:  Lab Results   Component Value Date    CHOL 145 05/28/2024   ,   Lab Results   Component Value Date    HDL 40 05/28/2024   ,   Lab Results   Component Value Date    LDLCALC 80 05/28/2024   ,   Lab Results   Component Value Date    TRIG 142 05/28/2024        Lab Results   Component Value Date    GLUCOSE 128 (H) 10/20/2023   ,   Lab Results   Component Value Date    HGBA1C 8.5 (H) 04/04/2024         Lab Results    Component Value Date    CREATININE 1.86 (H) 10/20/2023       Lab Results   Component Value Date    TSH 3.360 07/16/2016           Lab Results   Component Value Date    HGBA1C 8.5 (H) 04/04/2024

## 2024-07-29 DIAGNOSIS — E11.69 TYPE 2 DIABETES MELLITUS WITH OTHER SPECIFIED COMPLICATION, WITHOUT LONG-TERM CURRENT USE OF INSULIN: ICD-10-CM

## 2024-07-29 RX ORDER — DULAGLUTIDE 0.75 MG/.5ML
INJECTION, SOLUTION SUBCUTANEOUS
Qty: 2 ML | Refills: 3 | Status: SHIPPED | OUTPATIENT
Start: 2024-07-29 | End: 2024-09-29 | Stop reason: SINTOL

## 2024-07-29 NOTE — TELEPHONE ENCOUNTER
Please let patient know that I did refill the Trulicity but she is due for a follow-up appointment in October and we need to get a hemoglobin A1c at that time (nonfasting blood work) to see how her blood sugars responding to the medicine.

## 2024-09-26 ENCOUNTER — OFFICE VISIT (OUTPATIENT)
Dept: FAMILY MEDICINE | Facility: CLINIC | Age: 74
End: 2024-09-26
Payer: MEDICARE

## 2024-09-26 VITALS
RESPIRATION RATE: 16 BRPM | SYSTOLIC BLOOD PRESSURE: 142 MMHG | TEMPERATURE: 97.3 F | HEIGHT: 64 IN | DIASTOLIC BLOOD PRESSURE: 88 MMHG | OXYGEN SATURATION: 98 % | WEIGHT: 158.2 LBS | BODY MASS INDEX: 27.01 KG/M2 | HEART RATE: 70 BPM

## 2024-09-26 DIAGNOSIS — E78.2 MIXED HYPERLIPIDEMIA: ICD-10-CM

## 2024-09-26 DIAGNOSIS — E11.69 TYPE 2 DIABETES MELLITUS WITH OTHER SPECIFIED COMPLICATION, WITHOUT LONG-TERM CURRENT USE OF INSULIN: Primary | ICD-10-CM

## 2024-09-26 DIAGNOSIS — R92.8 ABNORMAL MAMMOGRAM: ICD-10-CM

## 2024-09-26 DIAGNOSIS — I10 BENIGN ESSENTIAL HYPERTENSION: ICD-10-CM

## 2024-09-26 DIAGNOSIS — Z23 IMMUNIZATION DUE: ICD-10-CM

## 2024-09-26 PROCEDURE — G0008 ADMIN INFLUENZA VIRUS VAC: HCPCS | Performed by: FAMILY MEDICINE

## 2024-09-26 PROCEDURE — G8754 DIAS BP LESS 90: HCPCS | Performed by: FAMILY MEDICINE

## 2024-09-26 PROCEDURE — 90662 IIV NO PRSV INCREASED AG IM: CPT | Performed by: FAMILY MEDICINE

## 2024-09-26 PROCEDURE — 99214 OFFICE O/P EST MOD 30 MIN: CPT | Mod: 25 | Performed by: FAMILY MEDICINE

## 2024-09-26 PROCEDURE — G8753 SYS BP > OR = 140: HCPCS | Performed by: FAMILY MEDICINE

## 2024-09-26 ASSESSMENT — MINI COG
TOTAL SCORE: 5
COMPLETED: YES

## 2024-09-26 ASSESSMENT — PATIENT HEALTH QUESTIONNAIRE - PHQ9: SUM OF ALL RESPONSES TO PHQ9 QUESTIONS 1 & 2: 0

## 2024-09-26 NOTE — PROGRESS NOTES
Subjective      Patient ID: Vianney Bone is a 73 y.o. female.    Patient here for follow-up of chronic conditions.  She is followed for diabetes hypertension and hyperlipidemia.  Although patient's blood work shows hemoglobin A1c is down to 6.7 she does not like the trulicity medication, nausea feeling significantly after each injection which last for a few days so she is feeling poorly more days than not.  Reviewed over other medicines that we have tried.  Otherwise she is feeling stable.  Tries to stay active and monitors her diet.         Tobacco  Allergies  Meds  Problems  Med Hx  Surg Hx  Fam Hx       Review of Systems   Constitutional:  Negative for activity change and unexpected weight change.   Eyes:  Negative for visual disturbance.   Respiratory:  Negative for shortness of breath.    Cardiovascular:  Negative for chest pain, palpitations and leg swelling.   Gastrointestinal:  Positive for nausea.   Endocrine: Negative for polydipsia, polyphagia and polyuria.   Genitourinary:  Negative for difficulty urinating.     No Known Allergies  Current Outpatient Medications   Medication Sig Dispense Refill    ALPRAZolam (XANAX) 0.25 mg tablet Take 1 tablet (0.25 mg total) by mouth 2 (two) times a day as needed for anxiety. 60 tablet 2    amLODIPine (NORVASC) 5 mg tablet Take 1 tablet (5 mg total) by mouth daily. 90 tablet 3    atorvastatin (LIPITOR) 40 mg tablet Take 1 tablet (40 mg total) by mouth daily. 90 tablet 3    cholecalciferol, vitamin D3, 1,000 unit (25 mcg) tablet take 2 tablets daily      dulaglutide (TRULICITY) 0.75 mg/0.5 mL pen injector INJECT 0.5 ML (0.75 MG TOTAL) UNDER THE SKIN ONCE A WEEK. DO NOT PRIME. 2 mL 3    fosinopriL (MONOPRIL) 40 mg tablet TAKE 1 TABLET BY MOUTH EVERY DAY 90 tablet 0    metoprolol succinate XL (TOPROL-XL) 25 mg 24 hr tablet Take 1 tablet (25 mg total) by mouth daily. 90 tablet 3    nateglinide (STARLIX) 60 mg tablet Take 1 tablet (60 mg total) by mouth 3 (three)  "times a day before meals. 270 tablet 3    SITagliptin phosphate (JANUVIA) 100 mg tablet Take 1 tablet (100 mg total) by mouth daily. 90 tablet 1     No current facility-administered medications for this visit.     Past Medical History:   Diagnosis Date    Diabetes mellitus (CMS/HCC) 1/3/2011    Overview:     Hypertension     Mixed hyperlipidemia 1/3/2011    Overview:     Squamous cell cancer of skin of nose 6/27/2018     Past Surgical History   Procedure Laterality Date    Squamous cell carcinoma excision       Social History     Tobacco Use    Smoking status: Never    Smokeless tobacco: Never   Vaping Use    Vaping status: Never Used   Substance Use Topics    Alcohol use: No     Family History   Problem Relation Name Age of Onset    Hypertension Biological Mother      Heart disease Biological Father      Stroke Biological Father      Heart disease Biological Brother Hernandez     Ovarian cancer Biological Sister      Bipolar disorder Biological Sister      Rheum arthritis Biological Sister      Aneurysm Biological Son         Objective   Vitals:    09/26/24 1133 09/26/24 1157   BP: (!) 159/85 (!) 142/88   BP Location: Left upper arm    Patient Position: Sitting    Pulse: 70    Resp: 16    Temp: 36.3 °C (97.3 °F)    TempSrc: Axillary    SpO2: 98%    Weight: 71.8 kg (158 lb 3.2 oz)    Height: 1.638 m (5' 4.49\")     Body mass index is 26.75 kg/m².  Physical Exam  Constitutional:       General: She is not in acute distress.     Appearance: Normal appearance.   Cardiovascular:      Rate and Rhythm: Normal rate and regular rhythm.      Heart sounds: No murmur heard.  Pulmonary:      Effort: Pulmonary effort is normal. No respiratory distress.      Breath sounds: Normal breath sounds.   Abdominal:      General: Bowel sounds are normal.   Neurological:      Mental Status: She is alert.             Assessment/Plan   Problem List Items Addressed This Visit          Circulatory    Benign essential hypertension       " Endocrine/Metabolic    Type 2 diabetes mellitus with other specified complication (CMS/HCC) - Primary    Relevant Medications    SITagliptin phosphate (JANUVIA) 100 mg tablet       Other    Mixed hyperlipidemia     Other Visit Diagnoses       Immunization due        Relevant Orders    Influenza vaccine high dose trivalent 65 and older IM (Fluzone High Dose) (Completed)    Abnormal mammogram        Relevant Orders    BI DIAGNOSTIC MAMMOGRAM RIGHT (TOMOSYNTHESIS)    ULTRASOUND BREAST LIMITED RIGHT        Discussed management of diabetes.  Patient unable to tolerate the Trulicity.  Rx Januvia and follow-up blood work in 3 to 6 months.  Continue on current cardiac medicine but monitor blood pressure at home as it was slightly elevated today in the office.  Patient due for follow-up mammogram prescription given.  Flu shot given.  Recommend follow-up in 6 months.    Patient agrees and verbalizes understanding of medication and treatment plan discussed at today's visit.  Patient was instructed to call or follow-up if symptoms persist or worsen.         Monique Mcdonald, DO    This note was created with voice recognition software. Inadvertent dictation errors should be disregarded. Please contact my office for clarification.

## 2024-09-29 DIAGNOSIS — E78.2 MIXED HYPERLIPIDEMIA: Primary | ICD-10-CM

## 2024-09-29 ASSESSMENT — ENCOUNTER SYMPTOMS
SHORTNESS OF BREATH: 0
DIFFICULTY URINATING: 0
POLYDIPSIA: 0
ACTIVITY CHANGE: 0
PALPITATIONS: 0
POLYPHAGIA: 0
UNEXPECTED WEIGHT CHANGE: 0
NAUSEA: 1

## 2024-09-30 RX ORDER — ATORVASTATIN CALCIUM 40 MG/1
40 TABLET, FILM COATED ORAL DAILY
Qty: 30 TABLET | Refills: 0 | Status: SHIPPED | OUTPATIENT
Start: 2024-09-30 | End: 2024-10-28

## 2024-09-30 RX ORDER — FOSINOPRIL SODIUM 40 MG/1
40 TABLET ORAL DAILY
Qty: 90 TABLET | Refills: 3 | Status: SHIPPED | OUTPATIENT
Start: 2024-09-30

## 2024-09-30 NOTE — TELEPHONE ENCOUNTER
Medicine Refill Request    Last Office Visit: 9/26/2024   Last Consult Visit: Visit date not found  Last Telemedicine Visit: Visit date not found    Next Appointment: Visit date not found      Current Outpatient Medications:     ALPRAZolam (XANAX) 0.25 mg tablet, Take 1 tablet (0.25 mg total) by mouth 2 (two) times a day as needed for anxiety., Disp: 60 tablet, Rfl: 2    amLODIPine (NORVASC) 5 mg tablet, Take 1 tablet (5 mg total) by mouth daily., Disp: 90 tablet, Rfl: 3    atorvastatin (LIPITOR) 40 mg tablet, Take 1 tablet (40 mg total) by mouth daily., Disp: 90 tablet, Rfl: 3    cholecalciferol, vitamin D3, 1,000 unit (25 mcg) tablet, take 2 tablets daily, Disp: , Rfl:     fosinopriL (MONOPRIL) 40 mg tablet, TAKE 1 TABLET BY MOUTH EVERY DAY, Disp: 90 tablet, Rfl: 0    metoprolol succinate XL (TOPROL-XL) 25 mg 24 hr tablet, Take 1 tablet (25 mg total) by mouth daily., Disp: 90 tablet, Rfl: 3    nateglinide (STARLIX) 60 mg tablet, Take 1 tablet (60 mg total) by mouth 3 (three) times a day before meals., Disp: 270 tablet, Rfl: 3    SITagliptin phosphate (JANUVIA) 100 mg tablet, Take 1 tablet (100 mg total) by mouth daily., Disp: 90 tablet, Rfl: 1      BP Readings from Last 3 Encounters:   09/26/24 (!) 142/88   04/15/24 122/84   03/25/24 120/78       Recent Lab results:  Lab Results   Component Value Date    CHOL 145 05/28/2024   ,   Lab Results   Component Value Date    HDL 40 05/28/2024   ,   Lab Results   Component Value Date    LDLCALC 80 05/28/2024   ,   Lab Results   Component Value Date    TRIG 142 05/28/2024        Lab Results   Component Value Date    GLUCOSE 128 (H) 10/20/2023   ,   Lab Results   Component Value Date    HGBA1C 6.9 (H) 09/18/2024         Lab Results   Component Value Date    CREATININE 1.86 (H) 10/20/2023       Lab Results   Component Value Date    TSH 3.360 07/16/2016           Lab Results   Component Value Date    HGBA1C 6.9 (H) 09/18/2024

## 2024-10-11 ENCOUNTER — TELEPHONE (OUTPATIENT)
Dept: CARDIOLOGY | Facility: CLINIC | Age: 74
End: 2024-10-11
Payer: MEDICARE

## 2024-10-14 ENCOUNTER — OFFICE VISIT (OUTPATIENT)
Dept: CARDIOLOGY | Facility: CLINIC | Age: 74
End: 2024-10-14
Payer: MEDICARE

## 2024-10-14 VITALS
SYSTOLIC BLOOD PRESSURE: 126 MMHG | BODY MASS INDEX: 26.33 KG/M2 | HEART RATE: 68 BPM | DIASTOLIC BLOOD PRESSURE: 76 MMHG | RESPIRATION RATE: 16 BRPM | OXYGEN SATURATION: 97 % | HEIGHT: 65 IN | WEIGHT: 158 LBS

## 2024-10-14 DIAGNOSIS — N18.31 STAGE 3A CHRONIC KIDNEY DISEASE (CMS/HCC): ICD-10-CM

## 2024-10-14 DIAGNOSIS — I10 BENIGN ESSENTIAL HYPERTENSION: ICD-10-CM

## 2024-10-14 DIAGNOSIS — E11.69 TYPE 2 DIABETES MELLITUS WITH OTHER SPECIFIED COMPLICATION, WITHOUT LONG-TERM CURRENT USE OF INSULIN: ICD-10-CM

## 2024-10-14 DIAGNOSIS — Z87.59 HISTORY OF PRE-ECLAMPSIA: ICD-10-CM

## 2024-10-14 DIAGNOSIS — E78.2 MIXED HYPERLIPIDEMIA: Primary | ICD-10-CM

## 2024-10-14 LAB
ATRIAL RATE: 68
P AXIS: 68
PR INTERVAL: 172
QRS DURATION: 84
QT INTERVAL: 408
QTC CALCULATION(BAZETT): 433
R AXIS: 54
T WAVE AXIS: 15
VENTRICULAR RATE: 68

## 2024-10-14 PROCEDURE — G8754 DIAS BP LESS 90: HCPCS | Performed by: STUDENT IN AN ORGANIZED HEALTH CARE EDUCATION/TRAINING PROGRAM

## 2024-10-14 PROCEDURE — G8752 SYS BP LESS 140: HCPCS | Performed by: STUDENT IN AN ORGANIZED HEALTH CARE EDUCATION/TRAINING PROGRAM

## 2024-10-14 PROCEDURE — 93000 ELECTROCARDIOGRAM COMPLETE: CPT | Performed by: STUDENT IN AN ORGANIZED HEALTH CARE EDUCATION/TRAINING PROGRAM

## 2024-10-14 PROCEDURE — 99214 OFFICE O/P EST MOD 30 MIN: CPT | Performed by: STUDENT IN AN ORGANIZED HEALTH CARE EDUCATION/TRAINING PROGRAM

## 2024-10-14 NOTE — ASSESSMENT & PLAN NOTE
5/28/2024 lipid panel: Total cholesterol 145 triglyceride 142 HDL 40 LDL 80  5/26/2023 lipid panel: Total cholesterol 163 triglycerides 179 HDL 46 LDL 86  10/5/2021 lipid panel: Total cholesterol 131 triglycerides 251 HDL 41 LDL 51  --Continue atorvastatin

## 2024-10-14 NOTE — PATIENT INSTRUCTIONS
Dr Janet Bhat MD    [facility]  [address], [address]  [place] [address]        Call [phone]

## 2024-10-14 NOTE — ASSESSMENT & PLAN NOTE
5/26/2023 hemoglobin A1c 7.0%  9/2024 hemoglobin A1c 6.9%  --Glucose is managed by her primary care physician.  I did recommend endocrinology today as previously mentioned by her primary care physician to help her diabetes given medication intolerance/costly medication  -- Continue rosuvastatin 40 mg daily

## 2024-10-14 NOTE — PROGRESS NOTES
Minnie Renee,   Cardiology    [facility]  [facility] [address]    [phone]    [email or web address]     10/14/2024     Reason for visit: Cardiovascular follow-up    Vianney Bone is a 73 y.o. female who presents for cardiovascular follow-up. Vianney has a history of hypertension, hyperlipidemia, and diabetes mellitus type 2. I last saw her in the office in April of this year at which time she was feeling well.  I recommend continued patient of her current cardiac medications.    She denies cardiac symptoms including chest discomfort, shortness of breath, palpitations, lower extremity edema, presyncope and syncope. She is still struggling to get on a good medication regimen for her diabetes due to medication cost and availability.    Past Medical History:  1. Hypertension  2. Preeclampsia, term  3. Diabetes mellitus, type II  4. Hyperlipidemia  5. CKD  6. Insomnia   7. Squamous cell carcinoma  8. Obstetrics history     Past Surgical History:  1. Mohs surgery    Medications:   Current Outpatient Medications:     ALPRAZolam (XANAX) 0.25 mg tablet, Take 1 tablet (0.25 mg total) by mouth 2 (two) times a day as needed for anxiety., Disp: 60 tablet, Rfl: 2    amLODIPine (NORVASC) 5 mg tablet, Take 1 tablet (5 mg total) by mouth daily., Disp: 90 tablet, Rfl: 3    atorvastatin (LIPITOR) 40 mg tablet, Take 1 tablet (40 mg total) by mouth daily., Disp: 30 tablet, Rfl: 0    cholecalciferol, vitamin D3, 1,000 unit (25 mcg) tablet, take 2 tablets daily, Disp: , Rfl:     dulaglutide 0.75 mg/0.5 mL pen injector, Inject 0.5 mL (0.75 mg total) under the skin once a week. Do not prime., Disp: 2 mL, Rfl: 5    fosinopriL (MONOPRIL) 40 mg tablet, TAKE 1 TABLET BY MOUTH EVERY DAY, Disp: 90 tablet, Rfl: 3    metoprolol succinate XL (TOPROL-XL) 25 mg 24 hr tablet, Take 1 tablet (25 mg total) by mouth daily., Disp: 90 tablet, Rfl: 3    nateglinide (STARLIX) 60 mg tablet, Take 1  tablet (60 mg total) by mouth 3 (three) times a day before meals., Disp: 270 tablet, Rfl: 3      Allergies: Patient has no known allergies.    Social History: Retired. Worked at [employer] for 28 years.  .  Never smoker.  Denies alcohol use.  Denies illicit drug use.    Family History: Reviewed and significant for father with coronary artery disease with bypass surgery and multiple strokes.  Brother with coronary artery disease and bypass surgery at the age of 65.  Almost all of her 8 siblings of hypertension.  Son with suspected brain aneurysm.    Exam:  Objective   Vitals:    10/14/24 0928   BP: 126/76   Pulse: 68   Resp: 16   SpO2: 97%         Body mass index is 26.7 kg/m².  Physical Exam  Constitutional:       Appearance: Normal appearance.   HENT:      Head: Normocephalic and atraumatic.   Eyes:      General: No scleral icterus.  Neck:      Vascular: No JVD.   Cardiovascular:      Rate and Rhythm: Normal rate and regular rhythm.      Heart sounds: No murmur heard.  Pulmonary:      Effort: Pulmonary effort is normal. No respiratory distress.      Breath sounds: Normal breath sounds. No wheezing, rhonchi or rales.   Abdominal:      General: There is no distension.      Palpations: Abdomen is soft.   Musculoskeletal:      Right lower leg: No edema.      Left lower leg: No edema.   Skin:     General: Skin is warm and dry.   Neurological:      Mental Status: She is alert and oriented to person, place, and time.   Psychiatric:         Mood and Affect: Mood normal.         Behavior: Behavior normal.         Labs:  Lab Results   Component Value Date    WBC 8.3 10/20/2023    HGB 12.7 10/20/2023     10/20/2023    CHOL 145 05/28/2024    TRIG 142 05/28/2024    HDL 40 05/28/2024    ALT 11 05/28/2024    AST 10 05/28/2024     10/20/2023    K 4.2 10/20/2023    CREATININE 1.86 (H) 10/20/2023    TSH 3.360 07/16/2016    HGBA1C 6.9 (H) 09/18/2024    MICROALBUR 9.7 10/21/2023   10/5/2021 lipid panel: Total cholesterol  131 triglycerides 251 HDL 41 LDL 51  5/26/2023 lipid panel: Total cholesterol 163 triglycerides 179 HDL 46 LDL 86  5/28/2024 lipid panel: Total cholesterol 145 triglyceride 142 HDL 40 LDL 80  Personally reviewed, my interpretation: Hypertriglyceridemia, CKD    Cardiovascular Studies:   1. Echocardiogram 11/9/2021 (personally reviewed): Technically difficult study. Preserved LV systolic function. Mild concentric left ventricular hypertrophy. Estimated EF 65%. Wall motion appears grossly normal. Indeterminate diastolic function. Normal-sized RV. Normal RV systolic function. Normal-sized LA. Normal-sized RA. Tricuspid aortic valve. Sclerotic aortic valve leaflets. No aortic valve regurgitation. Thickened mitral valve leaflets. Mild mitral annular calcification. No appreciable mitral valve regurgitation. Trace tricuspid valve regurgitation. Estimated RVSP = 27 mmHg. Grossly normal pulmonic valve structure. Trace pulmonic valve regurgitation. Normal-sized IVC. IVC collapses >50% during inspiration. No significant pericardial effusion.  2. Stress echo 4/19/2012: Patient exercised for 7 minutes and 33 seconds.  No chest pain.  Stress EKG showed exaggeration of baseline ST depressions and new T wave inversions.  Stress echo showed no ischemia.  Resting echo showed normal left ventricular size and wall thickness.  LVEF was 65%.  Aortic valve trileaflet.  Trace mitral regurgitation and tricuspid regurgitation.  PA pressure 60 mmHg plus right atrial pressure.    ECG performed today and discussed with patient showing sinus rhythm with nonspecific ST segment abnormalities    Assessment/Plan   Problem List Items Addressed This Visit       Benign essential hypertension     Blood pressure in the office today acceptable measuring 136/84 mmHg  --Continue metoprolol succinate 25 mg daily, amlodipine 5 mg, and fosinopril 40 mg daily         Type 2 diabetes mellitus with other specified complication (CMS/[facility])     5/26/2023 hemoglobin  A1c 7.0%  2024 hemoglobin A1c 6.9%  --Glucose is managed by her primary care physician.  I did recommend endocrinology today as previously mentioned by her primary care physician to help her diabetes given medication intolerance/costly medication  -- Continue rosuvastatin 40 mg daily         Mixed hyperlipidemia - Primary     2024 lipid panel: Total cholesterol 145 triglyceride 142 HDL 40 LDL 80  2023 lipid panel: Total cholesterol 163 triglycerides 179 HDL 46 LDL 86  10/5/2021 lipid panel: Total cholesterol 131 triglycerides 251 HDL 41 LDL 51  --Continue atorvastatin         Relevant Orders    [facility] MUSE ECG 12 lead (clinic performed) (Completed)    Lipid panel    Hepatic function panel    History of pre-eclampsia     Preeclampsia is a sex-specific cardiovascular risk factor and is associated with premature cardiovascular disease (CVD) including stroke, myocardial infarction, and heart failure with preserved ejection fraction.  --Reviewed that women with a history of adverse pregnancy outcome (APO), including hypertensive disorders of pregnancy (preeclampsia and gestational hypertension), gestational diabetes,  birth, and small for gestational age infant are at increased risk of future cardiovascular events and risk factor development.   --Plan for aggressive lifelong risk factor optimization: Lifestyle modification discussed, including the importance of heart healthy diet and regular aerobic exercise. Ensure adequate blood pressure control with goal less than 130/80 mmHg. Plan for annual fasting glucose and lipid monitoring with target LDL less than 100.         Stage 3 chronic kidney disease (CMS/[facility])     Baseline creatinine 1.6 to 1.8                  Minnie Renee,     This letter was generated using speech recognition software. Please excuse any typographical errors.    Return in about 1 year (around 10/14/2025).

## 2024-10-14 NOTE — ASSESSMENT & PLAN NOTE
Blood pressure in the office today acceptable measuring 136/84 mmHg  --Continue metoprolol succinate 25 mg daily, amlodipine 5 mg, and fosinopril 40 mg daily

## 2024-10-27 DIAGNOSIS — E78.2 MIXED HYPERLIPIDEMIA: ICD-10-CM

## 2024-10-28 RX ORDER — ATORVASTATIN CALCIUM 40 MG/1
40 TABLET, FILM COATED ORAL DAILY
Qty: 30 TABLET | Refills: 3 | Status: SHIPPED | OUTPATIENT
Start: 2024-10-28 | End: 2025-03-03

## 2024-10-31 DIAGNOSIS — I10 BENIGN ESSENTIAL HYPERTENSION: ICD-10-CM

## 2024-10-31 RX ORDER — AMLODIPINE BESYLATE 5 MG/1
5 TABLET ORAL DAILY
Qty: 90 TABLET | Refills: 3 | Status: SHIPPED | OUTPATIENT
Start: 2024-10-31

## 2024-11-04 ENCOUNTER — HOSPITAL ENCOUNTER (OUTPATIENT)
Dept: RADIOLOGY | Facility: HOSPITAL | Age: 74
Discharge: HOME | End: 2024-11-04
Attending: FAMILY MEDICINE
Payer: MEDICARE

## 2024-11-04 DIAGNOSIS — R92.8 ABNORMAL MAMMOGRAM: ICD-10-CM

## 2024-11-04 PROCEDURE — 76642 ULTRASOUND BREAST LIMITED: CPT | Mod: RT

## 2024-11-04 PROCEDURE — G0279 TOMOSYNTHESIS, MAMMO: HCPCS | Mod: RT

## 2024-11-04 NOTE — RESULT ENCOUNTER NOTE
Tommy Faith, the area in the right breast looks to be a complicated cyst so they would like to have a follow-up ultrasound done in 6 months when you are due for your mammogram.  Monique Mcdonald

## 2024-11-18 DIAGNOSIS — I10 BENIGN ESSENTIAL HYPERTENSION: ICD-10-CM

## 2024-11-19 DIAGNOSIS — I10 BENIGN ESSENTIAL HYPERTENSION: ICD-10-CM

## 2024-11-19 RX ORDER — METOPROLOL SUCCINATE 25 MG/1
25 TABLET, EXTENDED RELEASE ORAL DAILY
Qty: 90 TABLET | Refills: 3 | OUTPATIENT
Start: 2024-11-19

## 2024-11-19 RX ORDER — METOPROLOL SUCCINATE 25 MG/1
25 TABLET, EXTENDED RELEASE ORAL DAILY
Qty: 90 TABLET | Refills: 3 | Status: SHIPPED | OUTPATIENT
Start: 2024-11-19 | End: 2025-11-19

## 2024-12-26 NOTE — ASSESSMENT & PLAN NOTE
Physical Therapy Visit    Visit Type: Daily Treatment Note  Visit: 7  Referring Provider: Heidi Banegas CNP  Medical Diagnosis (from order): R51.9 - Worsening headaches  R42 - Vertigo     SUBJECTIVE                                                                                                               No migraines but still daily headache.  Today with frontal soreness but I would not call it a headache.  I do feel looser overall in my neck muscles.        OBJECTIVE                                                                                                                                       Treatment    Dry Needling:  - Consent signed: yes  - Dry needling used to/for: pain relief, neuromuscular excitation and reduced myofascial dysfunction/restriction  - Dry needling utilized with electrical stimulation to enhance effects of dry needling treatment.  - Education about indications, contraindications and potential side effects completed with patient.  Screen Completed    - Precautions: local skin lesions, lyme disease, local lymphedema, severe hyperalgesia/allodynia, metal allergies: nickel and chromium, abnormal bleeding tendency, immunodeficiency and/or compromised immune system, second or third trimester of pregnancy, vascular disease, history of spontaneous pneumothorax   - Contraindications: local or systemic infections including the flu, over implants, active cancer, area of lymphatic compromise, area of lumpectomy/mastectomy, first trimester of pregnancy    Dry Needling completed and enhanced with electrical stimulation (E-Stim):  The following MTrP (myofascial trigger points) were treated today:  In supine, direct  8 X 15 mm bilateral supraorbital ridge  No E-stim utilized. Needles left in place 7 min  1 X 30 mm R SCM, threaded  Electrical Stimulation: 1 minute low frequency, 1 min medium frequency, set to patient tolerance.    All 9 needles were accounted for at end of treatment.    Comments: Dry  Stable  Continue current meds   Needling treatment performed by BREANA Tran/SHERINE-2.   Suggestions for future treatments: As needed; may not need last scheduled Dry Needling session.     Results: decreased pain and tissue softening  Reaction: no adverse reaction to treatment    Manual Therapy   Supine thoracic inlet release, suboccipital release-tighter right, sphenoid release in all planes, left torsion restricted, sphenoid compressions/decompression, temporal ear pull-mild tightness right with quick release, vertex compression for membranous release   Advised on sustained pressure or holding SCM muscles as needed 90 sec hold every couple days as needed   Reinforced resisted chin tuck at least 90 sec holds     Skilled input: verbal instruction/cues and tactile instruction/cues    Writer verbally educated and received verbal consent for hand placement, positioning of patient, and techniques to be performed today from patient for hand placement and palpation for techniques as described above and how they are pertinent to the patient's plan of care.  Home Exercise Program  Access Code: [email or web address]  URL: [email or web address]  Date: 10/24/2024  Prepared by: Arlene     Exercises  - Supine Cervical Rotation AROM on Pillow  - 1-2 x daily - 7 x weekly - 2 sets - 10 reps  - Sidelying Open Book  - 1-2 x daily - 7 x weekly - 1-2 sets - 10 reps - 5 sec hold  - AAH headache SNAG   - 1 x daily - 7 x weekly - 1 sets - 3 reps - 30 sec hold  - Seated Assisted Cervical Rotation with Towel  - 1 x daily - 7 x weekly - 1 sets - 10 reps - 5 hold  - Bird Dog  - 1 x daily - 7 x weekly - 1-2 sets - 10 reps - 5 sec hold  - Standing Row with Anchored Resistance  - 1 x daily - 7 x weekly - 2-3 sets - 10 reps  - Standing Lat Pull Down with Resistance - Elbows Bent  - 1 x daily - 7 x weekly - 2-3 sets - 10 reps  - Wall Clock with Theraband  - 1 x daily - 7 x weekly - 2 sets - 10 reps  - Supine Chin Tuck  - 2 x daily - 7 x weekly - 2 sets - 10  reps      ASSESSMENT                                                                                                            Patient responding well to combination of myofascial release and dry needling.  No migraines since last session.   Education:   - Results of above outlined education: Verbalizes understanding and Demonstrates understanding    PLAN                                                                                                                           Suggestions for next session as indicated: Progress per plan of care, trial of chin tuck with head lift, t and Y band pulls, kneeling planks       Therapy procedure time and total treatment time can be found documented on the Time Entry flowsheet

## 2025-02-28 LAB
BUN SERPL-MCNC: 21 MG/DL
CREAT SERPL-MCNC: 1.83 MG/DL
EXTERNAL CREATININE URINE HM: 154
EXTERNAL MICROALBUMIN URINE RANDOM HM: 69.2
EXTERNAL MICROALBUMIN/CREATININE URINE: 449
GFR SERPL CREATININE-BSD FRML MDRD: 29 ML/MIN/1.73M*2
GLUCOSE SERPL-MCNC: 164 MG/DL

## 2025-03-02 DIAGNOSIS — E78.2 MIXED HYPERLIPIDEMIA: ICD-10-CM

## 2025-03-03 RX ORDER — ATORVASTATIN CALCIUM 40 MG/1
40 TABLET, FILM COATED ORAL DAILY
Qty: 30 TABLET | Refills: 3 | Status: SHIPPED | OUTPATIENT
Start: 2025-03-03

## 2025-03-17 ENCOUNTER — APPOINTMENT (OUTPATIENT)
Dept: RADIOLOGY | Age: 75
End: 2025-03-17
Payer: MEDICARE

## 2025-03-17 ENCOUNTER — HOSPITAL ENCOUNTER (OUTPATIENT)
Facility: CLINIC | Age: 75
Discharge: HOME | End: 2025-03-17
Attending: FAMILY MEDICINE
Payer: MEDICARE

## 2025-03-17 VITALS
HEART RATE: 69 BPM | TEMPERATURE: 98 F | BODY MASS INDEX: 25.69 KG/M2 | OXYGEN SATURATION: 94 % | SYSTOLIC BLOOD PRESSURE: 157 MMHG | WEIGHT: 152 LBS | DIASTOLIC BLOOD PRESSURE: 82 MMHG

## 2025-03-17 DIAGNOSIS — M25.511 RIGHT SHOULDER PAIN, UNSPECIFIED CHRONICITY: Primary | ICD-10-CM

## 2025-03-17 PROCEDURE — 73030 X-RAY EXAM OF SHOULDER: CPT | Mod: RT

## 2025-03-17 PROCEDURE — 99213 OFFICE O/P EST LOW 20 MIN: CPT | Performed by: NURSE PRACTITIONER

## 2025-03-17 ASSESSMENT — ENCOUNTER SYMPTOMS
ARTHRALGIAS: 1
COLOR CHANGE: 0
WOUND: 0
WEAKNESS: 1
BACK PAIN: 0
JOINT SWELLING: 0
NUMBNESS: 0

## 2025-03-17 NOTE — DISCHARGE INSTRUCTIONS
Your xray was negative.     Supportive care:  Apply a warm compress to the area for 20 minutes at a time - two to three times a day.   You can take ibuprofen as needed for swelling and inflammation. You may also take acetaminophen as needed for pain.    Keep the affected area elevated as much as possible to decrease swelling and prevent further swelling.     If you have been given a supportive device or brace:  Be sure to wear the support device as much as possible for activity to prevent further injury to the area.   You may take the brace off if you are resting. Keep the affected area elevated as much as possible to decrease swelling and prevent further swelling.   Check your skin daily for signs of blisters or breakdown.     Acetaminophen - analgesic (pain reliever) - brand name: Tylenol  You may take acetaminophen as needed for pain.   You can take 650mg every 6 hours OR 1000mg every 8 hours as needed.   Do not exceed the daily limit (4000mg) as directed on the bottle, as this may be harmful to your liver.   Do not drink alcohol while taking this medication.     Follow-up:  Follow up with your primary care provider for additional evaluation and management.   Follow up with an orthopedic specialist as soon as possible for further evaluation and treatment.        COMMENT: Four views of the right shoulder were obtained.     No definite fracture is identified.  No subluxation or dislocation is  identified. There are no radiopaque foreign bodies identified.  There are no  abnormal calcifications in the region of the rotator cuff. Mild  acromioclavicular and glenohumeral degenerative arthropathy is noted.     MRI can be obtained for further evaluation if clinically indicated.     --  IMPRESSION: See comment.  -----------------------------------------

## 2025-03-20 ENCOUNTER — TELEPHONE (OUTPATIENT)
Dept: ORTHOPEDICS | Facility: CLINIC | Age: 75
End: 2025-03-20
Payer: MEDICARE

## 2025-04-15 ENCOUNTER — TELEPHONE (OUTPATIENT)
Dept: FAMILY MEDICINE | Facility: CLINIC | Age: 75
End: 2025-04-15
Payer: MEDICARE

## 2025-04-15 NOTE — TELEPHONE ENCOUNTER
Nateglinide was approved and PA will  on 25  Scanned to chart   Left message for pt to make aware  Pharmacy notified

## 2025-07-05 ENCOUNTER — TELEPHONE (OUTPATIENT)
Dept: CARDIOLOGY | Facility: CLINIC | Age: 75
End: 2025-07-05

## 2025-07-05 DIAGNOSIS — E78.2 MIXED HYPERLIPIDEMIA: ICD-10-CM

## 2025-07-07 RX ORDER — ATORVASTATIN CALCIUM 40 MG/1
40 TABLET, FILM COATED ORAL NIGHTLY
Qty: 90 TABLET | Refills: 0 | Status: SHIPPED | OUTPATIENT
Start: 2025-07-07 | End: 2025-10-05

## 2025-07-22 ENCOUNTER — OFFICE VISIT (OUTPATIENT)
Dept: FAMILY MEDICINE | Facility: CLINIC | Age: 75
End: 2025-07-22
Payer: MEDICARE

## 2025-07-22 VITALS
DIASTOLIC BLOOD PRESSURE: 78 MMHG | HEIGHT: 65 IN | WEIGHT: 157 LBS | SYSTOLIC BLOOD PRESSURE: 140 MMHG | RESPIRATION RATE: 16 BRPM | HEART RATE: 72 BPM | TEMPERATURE: 98.4 F | BODY MASS INDEX: 26.16 KG/M2 | OXYGEN SATURATION: 97 %

## 2025-07-22 DIAGNOSIS — M25.511 CHRONIC RIGHT SHOULDER PAIN: ICD-10-CM

## 2025-07-22 DIAGNOSIS — K44.9 HIATAL HERNIA: ICD-10-CM

## 2025-07-22 DIAGNOSIS — F51.04 PSYCHOPHYSIOLOGICAL INSOMNIA: ICD-10-CM

## 2025-07-22 DIAGNOSIS — G89.29 CHRONIC RIGHT SHOULDER PAIN: ICD-10-CM

## 2025-07-22 DIAGNOSIS — N18.32 STAGE 3B CHRONIC KIDNEY DISEASE (CMS/HCC): ICD-10-CM

## 2025-07-22 DIAGNOSIS — E11.22 TYPE 2 DIABETES MELLITUS WITH STAGE 3B CHRONIC KIDNEY DISEASE, WITHOUT LONG-TERM CURRENT USE OF INSULIN (CMS/HCC): Primary | ICD-10-CM

## 2025-07-22 DIAGNOSIS — E78.2 MIXED HYPERLIPIDEMIA: ICD-10-CM

## 2025-07-22 DIAGNOSIS — E55.9 VITAMIN D DEFICIENCY: ICD-10-CM

## 2025-07-22 DIAGNOSIS — N18.32 TYPE 2 DIABETES MELLITUS WITH STAGE 3B CHRONIC KIDNEY DISEASE, WITHOUT LONG-TERM CURRENT USE OF INSULIN (CMS/HCC): Primary | ICD-10-CM

## 2025-07-22 DIAGNOSIS — I10 BENIGN ESSENTIAL HYPERTENSION: ICD-10-CM

## 2025-07-22 DIAGNOSIS — R92.8 ABNORMAL MAMMOGRAM: ICD-10-CM

## 2025-07-22 PROBLEM — N18.30 STAGE 3 CHRONIC KIDNEY DISEASE (CMS/HCC): Status: RESOLVED | Noted: 2021-10-26 | Resolved: 2025-07-22

## 2025-07-22 PROCEDURE — G8753 SYS BP > OR = 140: HCPCS | Performed by: FAMILY MEDICINE

## 2025-07-22 PROCEDURE — G8754 DIAS BP LESS 90: HCPCS | Performed by: FAMILY MEDICINE

## 2025-07-22 PROCEDURE — 99214 OFFICE O/P EST MOD 30 MIN: CPT | Performed by: FAMILY MEDICINE

## 2025-07-22 RX ORDER — OMEPRAZOLE 20 MG/1
20 CAPSULE, DELAYED RELEASE ORAL
COMMUNITY

## 2025-07-22 RX ORDER — HYDROCHLOROTHIAZIDE 12.5 MG/1
1 CAPSULE ORAL DAILY
COMMUNITY
Start: 2024-11-14

## 2025-07-22 ASSESSMENT — PATIENT HEALTH QUESTIONNAIRE - PHQ9: SUM OF ALL RESPONSES TO PHQ9 QUESTIONS 1 & 2: 0

## 2025-07-22 ASSESSMENT — ENCOUNTER SYMPTOMS
TROUBLE SWALLOWING: 1
PALPITATIONS: 0
ARTHRALGIAS: 1
SLEEP DISTURBANCE: 1
SHORTNESS OF BREATH: 0
APPETITE CHANGE: 0
POLYDIPSIA: 0
DYSPHORIC MOOD: 0
ABDOMINAL PAIN: 1
FATIGUE: 1
POLYPHAGIA: 0

## 2025-07-22 NOTE — ASSESSMENT & PLAN NOTE
Patient now using Prilosec for symptoms most likely caused by hiatal hernia and it is helping.

## 2025-07-22 NOTE — ASSESSMENT & PLAN NOTE
Chronic kidney disease being followed by nephrology.  Patient due for blood work  Orders:    Comprehensive metabolic panel; Future

## 2025-07-22 NOTE — PROGRESS NOTES
Subjective      Patient ID: Vianney Bone is a 74 y.o. female.    Here for follow-up of chronic conditions and to review some new problems.  Patient was not able to tolerate the Trulicity and stopped it a few months ago.  Due for diabetes follow-up.  Patient also followed for hypertension and has history of chronic kidney disease.  Patient sees nephrology every 6 months.  Patient has GFR in the 29-30 range.    Month or so ago patient was having trouble with throat and trouble swallowing. Had swallow test in past and was normal.   Sometimes felt like acid in stomach.  Her daughter recommended starting on Prilosec.  All the symptoms better since on prilosec.and it helped.  Patient is currently taking this.  She does have a history of a hiatal hernia.  Patient also has developed pain in right shoulder. Went to urgent care March 2025.  She said some arthritic changes and they gave her some exercises to try but she has not noticed any marked improvement.  Patient looking for guidance of further treatment.         Tobacco  Allergies  Meds  Problems  Med Hx  Surg Hx  Fam Hx       Review of Systems   Constitutional:  Positive for fatigue (some). Negative for appetite change.   HENT:  Positive for trouble swallowing (improved).    Eyes:  Visual disturbance: sees eye doctor.   Respiratory:  Negative for shortness of breath.    Cardiovascular:  Negative for chest pain, palpitations and leg swelling.   Gastrointestinal:  Positive for abdominal pain (improved).   Endocrine: Negative for polydipsia, polyphagia and polyuria.   Musculoskeletal:  Positive for arthralgias.   Psychiatric/Behavioral:  Positive for sleep disturbance. Negative for dysphoric mood.      No Known Allergies  Current Outpatient Medications   Medication Sig Dispense Refill    ALPRAZolam (XANAX) 0.25 mg tablet Take 1 tablet (0.25 mg total) by mouth 2 (two) times a day as needed for anxiety. 60 tablet 3    amLODIPine (NORVASC) 5 mg tablet TAKE 1 TABLET (5  "MG TOTAL) BY MOUTH DAILY. 90 tablet 3    atorvastatin (LIPITOR) 40 mg tablet Take 1 tablet (40 mg total) by mouth nightly. 90 tablet 0    cholecalciferol, vitamin D3, 1,000 unit (25 mcg) tablet take 2 tablets daily      fosinopriL (MONOPRIL) 40 mg tablet TAKE 1 TABLET BY MOUTH EVERY DAY 90 tablet 3    hydrochlorothiazide (MICROZIDE) 12.5 mg capsule Take 1 tablet by mouth daily.      metoprolol succinate XL (TOPROL-XL) 25 mg 24 hr tablet Take 1 tablet (25 mg total) by mouth daily. 90 tablet 3    nateglinide (STARLIX) 60 mg tablet Take 1 tablet (60 mg total) by mouth 3 (three) times a day before meals. 270 tablet 3    omeprazole (PriLOSEC) 20 mg capsule Take 20 mg by mouth daily before breakfast.       No current facility-administered medications for this visit.     Past Medical History:   Diagnosis Date    Diabetes mellitus (CMS/HCC) 1/3/2011    Overview:     Hypertension     Mixed hyperlipidemia 1/3/2011    Overview:     Squamous cell cancer of skin of nose 6/27/2018     Past Surgical History   Procedure Laterality Date    Squamous cell carcinoma excision       Social History     Tobacco Use    Smoking status: Never    Smokeless tobacco: Never   Vaping Use    Vaping status: Never Used   Substance Use Topics    Alcohol use: No     Family History   Problem Relation Name Age of Onset    Hypertension Biological Mother      Heart disease Biological Father      Stroke Biological Father      Heart disease Biological Brother Hernandez     Ovarian cancer Biological Sister      Bipolar disorder Biological Sister      Rheum arthritis Biological Sister      Aneurysm Biological Son         Objective   Vitals:    07/22/25 1527   BP: (!) 140/78   Pulse: 72   Resp: 16   Temp: 36.9 °C (98.4 °F)   SpO2: 97%   Weight: 71.2 kg (157 lb)   Height: 1.638 m (5' 4.5\")    Body mass index is 26.53 kg/m².  Physical Exam  Constitutional:       General: She is not in acute distress.     Appearance: Normal appearance. She is well-developed and normal " weight.   Cardiovascular:      Rate and Rhythm: Normal rate and regular rhythm.      Heart sounds: Normal heart sounds, S1 normal and S2 normal. No murmur heard.  Pulmonary:      Effort: Pulmonary effort is normal. No respiratory distress.      Breath sounds: Normal breath sounds. No decreased breath sounds, wheezing, rhonchi or rales.   Abdominal:      General: Bowel sounds are normal. There is no distension.      Palpations: Abdomen is soft. There is no mass.      Tenderness: There is no abdominal tenderness.   Musculoskeletal:      Right shoulder: Tenderness present. Decreased range of motion: slight decrease in extension.      Right lower leg: No edema.      Left lower leg: No edema.   Neurological:      Mental Status: She is alert.             Assessment & Plan  Type 2 diabetes mellitus with stage 3b chronic kidney disease, without long-term current use of insulin (CMS/[facility])  Patient with hemoglobin A1c which improved with medication but she was not able to tolerate it.  Due for follow-up blood work and urine test.  Patient states she is seeing eye doctor regularly will get copy of report for chart  Orders:    Hemoglobin A1c; Future    Microalbumin/Creatinine Ur Random; Future    Comprehensive metabolic panel; Future    Stage 3b chronic kidney disease (CMS/[facility])  Chronic kidney disease being followed by nephrology.  Patient due for blood work  Orders:    Comprehensive metabolic panel; Future    Benign essential hypertension  Blood pressure stable.       Psychophysiological insomnia  Patient with insomnia.  Taking Xanax at night to help with this.  Reviewed safe usage of benzodiazepines       Chronic right shoulder pain  Persistent right shoulder pain.  Discussed with patient trial of physical therapy.  Patient also has option of seeing orthopedist for discussion of therapy versus injection.  Patient would like to see orthopedist for evaluation as she has daily pain.  Referral given  Orders:    Ambulatory referral  to [facility] Orthopedic Surgery; Future    Vitamin D deficiency  Patient due for follow-up vitamin D ordered  Orders:    Vitamin D 25 hydroxy; Future    Mixed hyperlipidemia  Patient due for follow-up blood work ordered we will follow-up with cardiology  Orders:    Lipid panel; Future    Hiatal hernia  Patient now using Prilosec for symptoms most likely caused by hiatal hernia and it is helping.       Abnormal mammogram  Patient due for follow-up mammogram prescription given  Orders:    BI DIAGNOSTIC MAMMOGRAM BILATERAL (TOMOSYNTHESIS); Future    ULTRASOUND BREAST LIMITED RIGHT; Future         Patient agrees and verbalizes understanding of medication and treatment plan discussed at today's visit.  Patient was instructed to call or follow-up if symptoms persist or worsen.         Monique Mcdonald, DO    This note was created with voice recognition software. Inadvertent dictation errors should be disregarded. Please contact my office for clarification.

## 2025-07-22 NOTE — ASSESSMENT & PLAN NOTE
Persistent right shoulder pain.  Discussed with patient trial of physical therapy.  Patient also has option of seeing orthopedist for discussion of therapy versus injection.  Patient would like to see orthopedist for evaluation as she has daily pain.  Referral given  Orders:    Ambulatory referral to [facility] Orthopedic Surgery; Future

## 2025-07-22 NOTE — ASSESSMENT & PLAN NOTE
Patient due for follow-up blood work ordered we will follow-up with cardiology  Orders:    Lipid panel; Future

## 2025-07-22 NOTE — ASSESSMENT & PLAN NOTE
Patient with insomnia.  Taking Xanax at night to help with this.  Reviewed safe usage of benzodiazepines

## 2025-07-22 NOTE — ASSESSMENT & PLAN NOTE
Patient with hemoglobin A1c which improved with medication but she was not able to tolerate it.  Due for follow-up blood work and urine test.  Patient states she is seeing eye doctor regularly will get copy of report for chart  Orders:    Hemoglobin A1c; Future    Microalbumin/Creatinine Ur Random; Future    Comprehensive metabolic panel; Future

## 2025-07-23 ENCOUNTER — TELEPHONE (OUTPATIENT)
Dept: FAMILY MEDICINE | Facility: CLINIC | Age: 75
End: 2025-07-23
Payer: MEDICARE

## 2025-07-25 RX ORDER — FOSINOPRIL SODIUM 40 MG/1
40 TABLET ORAL DAILY
Qty: 90 TABLET | Refills: 0 | Status: SHIPPED | OUTPATIENT
Start: 2025-07-25

## 2025-07-25 NOTE — TELEPHONE ENCOUNTER
Medicine Refill Request    Last Office Visit: 7/22/2025   Last Consult Visit: Visit date not found  Last Telemedicine Visit: Visit date not found    Next Appointment: Visit date not found      Current Outpatient Medications:     ALPRAZolam (XANAX) 0.25 mg tablet, Take 1 tablet (0.25 mg total) by mouth 2 (two) times a day as needed for anxiety., Disp: 60 tablet, Rfl: 3    amLODIPine (NORVASC) 5 mg tablet, TAKE 1 TABLET (5 MG TOTAL) BY MOUTH DAILY., Disp: 90 tablet, Rfl: 3    atorvastatin (LIPITOR) 40 mg tablet, Take 1 tablet (40 mg total) by mouth nightly., Disp: 90 tablet, Rfl: 0    cholecalciferol, vitamin D3, 1,000 unit (25 mcg) tablet, take 2 tablets daily, Disp: , Rfl:     fosinopriL (MONOPRIL) 40 mg tablet, TAKE 1 TABLET BY MOUTH EVERY DAY, Disp: 90 tablet, Rfl: 3    hydrochlorothiazide (MICROZIDE) 12.5 mg capsule, Take 1 tablet by mouth daily., Disp: , Rfl:     metoprolol succinate XL (TOPROL-XL) 25 mg 24 hr tablet, Take 1 tablet (25 mg total) by mouth daily., Disp: 90 tablet, Rfl: 3    nateglinide (STARLIX) 60 mg tablet, Take 1 tablet (60 mg total) by mouth 3 (three) times a day before meals., Disp: 270 tablet, Rfl: 3    omeprazole (PriLOSEC) 20 mg capsule, Take 20 mg by mouth daily before breakfast., Disp: , Rfl:     BP Readings from Last 3 Encounters:   07/22/25 (!) 140/78   03/17/25 (!) 157/82   10/14/24 126/76       Recent Lab results:  Lab Results   Component Value Date    CHOL 145 05/28/2024   ,   Lab Results   Component Value Date    HDL 40 05/28/2024   ,   Lab Results   Component Value Date    LDLCALC 80 05/28/2024   ,   Lab Results   Component Value Date    TRIG 142 05/28/2024        Lab Results   Component Value Date    GLUCOSE 128 (H) 10/20/2023   ,   Lab Results   Component Value Date    HGBA1C 7.3 (H) 01/03/2025         Lab Results   Component Value Date    CREATININE 1.86 (H) 10/20/2023       Lab Results   Component Value Date    TSH 3.360 07/16/2016           Lab Results   Component Value Date     HGBA1C 7.3 (H) 01/03/2025

## 2025-07-29 ENCOUNTER — TELEPHONE (OUTPATIENT)
Dept: FAMILY MEDICINE | Facility: CLINIC | Age: 75
End: 2025-07-29
Payer: MEDICARE

## 2025-07-29 NOTE — LETTER
[facility]   Preventive Exam Fax Back Request  Date: 7/29/2025     RE: Care Management    We are reaching out to you because our mutual patient, Vianney Bone 1950  reported they had the preventive procedure (s) indicated below performed at your facility:       Diabetic Retinal Exam ( Diabetic Retinopathy)      Please Fax the most recent results to our office with the Cover Sheet.   (If not results are found, Please [place] the appropriate box request below)     No Results found   Results attached       Please Fax to Fax# [phone] Attn: Dr. Monique Mcdonald    THANK YOU FOR [facility]OR PARTNERSHIP IN PROVIDING EXCELLENT CARE TO OUR PATIENTS!    This request is confidential and intended only for the use of the individual or entity to which it is addressed. It may contain information that is privileged, confidential, and exempt from disclosure. If the reader of this message is not the intended recipient, you are hereby notified that any dissemination, distribution, or copying of this communication is strictly prohibited. If you believe you have received this communication in error, please notify us immediately at [phone].This request is confidential and intended only for the use of the individual or entity to which it is addressed. It may contain information that is privileged, confidential, and exempt from disclosure. If the reader of this message is not the intended recipient, you are hereby notified that any dissemination, distribution, or copying of this communication is strictly prohibited. If you believe you have received this communication in error, please notify us immediately at [phone].

## 2025-07-29 NOTE — TELEPHONE ENCOUNTER
----- Message from Monique Mcdonald sent at 7/22/2025  3:55 PM EDT -----  Need most recent diabetic eye exam -ophtho in care teams

## 2025-07-31 RX ORDER — NATEGLINIDE 60 MG/1
60 TABLET ORAL
Qty: 270 TABLET | Refills: 0 | Status: SHIPPED | OUTPATIENT
Start: 2025-07-31 | End: 2025-10-29

## 2025-07-31 NOTE — TELEPHONE ENCOUNTER
Medicine Refill Request    Last Office Visit: 7/22/2025   Last Consult Visit: Visit date not found  Last Telemedicine Visit: Visit date not found    Next Appointment: Visit date not found      Current Outpatient Medications:     ALPRAZolam (XANAX) 0.25 mg tablet, Take 1 tablet (0.25 mg total) by mouth 2 (two) times a day as needed for anxiety., Disp: 60 tablet, Rfl: 3    amLODIPine (NORVASC) 5 mg tablet, TAKE 1 TABLET (5 MG TOTAL) BY MOUTH DAILY., Disp: 90 tablet, Rfl: 3    atorvastatin (LIPITOR) 40 mg tablet, Take 1 tablet (40 mg total) by mouth nightly., Disp: 90 tablet, Rfl: 0    cholecalciferol, vitamin D3, 1,000 unit (25 mcg) tablet, take 2 tablets daily, Disp: , Rfl:     fosinopriL (MONOPRIL) 40 mg tablet, TAKE 1 TABLET BY MOUTH EVERY DAY, Disp: 90 tablet, Rfl: 0    hydrochlorothiazide (MICROZIDE) 12.5 mg capsule, Take 1 tablet by mouth daily., Disp: , Rfl:     metoprolol succinate XL (TOPROL-XL) 25 mg 24 hr tablet, Take 1 tablet (25 mg total) by mouth daily., Disp: 90 tablet, Rfl: 3    nateglinide (STARLIX) 60 mg tablet, Take 1 tablet (60 mg total) by mouth 3 (three) times a day before meals., Disp: 270 tablet, Rfl: 3    omeprazole (PriLOSEC) 20 mg capsule, Take 20 mg by mouth daily before breakfast., Disp: , Rfl:     BP Readings from Last 3 Encounters:   07/22/25 (!) 140/78   03/17/25 (!) 157/82   10/14/24 126/76       Recent Lab results:  Lab Results   Component Value Date    CHOL 145 05/28/2024   ,   Lab Results   Component Value Date    HDL 40 05/28/2024   ,   Lab Results   Component Value Date    LDLCALC 80 05/28/2024   ,   Lab Results   Component Value Date    TRIG 142 05/28/2024        Lab Results   Component Value Date    GLUCOSE 164 02/28/2025   ,   Lab Results   Component Value Date    HGBA1C 7.3 (H) 01/03/2025         Lab Results   Component Value Date    CREATININE 1.83 02/28/2025       Lab Results   Component Value Date    TSH 3.360 07/16/2016           Lab Results   Component Value Date     HGBA1C 7.3 (H) 01/03/2025

## 2025-08-20 LAB
25(OH)D3+25(OH)D2 SERPL-MCNC: 15.1 NG/ML (ref 30–100)
ALBUMIN SERPL-MCNC: 4.5 G/DL (ref 3.8–4.8)
ALBUMIN/CREAT UR: 116 MG/G CREAT (ref 0–29)
ALP SERPL-CCNC: 114 IU/L (ref 44–121)
ALT SERPL-CCNC: 12 IU/L (ref 0–32)
AST SERPL-CCNC: 16 IU/L (ref 0–40)
BILIRUB SERPL-MCNC: 0.6 MG/DL (ref 0–1.2)
BUN SERPL-MCNC: 16 MG/DL (ref 8–27)
BUN/CREAT SERPL: 10 (ref 12–28)
CALCIUM SERPL-MCNC: 9.7 MG/DL (ref 8.7–10.3)
CHLORIDE SERPL-SCNC: 100 MMOL/L (ref 96–106)
CHOLEST SERPL-MCNC: 189 MG/DL (ref 100–199)
CO2 SERPL-SCNC: 22 MMOL/L (ref 20–29)
CREAT SERPL-MCNC: 1.61 MG/DL (ref 0.57–1)
CREAT UR-MCNC: 33.6 MG/DL
EGFRCR SERPLBLD CKD-EPI 2021: 34 ML/MIN/1.73
GLOBULIN SER CALC-MCNC: 2.4 G/DL (ref 1.5–4.5)
GLUCOSE SERPL-MCNC: 154 MG/DL (ref 70–99)
HBA1C MFR BLD: 7.9 % (ref 4.8–5.6)
HDLC SERPL-MCNC: 45 MG/DL
LDLC SERPL CALC-MCNC: 106 MG/DL (ref 0–99)
MICROALBUMIN UR-MCNC: 39 UG/ML
POTASSIUM SERPL-SCNC: 4.9 MMOL/L (ref 3.5–5.2)
PROT SERPL-MCNC: 6.9 G/DL (ref 6–8.5)
SODIUM SERPL-SCNC: 138 MMOL/L (ref 134–144)
TRIGL SERPL-MCNC: 219 MG/DL (ref 0–149)
VLDLC SERPL CALC-MCNC: 38 MG/DL (ref 5–40)

## 2025-09-03 LAB — COLOGUARD: POSITIVE

## 2025-09-04 ENCOUNTER — HOSPITAL ENCOUNTER (OUTPATIENT)
Dept: RADIOLOGY | Facility: HOSPITAL | Age: 75
Discharge: HOME | End: 2025-09-04
Attending: FAMILY MEDICINE
Payer: MEDICARE

## 2025-09-04 DIAGNOSIS — R92.8 ABNORMAL MAMMOGRAM: ICD-10-CM

## 2025-09-04 PROCEDURE — 76642 ULTRASOUND BREAST LIMITED: CPT | Mod: 50

## 2025-09-04 PROCEDURE — 77066 DX MAMMO INCL CAD BI: CPT
